# Patient Record
Sex: MALE | Race: WHITE | NOT HISPANIC OR LATINO | Employment: OTHER | RURAL
[De-identification: names, ages, dates, MRNs, and addresses within clinical notes are randomized per-mention and may not be internally consistent; named-entity substitution may affect disease eponyms.]

---

## 2020-10-22 ENCOUNTER — HISTORICAL (OUTPATIENT)
Dept: ADMINISTRATIVE | Facility: HOSPITAL | Age: 60
End: 2020-10-22

## 2021-06-28 ENCOUNTER — OFFICE VISIT (OUTPATIENT)
Dept: FAMILY MEDICINE | Facility: CLINIC | Age: 61
End: 2021-06-28
Payer: MEDICAID

## 2021-06-28 VITALS
BODY MASS INDEX: 14.37 KG/M2 | RESPIRATION RATE: 18 BRPM | WEIGHT: 97 LBS | OXYGEN SATURATION: 95 % | DIASTOLIC BLOOD PRESSURE: 90 MMHG | HEART RATE: 72 BPM | SYSTOLIC BLOOD PRESSURE: 142 MMHG | HEIGHT: 69 IN

## 2021-06-28 DIAGNOSIS — F32.A DEPRESSION, UNSPECIFIED DEPRESSION TYPE: Primary | ICD-10-CM

## 2021-06-28 DIAGNOSIS — J44.9 CHRONIC OBSTRUCTIVE PULMONARY DISEASE, UNSPECIFIED COPD TYPE: ICD-10-CM

## 2021-06-28 PROCEDURE — 99212 PR OFFICE/OUTPT VISIT, EST, LEVL II, 10-19 MIN: ICD-10-PCS | Mod: ,,, | Performed by: FAMILY MEDICINE

## 2021-06-28 PROCEDURE — 99212 OFFICE O/P EST SF 10 MIN: CPT | Mod: ,,, | Performed by: FAMILY MEDICINE

## 2021-06-28 RX ORDER — ALBUTEROL SULFATE 90 UG/1
2 AEROSOL, METERED RESPIRATORY (INHALATION) EVERY 6 HOURS PRN
COMMUNITY
End: 2021-12-14 | Stop reason: SDUPTHER

## 2021-12-14 RX ORDER — ALBUTEROL SULFATE 90 UG/1
2 AEROSOL, METERED RESPIRATORY (INHALATION) EVERY 6 HOURS PRN
Qty: 18 G | Refills: 0 | Status: SHIPPED | OUTPATIENT
Start: 2021-12-14 | End: 2022-01-03

## 2021-12-28 ENCOUNTER — OFFICE VISIT (OUTPATIENT)
Dept: FAMILY MEDICINE | Facility: CLINIC | Age: 61
End: 2021-12-28
Payer: MEDICAID

## 2021-12-28 VITALS
OXYGEN SATURATION: 98 % | HEART RATE: 66 BPM | HEIGHT: 69 IN | TEMPERATURE: 98 F | RESPIRATION RATE: 18 BRPM | DIASTOLIC BLOOD PRESSURE: 69 MMHG | BODY MASS INDEX: 14.37 KG/M2 | SYSTOLIC BLOOD PRESSURE: 142 MMHG | WEIGHT: 97 LBS

## 2021-12-28 DIAGNOSIS — F32.A DEPRESSION, UNSPECIFIED DEPRESSION TYPE: Primary | ICD-10-CM

## 2021-12-28 DIAGNOSIS — J44.9 CHRONIC OBSTRUCTIVE PULMONARY DISEASE, UNSPECIFIED COPD TYPE: ICD-10-CM

## 2021-12-28 DIAGNOSIS — H54.3 DECREASED VISION IN BOTH EYES: ICD-10-CM

## 2021-12-28 PROCEDURE — 3008F PR BODY MASS INDEX (BMI) DOCUMENTED: ICD-10-PCS | Mod: CPTII,,, | Performed by: FAMILY MEDICINE

## 2021-12-28 PROCEDURE — 3077F PR MOST RECENT SYSTOLIC BLOOD PRESSURE >= 140 MM HG: ICD-10-PCS | Mod: CPTII,,, | Performed by: FAMILY MEDICINE

## 2021-12-28 PROCEDURE — 99213 OFFICE O/P EST LOW 20 MIN: CPT | Mod: ,,, | Performed by: FAMILY MEDICINE

## 2021-12-28 PROCEDURE — 3078F DIAST BP <80 MM HG: CPT | Mod: CPTII,,, | Performed by: FAMILY MEDICINE

## 2021-12-28 PROCEDURE — 3008F BODY MASS INDEX DOCD: CPT | Mod: CPTII,,, | Performed by: FAMILY MEDICINE

## 2021-12-28 PROCEDURE — 1159F PR MEDICATION LIST DOCUMENTED IN MEDICAL RECORD: ICD-10-PCS | Mod: CPTII,,, | Performed by: FAMILY MEDICINE

## 2021-12-28 PROCEDURE — 99213 PR OFFICE/OUTPT VISIT, EST, LEVL III, 20-29 MIN: ICD-10-PCS | Mod: ,,, | Performed by: FAMILY MEDICINE

## 2021-12-28 PROCEDURE — 3078F PR MOST RECENT DIASTOLIC BLOOD PRESSURE < 80 MM HG: ICD-10-PCS | Mod: CPTII,,, | Performed by: FAMILY MEDICINE

## 2021-12-28 PROCEDURE — 3077F SYST BP >= 140 MM HG: CPT | Mod: CPTII,,, | Performed by: FAMILY MEDICINE

## 2021-12-28 PROCEDURE — 1159F MED LIST DOCD IN RCRD: CPT | Mod: CPTII,,, | Performed by: FAMILY MEDICINE

## 2021-12-28 RX ORDER — DULOXETIN HYDROCHLORIDE 60 MG/1
60 CAPSULE, DELAYED RELEASE ORAL DAILY
Qty: 90 CAPSULE | Refills: 1 | Status: SHIPPED | OUTPATIENT
Start: 2021-12-28 | End: 2022-06-27 | Stop reason: SDUPTHER

## 2022-01-19 ENCOUNTER — IMMUNIZATION (OUTPATIENT)
Dept: FAMILY MEDICINE | Facility: CLINIC | Age: 62
End: 2022-01-19
Payer: MEDICAID

## 2022-01-19 DIAGNOSIS — Z23 NEED FOR VACCINATION: Primary | ICD-10-CM

## 2022-01-19 PROCEDURE — 0013A COVID-19, MRNA, LNP-S, PF, 100 MCG/0.5 ML DOSE VACCINE: ICD-10-PCS | Mod: ,,, | Performed by: NURSE PRACTITIONER

## 2022-01-19 PROCEDURE — 91301 COVID-19, MRNA, LNP-S, PF, 100 MCG/0.5 ML DOSE VACCINE: ICD-10-PCS | Mod: ,,, | Performed by: NURSE PRACTITIONER

## 2022-01-19 PROCEDURE — 91301 COVID-19, MRNA, LNP-S, PF, 100 MCG/0.5 ML DOSE VACCINE: CPT | Mod: ,,, | Performed by: NURSE PRACTITIONER

## 2022-01-19 PROCEDURE — 0013A COVID-19, MRNA, LNP-S, PF, 100 MCG/0.5 ML DOSE VACCINE: CPT | Mod: ,,, | Performed by: NURSE PRACTITIONER

## 2022-06-28 ENCOUNTER — APPOINTMENT (OUTPATIENT)
Dept: RADIOLOGY | Facility: CLINIC | Age: 62
End: 2022-06-28
Attending: FAMILY MEDICINE
Payer: MEDICAID

## 2022-06-28 ENCOUNTER — OFFICE VISIT (OUTPATIENT)
Dept: FAMILY MEDICINE | Facility: CLINIC | Age: 62
End: 2022-06-28
Payer: MEDICAID

## 2022-06-28 VITALS
DIASTOLIC BLOOD PRESSURE: 98 MMHG | HEART RATE: 92 BPM | BODY MASS INDEX: 12.74 KG/M2 | HEIGHT: 69 IN | SYSTOLIC BLOOD PRESSURE: 157 MMHG | WEIGHT: 86 LBS | OXYGEN SATURATION: 98 % | TEMPERATURE: 97 F | RESPIRATION RATE: 18 BRPM

## 2022-06-28 DIAGNOSIS — R94.31 ABNORMAL ECG: ICD-10-CM

## 2022-06-28 DIAGNOSIS — F17.200 SMOKER: ICD-10-CM

## 2022-06-28 DIAGNOSIS — R06.02 SOB (SHORTNESS OF BREATH): Primary | ICD-10-CM

## 2022-06-28 DIAGNOSIS — R63.4 WEIGHT LOSS: ICD-10-CM

## 2022-06-28 PROCEDURE — 84443 ASSAY THYROID STIM HORMONE: CPT | Mod: ,,, | Performed by: CLINICAL MEDICAL LABORATORY

## 2022-06-28 PROCEDURE — 85025 COMPLETE CBC W/AUTO DIFF WBC: CPT | Mod: ,,, | Performed by: CLINICAL MEDICAL LABORATORY

## 2022-06-28 PROCEDURE — 3077F PR MOST RECENT SYSTOLIC BLOOD PRESSURE >= 140 MM HG: ICD-10-PCS | Mod: CPTII,,, | Performed by: FAMILY MEDICINE

## 2022-06-28 PROCEDURE — 93005 PR ELECTROCARDIOGRAM, TRACING: ICD-10-PCS | Mod: ,,, | Performed by: FAMILY MEDICINE

## 2022-06-28 PROCEDURE — 99213 PR OFFICE/OUTPT VISIT, EST, LEVL III, 20-29 MIN: ICD-10-PCS | Mod: ,,, | Performed by: FAMILY MEDICINE

## 2022-06-28 PROCEDURE — 3008F BODY MASS INDEX DOCD: CPT | Mod: CPTII,,, | Performed by: FAMILY MEDICINE

## 2022-06-28 PROCEDURE — 71046 X-RAY EXAM CHEST 2 VIEWS: CPT | Mod: TC,RHCUB | Performed by: FAMILY MEDICINE

## 2022-06-28 PROCEDURE — 1159F PR MEDICATION LIST DOCUMENTED IN MEDICAL RECORD: ICD-10-PCS | Mod: CPTII,,, | Performed by: FAMILY MEDICINE

## 2022-06-28 PROCEDURE — 80053 COMPREHENSIVE METABOLIC PANEL: ICD-10-PCS | Mod: ,,, | Performed by: CLINICAL MEDICAL LABORATORY

## 2022-06-28 PROCEDURE — 71046 XR CHEST PA AND LATERAL: ICD-10-PCS | Mod: 26,,, | Performed by: RADIOLOGY

## 2022-06-28 PROCEDURE — 71046 X-RAY EXAM CHEST 2 VIEWS: CPT | Mod: 26,,, | Performed by: RADIOLOGY

## 2022-06-28 PROCEDURE — 1159F MED LIST DOCD IN RCRD: CPT | Mod: CPTII,,, | Performed by: FAMILY MEDICINE

## 2022-06-28 PROCEDURE — 3077F SYST BP >= 140 MM HG: CPT | Mod: CPTII,,, | Performed by: FAMILY MEDICINE

## 2022-06-28 PROCEDURE — 3008F PR BODY MASS INDEX (BMI) DOCUMENTED: ICD-10-PCS | Mod: CPTII,,, | Performed by: FAMILY MEDICINE

## 2022-06-28 PROCEDURE — 3080F DIAST BP >= 90 MM HG: CPT | Mod: CPTII,,, | Performed by: FAMILY MEDICINE

## 2022-06-28 PROCEDURE — 84443 TSH: ICD-10-PCS | Mod: ,,, | Performed by: CLINICAL MEDICAL LABORATORY

## 2022-06-28 PROCEDURE — 99213 OFFICE O/P EST LOW 20 MIN: CPT | Mod: ,,, | Performed by: FAMILY MEDICINE

## 2022-06-28 PROCEDURE — 80053 COMPREHEN METABOLIC PANEL: CPT | Mod: ,,, | Performed by: CLINICAL MEDICAL LABORATORY

## 2022-06-28 PROCEDURE — 93005 ELECTROCARDIOGRAM TRACING: CPT | Mod: ,,, | Performed by: FAMILY MEDICINE

## 2022-06-28 PROCEDURE — 3080F PR MOST RECENT DIASTOLIC BLOOD PRESSURE >= 90 MM HG: ICD-10-PCS | Mod: CPTII,,, | Performed by: FAMILY MEDICINE

## 2022-06-28 PROCEDURE — 1160F PR REVIEW ALL MEDS BY PRESCRIBER/CLIN PHARMACIST DOCUMENTED: ICD-10-PCS | Mod: CPTII,,, | Performed by: FAMILY MEDICINE

## 2022-06-28 PROCEDURE — 85025 CBC WITH DIFFERENTIAL: ICD-10-PCS | Mod: ,,, | Performed by: CLINICAL MEDICAL LABORATORY

## 2022-06-28 PROCEDURE — 1160F RVW MEDS BY RX/DR IN RCRD: CPT | Mod: CPTII,,, | Performed by: FAMILY MEDICINE

## 2022-06-28 NOTE — PROGRESS NOTES
Dereck Delgado is a 62 y.o. male seen today for symptoms of increased fatigue increased shortness of breath erectile dysfunction and abnormal weight loss.  Patient reports his appetite had been worsening but over the last 2 days has improved.  Patient continues to smoke approximately 1 pack per day and has a greater than 50 year pack history.  We discussed a low-dose CT scan of the chest and I reminded the patient of the risks and benefits of this procedure.  This includes radiation.  Patient has had increased symptoms of erectile dysfunction and before we prescribed medication I recommended a cardiac evaluation.  His EKG here in the office today was abnormal and Cardiology consult has been initiated.      No past medical history on file.  No family history on file.  Current Outpatient Medications on File Prior to Visit   Medication Sig Dispense Refill    DULoxetine (CYMBALTA) 60 MG capsule TAKE 1 CAPSULE BY MOUTH EVERY DAY 90 capsule 1    fluticasone propionate (FLONASE) 50 mcg/actuation nasal spray 2 SPRAYS IN EACH NOSTRIL DAILY 16 mL 5    glycopyrrolate-formoteroL (BEVESPI AEROSPHERE) 9-4.8 mcg HFAA Inhale 2 puffs into the lungs 2 (two) times daily. Controller      hydroCHLOROthiazide (HYDRODIURIL) 12.5 MG Tab TAKE 1 TABLET BY MOUTH EVERY DAY IN THE MORNING 30 tablet 5    VENTOLIN HFA 90 mcg/actuation inhaler INHALE 2 PUFFS INTO THE LUNGS EVERY 6 (SIX) HOURS AS NEEDED FOR WHEEZING. RESCUE 18 g 3     No current facility-administered medications on file prior to visit.     Immunization History   Administered Date(s) Administered    COVID-19, MRNA, LN-S, PF (MODERNA FULL 0.5 ML DOSE) 03/09/2021, 04/06/2021, 01/19/2022       Review of Systems   Constitutional: Positive for malaise/fatigue and weight loss. Negative for fever.   HENT: Positive for congestion.    Respiratory: Positive for cough and shortness of breath.    Cardiovascular: Negative for chest pain and palpitations.   Gastrointestinal: Negative for  nausea and vomiting.   Psychiatric/Behavioral: Negative for depression.        Vitals:    06/28/22 1405   BP: (!) 157/98   Pulse:    Resp:    Temp:        Physical Exam  Vitals reviewed.   Constitutional:       Appearance: Normal appearance.      Comments: Patient is thin in appearance with prominent ribs and clavicles noted.   HENT:      Head: Normocephalic.   Eyes:      Extraocular Movements: Extraocular movements intact.      Conjunctiva/sclera: Conjunctivae normal.      Pupils: Pupils are equal, round, and reactive to light.   Neck:      Thyroid: No thyroid mass or thyromegaly.   Cardiovascular:      Rate and Rhythm: Normal rate and regular rhythm.      Heart sounds: Normal heart sounds. No murmur heard.    No gallop.   Pulmonary:      Effort: Pulmonary effort is normal. No respiratory distress.      Breath sounds: Decreased air movement present. Decreased breath sounds present. No wheezing or rales.   Skin:     General: Skin is warm and dry.      Coloration: Skin is not jaundiced or pale.   Neurological:      Mental Status: He is alert.   Psychiatric:         Mood and Affect: Mood normal.         Behavior: Behavior normal.         Thought Content: Thought content normal.         Judgment: Judgment normal.          Assessment and Plan  SOB (shortness of breath)  -     X-Ray Chest PA And Lateral; Future; Expected date: 06/28/2022  -     POCT EKG 12-LEAD (NOT FOR [facility] USE)  -     Ambulatory referral/consult to Cardiology; Future; Expected date: 07/05/2022    Weight loss  -     CBC Auto Differential; Future; Expected date: 06/28/2022  -     Comprehensive Metabolic Panel; Future; Expected date: 06/28/2022  -     TSH; Future; Expected date: 06/28/2022  -     X-Ray Chest PA And Lateral; Future; Expected date: 06/28/2022    Smoker  -     CT Chest Lung Screening Low Dose; Future; Expected date: 06/28/2022    Abnormal ECG  -     Ambulatory referral/consult to Cardiology; Future; Expected date:  07/05/2022            Return to clinic in 1 month for follow-up or as needed.  Due to his symptoms of increased shortness of breath we have ordered he an assistant breathing device and oxygen at night.  He is strongly encouraged to discontinue smoking.  His chest x-ray is currently pending but suggest severe COPD.    Health Maintenance Topics with due status: Not Due       Topic Last Completion Date    Influenza Vaccine Not Due

## 2022-06-29 ENCOUNTER — TELEPHONE (OUTPATIENT)
Dept: FAMILY MEDICINE | Facility: CLINIC | Age: 62
End: 2022-06-29
Payer: MEDICAID

## 2022-06-29 LAB
ALBUMIN SERPL BCP-MCNC: 3.7 G/DL (ref 3.5–5)
ALBUMIN/GLOB SERPL: 0.9 {RATIO}
ALP SERPL-CCNC: 107 U/L (ref 45–115)
ALT SERPL W P-5'-P-CCNC: 25 U/L (ref 16–61)
ANION GAP SERPL CALCULATED.3IONS-SCNC: 17 MMOL/L (ref 7–16)
AST SERPL W P-5'-P-CCNC: 33 U/L (ref 15–37)
BASOPHILS # BLD AUTO: 0.07 K/UL (ref 0–0.2)
BASOPHILS NFR BLD AUTO: 0.7 % (ref 0–1)
BILIRUB SERPL-MCNC: 1.6 MG/DL (ref 0–1.2)
BUN SERPL-MCNC: 9 MG/DL (ref 7–18)
BUN/CREAT SERPL: 11 (ref 6–20)
CALCIUM SERPL-MCNC: 9.7 MG/DL (ref 8.5–10.1)
CHLORIDE SERPL-SCNC: 93 MMOL/L (ref 98–107)
CO2 SERPL-SCNC: 29 MMOL/L (ref 21–32)
CREAT SERPL-MCNC: 0.8 MG/DL (ref 0.7–1.3)
DIFFERENTIAL METHOD BLD: ABNORMAL
EOSINOPHIL # BLD AUTO: 0.11 K/UL (ref 0–0.5)
EOSINOPHIL NFR BLD AUTO: 1.1 % (ref 1–4)
ERYTHROCYTE [DISTWIDTH] IN BLOOD BY AUTOMATED COUNT: 18.9 % (ref 11.5–14.5)
GLOBULIN SER-MCNC: 4 G/DL (ref 2–4)
GLUCOSE SERPL-MCNC: 91 MG/DL (ref 74–106)
HCT VFR BLD AUTO: 59.5 % (ref 40–54)
HGB BLD-MCNC: 21 G/DL (ref 13.5–18)
IMM GRANULOCYTES # BLD AUTO: 0.02 K/UL (ref 0–0.04)
IMM GRANULOCYTES NFR BLD: 0.2 % (ref 0–0.4)
LYMPHOCYTES # BLD AUTO: 1.68 K/UL (ref 1–4.8)
LYMPHOCYTES NFR BLD AUTO: 17.1 % (ref 27–41)
MCH RBC QN AUTO: 34.1 PG (ref 27–31)
MCHC RBC AUTO-ENTMCNC: 35.3 G/DL (ref 32–36)
MCV RBC AUTO: 96.6 FL (ref 80–96)
MONOCYTES # BLD AUTO: 0.96 K/UL (ref 0–0.8)
MONOCYTES NFR BLD AUTO: 9.8 % (ref 2–6)
MPC BLD CALC-MCNC: 12 FL (ref 9.4–12.4)
NEUTROPHILS # BLD AUTO: 6.99 K/UL (ref 1.8–7.7)
NEUTROPHILS NFR BLD AUTO: 71.1 % (ref 53–65)
NRBC # BLD AUTO: 0 X10E3/UL
NRBC, AUTO (.00): 0 %
PLATELET # BLD AUTO: 220 K/UL (ref 150–400)
POTASSIUM SERPL-SCNC: 3.9 MMOL/L (ref 3.5–5.1)
PROT SERPL-MCNC: 7.7 G/DL (ref 6.4–8.2)
RBC # BLD AUTO: 6.16 M/UL (ref 4.6–6.2)
SODIUM SERPL-SCNC: 135 MMOL/L (ref 136–145)
TSH SERPL DL<=0.005 MIU/L-ACNC: 1.27 UIU/ML (ref 0.36–3.74)
WBC # BLD AUTO: 9.83 K/UL (ref 4.5–11)

## 2022-06-29 NOTE — TELEPHONE ENCOUNTER
----- Message from Jose Manuel Castro MD sent at 6/29/2022  7:55 AM CDT -----  Patient's lungs are hyperexpanded consistent with COPD but no other findings.

## 2022-07-07 ENCOUNTER — TELEPHONE (OUTPATIENT)
Dept: FAMILY MEDICINE | Facility: CLINIC | Age: 62
End: 2022-07-07
Payer: MEDICAID

## 2022-07-07 NOTE — TELEPHONE ENCOUNTER
----- Message from Jose Manuel Castro MD sent at 6/30/2022  7:55 AM CDT -----  Please add a direct bilirubin, and with his profound weight loss I recommend a CT of the abdomen and pelvis with and without contrast.  Patient's CBC is markedly abnormal with severely thickened blood.  This is likely secondary to a compensation mechanism for his COPD.  I recommend a frequent use of his assistant ventilatory device and the patient does need evaluation for portable O2.  Due to his risk for occlusion leading to CVA are cardiac events I recommended  and a sap Hematology evaluation.

## 2022-07-11 ENCOUNTER — OFFICE VISIT (OUTPATIENT)
Dept: FAMILY MEDICINE | Facility: CLINIC | Age: 62
End: 2022-07-11
Payer: MEDICAID

## 2022-07-11 VITALS
RESPIRATION RATE: 18 BRPM | WEIGHT: 87.81 LBS | SYSTOLIC BLOOD PRESSURE: 140 MMHG | HEART RATE: 94 BPM | OXYGEN SATURATION: 95 % | BODY MASS INDEX: 13.01 KG/M2 | HEIGHT: 69 IN | DIASTOLIC BLOOD PRESSURE: 90 MMHG

## 2022-07-11 DIAGNOSIS — R79.89 ABNORMAL CBC: ICD-10-CM

## 2022-07-11 DIAGNOSIS — Z86.73 HISTORY OF CVA (CEREBROVASCULAR ACCIDENT): ICD-10-CM

## 2022-07-11 DIAGNOSIS — R17 ELEVATED BILIRUBIN: Primary | ICD-10-CM

## 2022-07-11 DIAGNOSIS — R63.4 WEIGHT LOSS: ICD-10-CM

## 2022-07-11 LAB — BILIRUB DIRECT SERPL-MCNC: 0.3 MG/DL (ref 0–0.2)

## 2022-07-11 PROCEDURE — 1159F MED LIST DOCD IN RCRD: CPT | Mod: CPTII,,, | Performed by: FAMILY MEDICINE

## 2022-07-11 PROCEDURE — 1160F RVW MEDS BY RX/DR IN RCRD: CPT | Mod: CPTII,,, | Performed by: FAMILY MEDICINE

## 2022-07-11 PROCEDURE — 82248 BILIRUBIN, DIRECT: ICD-10-PCS | Mod: ,,, | Performed by: CLINICAL MEDICAL LABORATORY

## 2022-07-11 PROCEDURE — 3077F SYST BP >= 140 MM HG: CPT | Mod: CPTII,,, | Performed by: FAMILY MEDICINE

## 2022-07-11 PROCEDURE — 1160F PR REVIEW ALL MEDS BY PRESCRIBER/CLIN PHARMACIST DOCUMENTED: ICD-10-PCS | Mod: CPTII,,, | Performed by: FAMILY MEDICINE

## 2022-07-11 PROCEDURE — 99213 PR OFFICE/OUTPT VISIT, EST, LEVL III, 20-29 MIN: ICD-10-PCS | Mod: ,,, | Performed by: FAMILY MEDICINE

## 2022-07-11 PROCEDURE — 99213 OFFICE O/P EST LOW 20 MIN: CPT | Mod: ,,, | Performed by: FAMILY MEDICINE

## 2022-07-11 PROCEDURE — 3008F PR BODY MASS INDEX (BMI) DOCUMENTED: ICD-10-PCS | Mod: CPTII,,, | Performed by: FAMILY MEDICINE

## 2022-07-11 PROCEDURE — 82248 BILIRUBIN DIRECT: CPT | Mod: ,,, | Performed by: CLINICAL MEDICAL LABORATORY

## 2022-07-11 PROCEDURE — 3080F DIAST BP >= 90 MM HG: CPT | Mod: CPTII,,, | Performed by: FAMILY MEDICINE

## 2022-07-11 PROCEDURE — 3077F PR MOST RECENT SYSTOLIC BLOOD PRESSURE >= 140 MM HG: ICD-10-PCS | Mod: CPTII,,, | Performed by: FAMILY MEDICINE

## 2022-07-11 PROCEDURE — 3008F BODY MASS INDEX DOCD: CPT | Mod: CPTII,,, | Performed by: FAMILY MEDICINE

## 2022-07-11 PROCEDURE — 3080F PR MOST RECENT DIASTOLIC BLOOD PRESSURE >= 90 MM HG: ICD-10-PCS | Mod: CPTII,,, | Performed by: FAMILY MEDICINE

## 2022-07-11 PROCEDURE — 1159F PR MEDICATION LIST DOCUMENTED IN MEDICAL RECORD: ICD-10-PCS | Mod: CPTII,,, | Performed by: FAMILY MEDICINE

## 2022-07-11 NOTE — PROGRESS NOTES
Dereck Delgado is a 62 y.o. male seen today for follow-up for recent lab work.  Patient does have severe polycythemia likely secondary to his COPD and chronic hypoxia.  Patient also has an elevated bilirubin which may be secondary to his polycythemia and myolysis.  With this profound weight loss I have recommended a CT of the abdomen to make sure there is not liver pathology present.  A direct bilirubin was also ordered today.  Patient reports he is planning to cut back on his smoking and is already down to 18 cigarettes a day.  His cardiology evaluation for an abnormal EKG is currently pending as well as his low-dose CT scan of the chest.  Patient defers COVID vaccination.    Past Medical History:   Diagnosis Date    COPD (chronic obstructive pulmonary disease)     CVA (cerebral vascular accident)     Depression      Family History   Problem Relation Age of Onset    Cancer Mother     Cancer Maternal Aunt     Heart disease Maternal Uncle     Birth defects Maternal Grandmother      Current Outpatient Medications on File Prior to Visit   Medication Sig Dispense Refill    DULoxetine (CYMBALTA) 60 MG capsule TAKE 1 CAPSULE BY MOUTH EVERY DAY 90 capsule 1    fluticasone propionate (FLONASE) 50 mcg/actuation nasal spray 2 SPRAYS IN EACH NOSTRIL DAILY 16 mL 5    hydroCHLOROthiazide (HYDRODIURIL) 12.5 MG Tab TAKE 1 TABLET BY MOUTH EVERY DAY IN THE MORNING 30 tablet 5    VENTOLIN HFA 90 mcg/actuation inhaler INHALE 2 PUFFS INTO THE LUNGS EVERY 6 (SIX) HOURS AS NEEDED FOR WHEEZING. RESCUE 18 g 3    [DISCONTINUED] glycopyrrolate-formoteroL (BEVESPI AEROSPHERE) 9-4.8 mcg HFAA Inhale 2 puffs into the lungs 2 (two) times daily. Controller       No current facility-administered medications on file prior to visit.     Immunization History   Administered Date(s) Administered    COVID-19, MRNA, LN-S, PF (MODERNA FULL 0.5 ML DOSE) 03/09/2021, 04/06/2021, 01/19/2022       Review of Systems   Constitutional: Positive for  malaise/fatigue and weight loss. Negative for fever.   Respiratory: Positive for cough and shortness of breath.    Cardiovascular: Negative for chest pain and palpitations.   Gastrointestinal: Negative for nausea and vomiting.   Psychiatric/Behavioral: Negative for depression.        Vitals:    07/11/22 1448   BP: (!) 140/90   Pulse:    Resp:        Physical Exam  Vitals reviewed.   Constitutional:       Appearance: Normal appearance.   HENT:      Head: Normocephalic.   Eyes:      Extraocular Movements: Extraocular movements intact.      Conjunctiva/sclera: Conjunctivae normal.      Pupils: Pupils are equal, round, and reactive to light.   Neck:      Thyroid: No thyroid mass or thyromegaly.   Cardiovascular:      Rate and Rhythm: Normal rate and regular rhythm.      Heart sounds: Normal heart sounds. No murmur heard.    No gallop.   Pulmonary:      Effort: Pulmonary effort is normal. No respiratory distress.      Breath sounds: Decreased air movement present. Examination of the right-upper field reveals rhonchi. Decreased breath sounds and rhonchi present. No wheezing or rales.   Abdominal:      Palpations: Abdomen is soft. There is no hepatomegaly, splenomegaly, mass or pulsatile mass.   Skin:     General: Skin is warm and dry.      Coloration: Skin is not jaundiced or pale.   Neurological:      Mental Status: He is alert.   Psychiatric:         Mood and Affect: Mood normal.         Behavior: Behavior normal.         Thought Content: Thought content normal.         Judgment: Judgment normal.          Assessment and Plan  Elevated bilirubin  -     Bilirubin, Direct; Future; Expected date: 07/11/2022    Weight loss  -     CT Abdomen Pelvis W Wo Contrast; Future; Expected date: 07/11/2022    History of CVA (cerebrovascular accident)  -     Ambulatory referral/consult to Hematology / Oncology; Future; Expected date: 07/18/2022    Abnormal CBC  -     Ambulatory referral/consult to Hematology / Oncology; Future; Expected  date: 07/18/2022    Other orders  -     glycopyrrolate-formoteroL (BEVESPI AEROSPHERE) 9-4.8 mcg HFAA; Inhale 2 puffs into the lungs 2 (two) times daily. Controller  Dispense: 10.7 g; Refill: 5            Return to clinic in 1 month for follow-up or as needed.  Patient did not cough for for portable O2 at this time.    Health Maintenance Topics with due status: Not Due       Topic Last Completion Date    Influenza Vaccine Not Due

## 2022-07-13 ENCOUNTER — HOSPITAL ENCOUNTER (OUTPATIENT)
Dept: RADIOLOGY | Facility: HOSPITAL | Age: 62
Discharge: HOME | End: 2022-07-13
Attending: FAMILY MEDICINE
Payer: MEDICAID

## 2022-07-13 DIAGNOSIS — R63.4 WEIGHT LOSS: ICD-10-CM

## 2022-07-13 DIAGNOSIS — F17.200 SMOKER: ICD-10-CM

## 2022-07-13 PROCEDURE — 71271 CT THORAX LUNG CANCER SCR C-: CPT | Mod: TC

## 2022-07-13 PROCEDURE — 25500020 PHARM REV CODE 255: Performed by: FAMILY MEDICINE

## 2022-07-13 PROCEDURE — 71271 CT THORAX LUNG CANCER SCR C-: CPT | Mod: 26,,, | Performed by: RADIOLOGY

## 2022-07-13 PROCEDURE — 74178 CT ABDOMEN PELVIS W WO CONTRAST: ICD-10-PCS | Mod: 26,,, | Performed by: RADIOLOGY

## 2022-07-13 PROCEDURE — 74178 CT ABD&PLV WO CNTR FLWD CNTR: CPT | Mod: 26,,, | Performed by: RADIOLOGY

## 2022-07-13 PROCEDURE — 74178 CT ABD&PLV WO CNTR FLWD CNTR: CPT | Mod: TC

## 2022-07-13 PROCEDURE — 71271 CT CHEST LUNG SCREENING LOW DOSE: ICD-10-PCS | Mod: 26,,, | Performed by: RADIOLOGY

## 2022-07-13 RX ADMIN — IOPAMIDOL 100 ML: 755 INJECTION, SOLUTION INTRAVENOUS at 10:07

## 2022-07-21 ENCOUNTER — OFFICE VISIT (OUTPATIENT)
Dept: CARDIOLOGY | Facility: CLINIC | Age: 62
End: 2022-07-21
Payer: MEDICAID

## 2022-07-21 VITALS
WEIGHT: 89 LBS | SYSTOLIC BLOOD PRESSURE: 144 MMHG | HEIGHT: 69 IN | HEART RATE: 73 BPM | OXYGEN SATURATION: 96 % | BODY MASS INDEX: 13.18 KG/M2 | DIASTOLIC BLOOD PRESSURE: 96 MMHG

## 2022-07-21 DIAGNOSIS — R00.2 PALPITATIONS: Primary | ICD-10-CM

## 2022-07-21 DIAGNOSIS — R06.02 SOB (SHORTNESS OF BREATH): ICD-10-CM

## 2022-07-21 DIAGNOSIS — R42 DIZZINESS: ICD-10-CM

## 2022-07-21 DIAGNOSIS — R94.31 ABNORMAL ECG: ICD-10-CM

## 2022-07-21 DIAGNOSIS — I10 ESSENTIAL HYPERTENSION: ICD-10-CM

## 2022-07-21 PROCEDURE — 1160F PR REVIEW ALL MEDS BY PRESCRIBER/CLIN PHARMACIST DOCUMENTED: ICD-10-PCS | Mod: CPTII,,, | Performed by: INTERNAL MEDICINE

## 2022-07-21 PROCEDURE — 93005 ELECTROCARDIOGRAM TRACING: CPT | Mod: PBBFAC | Performed by: INTERNAL MEDICINE

## 2022-07-21 PROCEDURE — 3008F BODY MASS INDEX DOCD: CPT | Mod: CPTII,,, | Performed by: INTERNAL MEDICINE

## 2022-07-21 PROCEDURE — 93010 EKG 12-LEAD: ICD-10-PCS | Mod: S$PBB,,, | Performed by: INTERNAL MEDICINE

## 2022-07-21 PROCEDURE — 3080F DIAST BP >= 90 MM HG: CPT | Mod: CPTII,,, | Performed by: INTERNAL MEDICINE

## 2022-07-21 PROCEDURE — 3008F PR BODY MASS INDEX (BMI) DOCUMENTED: ICD-10-PCS | Mod: CPTII,,, | Performed by: INTERNAL MEDICINE

## 2022-07-21 PROCEDURE — 3080F PR MOST RECENT DIASTOLIC BLOOD PRESSURE >= 90 MM HG: ICD-10-PCS | Mod: CPTII,,, | Performed by: INTERNAL MEDICINE

## 2022-07-21 PROCEDURE — 1159F PR MEDICATION LIST DOCUMENTED IN MEDICAL RECORD: ICD-10-PCS | Mod: CPTII,,, | Performed by: INTERNAL MEDICINE

## 2022-07-21 PROCEDURE — 1159F MED LIST DOCD IN RCRD: CPT | Mod: CPTII,,, | Performed by: INTERNAL MEDICINE

## 2022-07-21 PROCEDURE — 99205 OFFICE O/P NEW HI 60 MIN: CPT | Mod: S$PBB,,, | Performed by: INTERNAL MEDICINE

## 2022-07-21 PROCEDURE — 93010 ELECTROCARDIOGRAM REPORT: CPT | Mod: S$PBB,,, | Performed by: INTERNAL MEDICINE

## 2022-07-21 PROCEDURE — 99205 PR OFFICE/OUTPT VISIT, NEW, LEVL V, 60-74 MIN: ICD-10-PCS | Mod: S$PBB,,, | Performed by: INTERNAL MEDICINE

## 2022-07-21 PROCEDURE — 3077F SYST BP >= 140 MM HG: CPT | Mod: CPTII,,, | Performed by: INTERNAL MEDICINE

## 2022-07-21 PROCEDURE — 3077F PR MOST RECENT SYSTOLIC BLOOD PRESSURE >= 140 MM HG: ICD-10-PCS | Mod: CPTII,,, | Performed by: INTERNAL MEDICINE

## 2022-07-21 PROCEDURE — 99214 OFFICE O/P EST MOD 30 MIN: CPT | Mod: PBBFAC | Performed by: INTERNAL MEDICINE

## 2022-07-21 PROCEDURE — 1160F RVW MEDS BY RX/DR IN RCRD: CPT | Mod: CPTII,,, | Performed by: INTERNAL MEDICINE

## 2022-07-21 NOTE — PATIENT INSTRUCTIONS
Abnormal EKG with chest pain - Lexiscan.  Start aspirin 81 mg daily   2.  Shortness of breath - echo

## 2022-07-21 NOTE — PROGRESS NOTES
"Cardiology Clinic Note:    PCP: Jose Manuel Castro MD    REFERRING PHYSICIAN: Jose Manuel Castro MD    CHIEF COMPLAINT:  Shortness of breath      HISTORY OF PRESENT ILLNESS:  Dereck Delgado is a 62 y.o. male who presents for evaluation of shortness of breath     Pt states he has occasional chest pain at rest and exertion, rate 3/10, occurs "every now and then", comes and goes spontaneously, lasts three to five minutes.  He is active with yard work and has shortness of breath, uses inhaler.  Has decreased exercise tolerance due to shortness of breath, avoiding activity he feels will cause SOB. .   Pt has C-pap uses intermittently, for at most two hours a night, but he states it ash him.  Had previous CVA 6/2018 with decreased vision, right eye, has lens implant.     Smoker, has decreased to 10-20 cigarettes daily.  Pt states nerve are bad.     Review of Systems   Constitutional: Positive for malaise/fatigue. Negative for diaphoresis, night sweats and weight gain.   HENT: Negative for congestion, ear pain, hearing loss, nosebleeds and sore throat.    Eyes: Negative for blurred vision, double vision, pain, photophobia and visual disturbance.   Cardiovascular: Positive for chest pain. Negative for claudication, dyspnea on exertion, irregular heartbeat, leg swelling, near-syncope, orthopnea, palpitations and syncope.   Respiratory: Positive for shortness of breath and wheezing. Negative for cough, sleep disturbances due to breathing and snoring.    Endocrine: Negative for cold intolerance, heat intolerance, polydipsia, polyphagia and polyuria.   Hematologic/Lymphatic: Negative for bleeding problem. Does not bruise/bleed easily.   Skin: Positive for rash. Negative for dry skin, flushing, itching and skin cancer.   Musculoskeletal: Negative for arthritis, back pain, falls, joint pain, muscle cramps, muscle weakness and myalgias.   Gastrointestinal: Negative for abdominal pain, change in bowel habit, " constipation, diarrhea, dysphagia, heartburn, nausea and vomiting.   Genitourinary: Negative for bladder incontinence, dysuria, flank pain, frequency and nocturia.   Neurological: Negative for dizziness, focal weakness, headaches, light-headedness, loss of balance, numbness, paresthesias and seizures.   Psychiatric/Behavioral: Negative for depression, memory loss and substance abuse. The patient is not nervous/anxious.    Allergic/Immunologic: Negative for environmental allergies.          PAST MEDICAL HISTORY:  Past Medical History:   Diagnosis Date    COPD (chronic obstructive pulmonary disease)     CVA (cerebral vascular accident)     Depression        PAST SURGICAL HISTORY:  Past Surgical History:   Procedure Laterality Date    EYE SURGERY         SOCIAL HISTORY:  Social History     Socioeconomic History    Marital status:    Tobacco Use    Smoking status: Current Every Day Smoker     Packs/day: 0.75     Years: 53.00     Pack years: 39.75     Types: Cigarettes    Smokeless tobacco: Never Used   Substance and Sexual Activity    Alcohol use: Yes    Drug use: Yes     Types: Marijuana    Sexual activity: Never       FAMILY HISTORY:  Family History   Problem Relation Age of Onset    Cancer Mother     Cancer Maternal Aunt     Heart disease Maternal Uncle     Birth defects Maternal Grandmother        ALLERGIES:  Allergies as of 07/21/2022    (No Known Allergies)         MEDICATIONS:  Current Outpatient Medications on File Prior to Visit   Medication Sig Dispense Refill    DULoxetine (CYMBALTA) 60 MG capsule TAKE 1 CAPSULE BY MOUTH EVERY DAY 90 capsule 1    fluticasone propionate (FLONASE) 50 mcg/actuation nasal spray 2 SPRAYS IN EACH NOSTRIL DAILY 16 mL 5    glycopyrrolate-formoteroL (BEVESPI AEROSPHERE) 9-4.8 mcg HFAA Inhale 2 puffs into the lungs 2 (two) times daily. Controller 10.7 g 5    hydroCHLOROthiazide (HYDRODIURIL) 12.5 MG Tab TAKE 1 TABLET BY MOUTH EVERY DAY IN THE MORNING 30 tablet  5    VENTOLIN HFA 90 mcg/actuation inhaler INHALE 2 PUFFS INTO THE LUNGS EVERY 6 (SIX) HOURS AS NEEDED FOR WHEEZING. RESCUE 18 g 3     No current facility-administered medications on file prior to visit.          PHYSICAL EXAM:  There were no vitals taken for this visit.  Wt Readings from Last 3 Encounters:   07/11/22 39.8 kg (87 lb 12.8 oz)   06/28/22 39 kg (86 lb)   12/28/21 44 kg (97 lb)      There is no height or weight on file to calculate BMI.    Physical Exam  Vitals and nursing note reviewed.   Constitutional:       Appearance: Normal appearance. He is normal weight.   HENT:      Head: Normocephalic and atraumatic.      Right Ear: External ear normal.      Left Ear: External ear normal.   Eyes:      General: No scleral icterus.        Right eye: No discharge.         Left eye: No discharge.      Extraocular Movements: Extraocular movements intact.      Conjunctiva/sclera: Conjunctivae normal.      Pupils: Pupils are equal, round, and reactive to light.   Cardiovascular:      Rate and Rhythm: Normal rate and regular rhythm.      Pulses: Normal pulses.      Heart sounds: Normal heart sounds. No murmur heard.    No friction rub. No gallop.   Pulmonary:      Effort: Pulmonary effort is normal.      Breath sounds: Normal breath sounds. No wheezing, rhonchi or rales.   Chest:      Chest wall: No tenderness.   Abdominal:      General: Abdomen is flat. Bowel sounds are normal. There is no distension.      Palpations: Abdomen is soft.      Tenderness: There is no abdominal tenderness. There is no guarding or rebound.   Musculoskeletal:         General: No swelling or tenderness. Normal range of motion.      Cervical back: Normal range of motion and neck supple.   Skin:     General: Skin is warm and dry.      Findings: No erythema or rash.   Neurological:      General: No focal deficit present.      Mental Status: He is alert and oriented to person, place, and time.      Cranial Nerves: No cranial nerve deficit.       Motor: No weakness.      Gait: Gait normal.   Psychiatric:         Mood and Affect: Mood normal.         Behavior: Behavior normal.         Thought Content: Thought content normal.         Judgment: Judgment normal.          LABS REVIEWED:  Lab Results   Component Value Date    WBC 9.83 06/28/2022    RBC 6.16 06/28/2022    HGB 21.0 (HH) 06/28/2022    HCT 59.5 (HH) 06/28/2022    MCV 96.6 (H) 06/28/2022    MCH 34.1 (H) 06/28/2022    MCHC 35.3 06/28/2022    RDW 18.9 (H) 06/28/2022     06/28/2022    MPV 12.0 06/28/2022    NRBC 0.0 06/28/2022     Lab Results   Component Value Date     (L) 06/28/2022    K 3.9 06/28/2022    CL 93 (L) 06/28/2022    CO2 29 06/28/2022    BUN 9 06/28/2022     Lab Results   Component Value Date    AST 33 06/28/2022    ALT 25 06/28/2022     Lab Results   Component Value Date    GLU 91 06/28/2022     No results found for: CHOL, HDL, LDL, TRIG, CHOLHDL    CARDIAC STUDIES REVIEWED:  EKG  7/21/22 -  NSR.  Right atrial enlargement. Rightward axis.  Pulmonary disease pattern.             6/28/22 - Possible right atrial abnormality.  Possible right ventricular hypertrophy.       OTHER IMAGING STUDIES REVIEWED:      ASSESSMENT:   SOB (shortness of breath)  -     Ambulatory referral/consult to Cardiology    Abnormal ECG  -     Ambulatory referral/consult to Cardiology      PLAN:  1.  Abnormal EKG with atypical chest pain - un able to walk more than twenty feet due to SOB, will order Lexiscan.  Start aspirin 81 mg daily   2.  Shortness of breath - will order echo to evaluate for pulmonary hypertension, structural heart disease   3.  Smoking - decrease to 6 cigarettes daily, unable to pick stop date, however thinks he can cut down  4. Hypertension. Controlled, continue to monitor

## 2022-07-22 ENCOUNTER — TELEPHONE (OUTPATIENT)
Dept: FAMILY MEDICINE | Facility: CLINIC | Age: 62
End: 2022-07-22
Payer: MEDICAID

## 2022-07-22 DIAGNOSIS — I77.1 STENOSIS OF ILIAC ARTERY: Primary | ICD-10-CM

## 2022-07-22 DIAGNOSIS — R17 ELEVATED BILIRUBIN: Primary | ICD-10-CM

## 2022-07-22 DIAGNOSIS — R63.4 WEIGHT LOSS: ICD-10-CM

## 2022-07-22 PROBLEM — R06.02 SOB (SHORTNESS OF BREATH): Status: ACTIVE | Noted: 2022-07-22

## 2022-07-22 PROBLEM — R94.31 ABNORMAL ECG: Status: ACTIVE | Noted: 2022-07-22

## 2022-07-22 PROBLEM — I10 ESSENTIAL HYPERTENSION: Status: ACTIVE | Noted: 2022-07-22

## 2022-07-22 PROBLEM — R42 DIZZINESS: Status: ACTIVE | Noted: 2022-07-22

## 2022-07-22 PROBLEM — R00.2 PALPITATIONS: Status: ACTIVE | Noted: 2022-07-22

## 2022-07-22 RX ORDER — ASPIRIN 81 MG/1
81 TABLET ORAL DAILY
Qty: 90 TABLET | Refills: 1 | Status: SHIPPED | OUTPATIENT
Start: 2022-07-22 | End: 2023-02-18

## 2022-07-22 NOTE — TELEPHONE ENCOUNTER
----- Message from Jose Manuel Castro MD sent at 7/12/2022  7:50 AM CDT -----  Please initiated GI evaluation as soon as possible for weight loss an elevated bilirubin.

## 2022-07-22 NOTE — TELEPHONE ENCOUNTER
----- Message from Jose Manuel Castro MD sent at 7/14/2022  7:59 AM CDT -----  GI evaluation for elevated bilirubin, CT scan of the abdomen is negative except for prominent calcifications of the aorta and possible stenosis of his iliac artery.  I recommend a vascular evaluation as well.

## 2022-07-22 NOTE — TELEPHONE ENCOUNTER
----- Message from Jose Manuel Castro MD sent at 7/13/2022 11:29 AM CDT -----  Negative low-dose CT scan

## 2022-07-27 ENCOUNTER — HOSPITAL ENCOUNTER (OUTPATIENT)
Dept: CARDIOLOGY | Facility: HOSPITAL | Age: 62
Discharge: HOME | End: 2022-07-27
Attending: INTERNAL MEDICINE
Payer: MEDICAID

## 2022-07-27 ENCOUNTER — HOSPITAL ENCOUNTER (OUTPATIENT)
Dept: RADIOLOGY | Facility: HOSPITAL | Age: 62
Discharge: HOME | End: 2022-07-27
Attending: INTERNAL MEDICINE
Payer: MEDICAID

## 2022-07-27 VITALS
DIASTOLIC BLOOD PRESSURE: 79 MMHG | BODY MASS INDEX: 13.18 KG/M2 | SYSTOLIC BLOOD PRESSURE: 139 MMHG | HEIGHT: 69 IN | WEIGHT: 89 LBS | HEART RATE: 73 BPM

## 2022-07-27 VITALS — HEIGHT: 69 IN | BODY MASS INDEX: 13.18 KG/M2 | WEIGHT: 89 LBS

## 2022-07-27 DIAGNOSIS — R06.02 SOB (SHORTNESS OF BREATH): ICD-10-CM

## 2022-07-27 DIAGNOSIS — R94.31 ABNORMAL ECG: ICD-10-CM

## 2022-07-27 PROCEDURE — 93306 TTE W/DOPPLER COMPLETE: CPT | Mod: 26,,, | Performed by: INTERNAL MEDICINE

## 2022-07-27 PROCEDURE — 93306 ECHO (CUPID ONLY): ICD-10-PCS | Mod: 26,,, | Performed by: INTERNAL MEDICINE

## 2022-07-27 PROCEDURE — 93018 CV STRESS TEST I&R ONLY: CPT | Mod: ,,, | Performed by: INTERNAL MEDICINE

## 2022-07-27 PROCEDURE — 93017 CV STRESS TEST TRACING ONLY: CPT

## 2022-07-27 PROCEDURE — 93016 NUCLEAR STRESS TEST (CUPID ONLY): ICD-10-PCS | Mod: ,,, | Performed by: NURSE PRACTITIONER

## 2022-07-27 PROCEDURE — 63600175 PHARM REV CODE 636 W HCPCS: Performed by: INTERNAL MEDICINE

## 2022-07-27 PROCEDURE — A9500 TC99M SESTAMIBI: HCPCS

## 2022-07-27 PROCEDURE — 78452 HT MUSCLE IMAGE SPECT MULT: CPT | Mod: 26,,, | Performed by: INTERNAL MEDICINE

## 2022-07-27 PROCEDURE — 78452 NM MYOCARDIAL PERFUSION SPECT MULTI STUDY: ICD-10-PCS | Mod: 26,,, | Performed by: INTERNAL MEDICINE

## 2022-07-27 PROCEDURE — 93306 TTE W/DOPPLER COMPLETE: CPT

## 2022-07-27 PROCEDURE — 93016 CV STRESS TEST SUPVJ ONLY: CPT | Mod: ,,, | Performed by: NURSE PRACTITIONER

## 2022-07-27 PROCEDURE — 93018 NUCLEAR STRESS TEST (CUPID ONLY): ICD-10-PCS | Mod: ,,, | Performed by: INTERNAL MEDICINE

## 2022-07-27 RX ORDER — REGADENOSON 0.08 MG/ML
0.4 INJECTION, SOLUTION INTRAVENOUS ONCE
Status: COMPLETED | OUTPATIENT
Start: 2022-07-27 | End: 2022-07-27

## 2022-07-27 RX ADMIN — REGADENOSON 0.4 MG: 0.08 INJECTION, SOLUTION INTRAVENOUS at 09:07

## 2022-08-01 LAB
AV INDEX (PROSTH): 0.65
AV MEAN GRADIENT: 2 MMHG
AV PEAK GRADIENT: 4 MMHG
AV VALVE AREA: 1.81 CM2
AV VELOCITY RATIO: 0.6
BSA FOR ECHO PROCEDURE: 1.4 M2
CV ECHO LV RWT: 0.33 CM
CV STRESS BASE HR: 73 BPM
DIASTOLIC BLOOD PRESSURE: 79 MMHG
DOP CALC AO PEAK VEL: 1 M/S
DOP CALC AO VTI: 18.08 CM
DOP CALC LVOT AREA: 2.8 CM2
DOP CALC LVOT DIAMETER: 1.88 CM
DOP CALC LVOT PEAK VEL: 0.6 M/S
DOP CALC LVOT STROKE VOLUME: 32.68 CM3
DOP CALCLVOT PEAK VEL VTI: 11.78 CM
E WAVE DECELERATION TIME: 208 MSEC
ECHO LV POSTERIOR WALL: 0.68 CM (ref 0.6–1.1)
EJECTION FRACTION: 60 %
FRACTIONAL SHORTENING: 31 % (ref 28–44)
INTERVENTRICULAR SEPTUM: 0.43 CM (ref 0.6–1.1)
LEFT ATRIUM SIZE: 1.72 CM
LEFT INTERNAL DIMENSION IN SYSTOLE: 2.81 CM (ref 2.1–4)
LEFT VENTRICLE DIASTOLIC VOLUME INDEX: 49.93 ML/M2
LEFT VENTRICLE DIASTOLIC VOLUME: 72.9 ML
LEFT VENTRICLE MASS INDEX: 41 G/M2
LEFT VENTRICLE SYSTOLIC VOLUME INDEX: 20.4 ML/M2
LEFT VENTRICLE SYSTOLIC VOLUME: 29.8 ML
LEFT VENTRICULAR INTERNAL DIMENSION IN DIASTOLE: 4.07 CM (ref 3.5–6)
LEFT VENTRICULAR MASS: 60.15 G
LVOT MG: 0.8 MMHG
MV PEAK E VEL: 0.73 M/S
OHS CV CPX 1 MINUTE RECOVERY HEART RATE: 100 BPM
OHS CV CPX 85 PERCENT MAX PREDICTED HEART RATE MALE: 133
OHS CV CPX ESTIMATED METS: 10
OHS CV CPX MAX PREDICTED HEART RATE: 157
OHS CV CPX PATIENT IS FEMALE: 0
OHS CV CPX PATIENT IS MALE: 1
OHS CV CPX PEAK DIASTOLIC BLOOD PRESSURE: 94 MMHG
OHS CV CPX PEAK HEAR RATE: 109 BPM
OHS CV CPX PEAK RATE PRESSURE PRODUCT: NORMAL
OHS CV CPX PEAK SYSTOLIC BLOOD PRESSURE: 169 MMHG
OHS CV CPX PERCENT MAX PREDICTED HEART RATE ACHIEVED: 69
OHS CV CPX RATE PRESSURE PRODUCT PRESENTING: NORMAL
RIGHT ATRIAL AREA: 4.4 CM2
RIGHT VENTRICULAR END-DIASTOLIC DIMENSION: 2.6 CM
SYSTOLIC BLOOD PRESSURE: 139 MMHG

## 2022-08-23 ENCOUNTER — APPOINTMENT (OUTPATIENT)
Dept: RADIOLOGY | Facility: CLINIC | Age: 62
End: 2022-08-23
Attending: FAMILY MEDICINE
Payer: MEDICAID

## 2022-08-23 ENCOUNTER — OFFICE VISIT (OUTPATIENT)
Dept: FAMILY MEDICINE | Facility: CLINIC | Age: 62
End: 2022-08-23
Payer: MEDICAID

## 2022-08-23 VITALS
DIASTOLIC BLOOD PRESSURE: 92 MMHG | TEMPERATURE: 99 F | WEIGHT: 89 LBS | BODY MASS INDEX: 13.18 KG/M2 | SYSTOLIC BLOOD PRESSURE: 140 MMHG | HEART RATE: 74 BPM | OXYGEN SATURATION: 94 % | RESPIRATION RATE: 18 BRPM | HEIGHT: 69 IN

## 2022-08-23 DIAGNOSIS — M54.50 LOW BACK PAIN, UNSPECIFIED BACK PAIN LATERALITY, UNSPECIFIED CHRONICITY, UNSPECIFIED WHETHER SCIATICA PRESENT: Primary | ICD-10-CM

## 2022-08-23 DIAGNOSIS — M54.50 LOW BACK PAIN, UNSPECIFIED BACK PAIN LATERALITY, UNSPECIFIED CHRONICITY, UNSPECIFIED WHETHER SCIATICA PRESENT: ICD-10-CM

## 2022-08-23 DIAGNOSIS — J44.9 CHRONIC OBSTRUCTIVE PULMONARY DISEASE, UNSPECIFIED COPD TYPE: ICD-10-CM

## 2022-08-23 PROCEDURE — 1159F MED LIST DOCD IN RCRD: CPT | Mod: CPTII,,, | Performed by: FAMILY MEDICINE

## 2022-08-23 PROCEDURE — 99213 OFFICE O/P EST LOW 20 MIN: CPT | Mod: ,,, | Performed by: FAMILY MEDICINE

## 2022-08-23 PROCEDURE — 3077F SYST BP >= 140 MM HG: CPT | Mod: CPTII,,, | Performed by: FAMILY MEDICINE

## 2022-08-23 PROCEDURE — 1160F PR REVIEW ALL MEDS BY PRESCRIBER/CLIN PHARMACIST DOCUMENTED: ICD-10-PCS | Mod: CPTII,,, | Performed by: FAMILY MEDICINE

## 2022-08-23 PROCEDURE — 72100 X-RAY EXAM L-S SPINE 2/3 VWS: CPT | Mod: TC,RHCUB | Performed by: FAMILY MEDICINE

## 2022-08-23 PROCEDURE — 72100 XR LUMBAR SPINE AP AND LATERAL: ICD-10-PCS | Mod: 26,,, | Performed by: RADIOLOGY

## 2022-08-23 PROCEDURE — 3008F BODY MASS INDEX DOCD: CPT | Mod: CPTII,,, | Performed by: FAMILY MEDICINE

## 2022-08-23 PROCEDURE — 3008F PR BODY MASS INDEX (BMI) DOCUMENTED: ICD-10-PCS | Mod: CPTII,,, | Performed by: FAMILY MEDICINE

## 2022-08-23 PROCEDURE — 72100 X-RAY EXAM L-S SPINE 2/3 VWS: CPT | Mod: 26,,, | Performed by: RADIOLOGY

## 2022-08-23 PROCEDURE — 3080F PR MOST RECENT DIASTOLIC BLOOD PRESSURE >= 90 MM HG: ICD-10-PCS | Mod: CPTII,,, | Performed by: FAMILY MEDICINE

## 2022-08-23 PROCEDURE — 3080F DIAST BP >= 90 MM HG: CPT | Mod: CPTII,,, | Performed by: FAMILY MEDICINE

## 2022-08-23 PROCEDURE — 1160F RVW MEDS BY RX/DR IN RCRD: CPT | Mod: CPTII,,, | Performed by: FAMILY MEDICINE

## 2022-08-23 PROCEDURE — 3077F PR MOST RECENT SYSTOLIC BLOOD PRESSURE >= 140 MM HG: ICD-10-PCS | Mod: CPTII,,, | Performed by: FAMILY MEDICINE

## 2022-08-23 PROCEDURE — 1159F PR MEDICATION LIST DOCUMENTED IN MEDICAL RECORD: ICD-10-PCS | Mod: CPTII,,, | Performed by: FAMILY MEDICINE

## 2022-08-23 PROCEDURE — 99213 PR OFFICE/OUTPT VISIT, EST, LEVL III, 20-29 MIN: ICD-10-PCS | Mod: ,,, | Performed by: FAMILY MEDICINE

## 2022-08-23 NOTE — PROGRESS NOTES
Dereck Delgado is a 62 y.o. male seen today for follow-up on his COPD.  Patient reports the Breztri is helping and he is down to approximately 12 cigarettes a day.  Again I encouraged him to continue to decrease his cigarette load.  Patient's cardiac workup is ongoing and he will be seen by vascular surgery for evidence the PAD on his abdominal CT.  Patient's chest CT and abdominal CT did not note any significant lesions.  He is complaining of increased low back pain lately and has a long history of lumbar disc disease.  At this point he defers pain management.    Past Medical History:   Diagnosis Date    COPD (chronic obstructive pulmonary disease)     CVA (cerebral vascular accident)     Depression      Family History   Problem Relation Age of Onset    Cancer Mother     Cancer Maternal Aunt     Heart disease Maternal Uncle     Birth defects Maternal Grandmother      Current Outpatient Medications on File Prior to Visit   Medication Sig Dispense Refill    aspirin (ECOTRIN) 81 MG EC tablet Take 1 tablet (81 mg total) by mouth once daily. 90 tablet 1    DULoxetine (CYMBALTA) 60 MG capsule TAKE 1 CAPSULE BY MOUTH EVERY DAY 90 capsule 1    fluticasone propionate (FLONASE) 50 mcg/actuation nasal spray 2 SPRAYS IN EACH NOSTRIL DAILY 16 mL 5    hydroCHLOROthiazide (HYDRODIURIL) 12.5 MG Tab TAKE 1 TABLET BY MOUTH EVERY DAY IN THE MORNING 30 tablet 5    VENTOLIN HFA 90 mcg/actuation inhaler INHALE 2 PUFFS INTO THE LUNGS EVERY 6 (SIX) HOURS AS NEEDED FOR WHEEZING. RESCUE 18 g 5    [DISCONTINUED] glycopyrrolate-formoteroL (BEVESPI AEROSPHERE) 9-4.8 mcg HFAA Inhale 2 puffs into the lungs 2 (two) times daily. Controller 10.7 g 5     No current facility-administered medications on file prior to visit.     Immunization History   Administered Date(s) Administered    COVID-19, MRNA, LN-S, PF (MODERNA FULL 0.5 ML DOSE) 03/09/2021, 04/06/2021, 01/19/2022       Review of Systems   Constitutional: Negative for fever,  malaise/fatigue and weight loss.   Respiratory: Positive for shortness of breath.    Cardiovascular: Negative for chest pain and palpitations.   Gastrointestinal: Negative for nausea and vomiting.   Musculoskeletal: Positive for back pain and myalgias.   Psychiatric/Behavioral: Negative for depression.        Vitals:    08/23/22 1404   BP: (!) 140/92   Pulse: 74   Resp: 18   Temp: 98.7 °F (37.1 °C)       Physical Exam  Vitals reviewed.   Constitutional:       Appearance: Normal appearance.   HENT:      Head: Normocephalic.   Eyes:      Extraocular Movements: Extraocular movements intact.      Conjunctiva/sclera: Conjunctivae normal.      Pupils: Pupils are equal, round, and reactive to light.   Neck:      Thyroid: No thyroid mass or thyromegaly.   Cardiovascular:      Rate and Rhythm: Normal rate and regular rhythm.      Heart sounds: Normal heart sounds. No murmur heard.    No gallop.   Pulmonary:      Effort: Pulmonary effort is normal. No respiratory distress.      Breath sounds: Decreased air movement present. Decreased breath sounds present. No wheezing or rales.   Musculoskeletal:      Lumbar back: Spasms and tenderness present. Decreased range of motion.        Back:       Comments: Patient has an antalgic stooped gait.   Skin:     General: Skin is warm and dry.      Coloration: Skin is not jaundiced or pale.   Neurological:      Mental Status: He is alert.   Psychiatric:         Mood and Affect: Mood normal.         Behavior: Behavior normal.         Thought Content: Thought content normal.         Judgment: Judgment normal.          Assessment and Plan  Low back pain, unspecified back pain laterality, unspecified chronicity, unspecified whether sciatica present  -     X-Ray Lumbar Spine AP And Lateral; Future; Expected date: 08/23/2022    Chronic obstructive pulmonary disease, unspecified COPD type  -     budesonide-glycopyr-formoterol 160-9-4.8 mcg/actuation HFAA; Inhale 2 puffs into the lungs 2 (two)  times daily.  Dispense: 10.7 g; Refill: 5            Return to clinic in 1 month for follow-up or as needed once his x-ray report is in.    Health Maintenance Topics with due status: Not Due       Topic Last Completion Date    LDCT Lung Screen 07/13/2022    Influenza Vaccine Not Due

## 2022-08-24 ENCOUNTER — TELEPHONE (OUTPATIENT)
Dept: FAMILY MEDICINE | Facility: CLINIC | Age: 62
End: 2022-08-24
Payer: MEDICAID

## 2022-08-24 DIAGNOSIS — M54.50 LOW BACK PAIN, UNSPECIFIED BACK PAIN LATERALITY, UNSPECIFIED CHRONICITY, UNSPECIFIED WHETHER SCIATICA PRESENT: Primary | ICD-10-CM

## 2022-08-24 NOTE — TELEPHONE ENCOUNTER
----- Message from Jose Manuel Castro MD sent at 8/24/2022  7:59 AM CDT -----  Patient has progressive degenerative disc disease and a new compression fracture at L3.  I recommend an evaluation by pain management and also evaluated by Dr. Gresham.

## 2022-09-07 ENCOUNTER — OFFICE VISIT (OUTPATIENT)
Dept: SURGERY | Facility: CLINIC | Age: 62
End: 2022-09-07
Payer: MEDICAID

## 2022-09-07 VITALS — BODY MASS INDEX: 13.18 KG/M2 | HEIGHT: 69 IN | WEIGHT: 89 LBS

## 2022-09-07 DIAGNOSIS — I77.1 STENOSIS OF ILIAC ARTERY: ICD-10-CM

## 2022-09-07 DIAGNOSIS — I73.9 PVD (PERIPHERAL VASCULAR DISEASE): Primary | ICD-10-CM

## 2022-09-07 PROCEDURE — 1160F RVW MEDS BY RX/DR IN RCRD: CPT | Mod: CPTII,,, | Performed by: SURGERY

## 2022-09-07 PROCEDURE — 99213 OFFICE O/P EST LOW 20 MIN: CPT | Mod: S$PBB,,, | Performed by: SURGERY

## 2022-09-07 PROCEDURE — 1159F PR MEDICATION LIST DOCUMENTED IN MEDICAL RECORD: ICD-10-PCS | Mod: CPTII,,, | Performed by: SURGERY

## 2022-09-07 PROCEDURE — 99213 PR OFFICE/OUTPT VISIT, EST, LEVL III, 20-29 MIN: ICD-10-PCS | Mod: S$PBB,,, | Performed by: SURGERY

## 2022-09-07 PROCEDURE — 3008F BODY MASS INDEX DOCD: CPT | Mod: CPTII,,, | Performed by: SURGERY

## 2022-09-07 PROCEDURE — 3008F PR BODY MASS INDEX (BMI) DOCUMENTED: ICD-10-PCS | Mod: CPTII,,, | Performed by: SURGERY

## 2022-09-07 PROCEDURE — 1160F PR REVIEW ALL MEDS BY PRESCRIBER/CLIN PHARMACIST DOCUMENTED: ICD-10-PCS | Mod: CPTII,,, | Performed by: SURGERY

## 2022-09-07 PROCEDURE — 1159F MED LIST DOCD IN RCRD: CPT | Mod: CPTII,,, | Performed by: SURGERY

## 2022-09-07 PROCEDURE — 99213 OFFICE O/P EST LOW 20 MIN: CPT | Mod: PBBFAC | Performed by: SURGERY
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
## 2022-09-15 ENCOUNTER — HOSPITAL ENCOUNTER (OUTPATIENT)
Dept: RADIOLOGY | Facility: HOSPITAL | Age: 62
Discharge: HOME | End: 2022-09-15
Attending: SURGERY
Payer: MEDICAID

## 2022-09-15 DIAGNOSIS — I73.9 PVD (PERIPHERAL VASCULAR DISEASE): ICD-10-CM

## 2022-09-15 PROCEDURE — 93922 UPR/L XTREMITY ART 2 LEVELS: CPT | Mod: 26,,, | Performed by: SURGERY

## 2022-09-15 PROCEDURE — 93925 LOWER EXTREMITY STUDY: CPT | Mod: 26,,, | Performed by: SURGERY

## 2022-09-15 PROCEDURE — 93925 LOWER EXTREMITY STUDY: CPT | Mod: TC

## 2022-09-15 PROCEDURE — 93922 US ARTERIAL LOWER EXTREMITY BILAT WITH ABI (XPD): ICD-10-PCS | Mod: 26,,, | Performed by: SURGERY

## 2022-09-15 PROCEDURE — 93925 US ARTERIAL LOWER EXTREMITY BILAT WITH ABI (XPD): ICD-10-PCS | Mod: 26,,, | Performed by: SURGERY

## 2022-09-23 ENCOUNTER — OFFICE VISIT (OUTPATIENT)
Dept: FAMILY MEDICINE | Facility: CLINIC | Age: 62
End: 2022-09-23
Payer: MEDICAID

## 2022-09-23 VITALS
HEIGHT: 69 IN | DIASTOLIC BLOOD PRESSURE: 76 MMHG | WEIGHT: 91 LBS | BODY MASS INDEX: 13.48 KG/M2 | TEMPERATURE: 97 F | RESPIRATION RATE: 18 BRPM | OXYGEN SATURATION: 96 % | HEART RATE: 75 BPM | SYSTOLIC BLOOD PRESSURE: 124 MMHG

## 2022-09-23 DIAGNOSIS — F32.A DEPRESSION, UNSPECIFIED DEPRESSION TYPE: ICD-10-CM

## 2022-09-23 DIAGNOSIS — R63.4 WEIGHT LOSS: Primary | ICD-10-CM

## 2022-09-23 LAB
ALBUMIN SERPL BCP-MCNC: 3.5 G/DL (ref 3.5–5)
ALBUMIN/GLOB SERPL: 0.9 {RATIO}
ALP SERPL-CCNC: 89 U/L (ref 45–115)
ALT SERPL W P-5'-P-CCNC: 18 U/L (ref 16–61)
ANION GAP SERPL CALCULATED.3IONS-SCNC: 11 MMOL/L (ref 7–16)
AST SERPL W P-5'-P-CCNC: 15 U/L (ref 15–37)
BASOPHILS # BLD AUTO: 0.07 K/UL (ref 0–0.2)
BASOPHILS NFR BLD AUTO: 0.9 % (ref 0–1)
BILIRUB SERPL-MCNC: 0.6 MG/DL (ref ?–1.2)
BUN SERPL-MCNC: 14 MG/DL (ref 7–18)
BUN/CREAT SERPL: 17 (ref 6–20)
CALCIUM SERPL-MCNC: 9.2 MG/DL (ref 8.5–10.1)
CHLORIDE SERPL-SCNC: 98 MMOL/L (ref 98–107)
CO2 SERPL-SCNC: 30 MMOL/L (ref 21–32)
CREAT SERPL-MCNC: 0.84 MG/DL (ref 0.7–1.3)
DIFFERENTIAL METHOD BLD: ABNORMAL
EGFR (NO RACE VARIABLE) (RUSH/TITUS): 98 ML/MIN/1.73M²
EOSINOPHIL # BLD AUTO: 0.26 K/UL (ref 0–0.5)
EOSINOPHIL NFR BLD AUTO: 3.4 % (ref 1–4)
ERYTHROCYTE [DISTWIDTH] IN BLOOD BY AUTOMATED COUNT: 14.3 % (ref 11.5–14.5)
GLOBULIN SER-MCNC: 3.8 G/DL (ref 2–4)
GLUCOSE SERPL-MCNC: 76 MG/DL (ref 74–106)
HCT VFR BLD AUTO: 50.5 % (ref 40–54)
HGB BLD-MCNC: 17.5 G/DL (ref 13.5–18)
IMM GRANULOCYTES # BLD AUTO: 0.02 K/UL (ref 0–0.04)
IMM GRANULOCYTES NFR BLD: 0.3 % (ref 0–0.4)
LYMPHOCYTES # BLD AUTO: 1.47 K/UL (ref 1–4.8)
LYMPHOCYTES NFR BLD AUTO: 19.4 % (ref 27–41)
MCH RBC QN AUTO: 33.3 PG (ref 27–31)
MCHC RBC AUTO-ENTMCNC: 34.7 G/DL (ref 32–36)
MCV RBC AUTO: 96 FL (ref 80–96)
MONOCYTES # BLD AUTO: 0.83 K/UL (ref 0–0.8)
MONOCYTES NFR BLD AUTO: 10.9 % (ref 2–6)
MPC BLD CALC-MCNC: 11.8 FL (ref 9.4–12.4)
NEUTROPHILS # BLD AUTO: 4.93 K/UL (ref 1.8–7.7)
NEUTROPHILS NFR BLD AUTO: 65.1 % (ref 53–65)
NRBC # BLD AUTO: 0 X10E3/UL
NRBC, AUTO (.00): 0 %
PLATELET # BLD AUTO: 253 K/UL (ref 150–400)
POTASSIUM SERPL-SCNC: 3.2 MMOL/L (ref 3.5–5.1)
PROT SERPL-MCNC: 7.3 G/DL (ref 6.4–8.2)
RBC # BLD AUTO: 5.26 M/UL (ref 4.6–6.2)
SODIUM SERPL-SCNC: 136 MMOL/L (ref 136–145)
WBC # BLD AUTO: 7.58 K/UL (ref 4.5–11)

## 2022-09-23 PROCEDURE — 3008F PR BODY MASS INDEX (BMI) DOCUMENTED: ICD-10-PCS | Mod: CPTII,,, | Performed by: FAMILY MEDICINE

## 2022-09-23 PROCEDURE — 3078F PR MOST RECENT DIASTOLIC BLOOD PRESSURE < 80 MM HG: ICD-10-PCS | Mod: CPTII,,, | Performed by: FAMILY MEDICINE

## 2022-09-23 PROCEDURE — 1159F PR MEDICATION LIST DOCUMENTED IN MEDICAL RECORD: ICD-10-PCS | Mod: CPTII,,, | Performed by: FAMILY MEDICINE

## 2022-09-23 PROCEDURE — 3074F PR MOST RECENT SYSTOLIC BLOOD PRESSURE < 130 MM HG: ICD-10-PCS | Mod: CPTII,,, | Performed by: FAMILY MEDICINE

## 2022-09-23 PROCEDURE — 3078F DIAST BP <80 MM HG: CPT | Mod: CPTII,,, | Performed by: FAMILY MEDICINE

## 2022-09-23 PROCEDURE — 1160F PR REVIEW ALL MEDS BY PRESCRIBER/CLIN PHARMACIST DOCUMENTED: ICD-10-PCS | Mod: CPTII,,, | Performed by: FAMILY MEDICINE

## 2022-09-23 PROCEDURE — 3074F SYST BP LT 130 MM HG: CPT | Mod: CPTII,,, | Performed by: FAMILY MEDICINE

## 2022-09-23 PROCEDURE — 80053 COMPREHENSIVE METABOLIC PANEL: ICD-10-PCS | Mod: ,,, | Performed by: CLINICAL MEDICAL LABORATORY

## 2022-09-23 PROCEDURE — 1160F RVW MEDS BY RX/DR IN RCRD: CPT | Mod: CPTII,,, | Performed by: FAMILY MEDICINE

## 2022-09-23 PROCEDURE — 85025 CBC WITH DIFFERENTIAL: ICD-10-PCS | Mod: ,,, | Performed by: CLINICAL MEDICAL LABORATORY

## 2022-09-23 PROCEDURE — 1159F MED LIST DOCD IN RCRD: CPT | Mod: CPTII,,, | Performed by: FAMILY MEDICINE

## 2022-09-23 PROCEDURE — 99214 OFFICE O/P EST MOD 30 MIN: CPT | Mod: ,,, | Performed by: FAMILY MEDICINE

## 2022-09-23 PROCEDURE — 85025 COMPLETE CBC W/AUTO DIFF WBC: CPT | Mod: ,,, | Performed by: CLINICAL MEDICAL LABORATORY

## 2022-09-23 PROCEDURE — 80053 COMPREHEN METABOLIC PANEL: CPT | Mod: ,,, | Performed by: CLINICAL MEDICAL LABORATORY

## 2022-09-23 PROCEDURE — 3008F BODY MASS INDEX DOCD: CPT | Mod: CPTII,,, | Performed by: FAMILY MEDICINE

## 2022-09-23 PROCEDURE — 99214 PR OFFICE/OUTPT VISIT, EST, LEVL IV, 30-39 MIN: ICD-10-PCS | Mod: ,,, | Performed by: FAMILY MEDICINE

## 2022-09-23 RX ORDER — DULOXETIN HYDROCHLORIDE 60 MG/1
60 CAPSULE, DELAYED RELEASE ORAL DAILY
Qty: 90 CAPSULE | Refills: 1 | Status: SHIPPED | OUTPATIENT
Start: 2022-09-23 | End: 2022-12-23 | Stop reason: SDUPTHER

## 2022-09-23 NOTE — PROGRESS NOTES
Dereck Delgado is a 62 y.o. male seen today for follow-up on his COPD and hypertension.  Patient is doing well with no recent COPD exacerbations and is down to 8-10 cigarettes daily.  He will continue to wean himself off.  Patient's cardiac and vascular evaluation is ongoing.      Past Medical History:   Diagnosis Date    COPD (chronic obstructive pulmonary disease)     CVA (cerebral vascular accident)     Depression      Family History   Problem Relation Age of Onset    Cancer Mother     Cancer Maternal Aunt     Heart disease Maternal Uncle     Birth defects Maternal Grandmother      Current Outpatient Medications on File Prior to Visit   Medication Sig Dispense Refill    aspirin (ECOTRIN) 81 MG EC tablet Take 1 tablet (81 mg total) by mouth once daily. 90 tablet 1    budesonide-glycopyr-formoterol 160-9-4.8 mcg/actuation HFAA Inhale 2 puffs into the lungs 2 (two) times daily. 10.7 g 5    fluticasone propionate (FLONASE) 50 mcg/actuation nasal spray 2 SPRAYS IN EACH NOSTRIL DAILY 16 mL 5    hydroCHLOROthiazide (HYDRODIURIL) 12.5 MG Tab TAKE 1 TABLET BY MOUTH EVERY DAY IN THE MORNING 30 tablet 5    VENTOLIN HFA 90 mcg/actuation inhaler INHALE 2 PUFFS INTO THE LUNGS EVERY 6 (SIX) HOURS AS NEEDED FOR WHEEZING. RESCUE 18 g 5    [DISCONTINUED] DULoxetine (CYMBALTA) 60 MG capsule TAKE 1 CAPSULE BY MOUTH EVERY DAY 90 capsule 1     No current facility-administered medications on file prior to visit.     Immunization History   Administered Date(s) Administered    COVID-19, MRNA, LN-S, PF (MODERNA FULL 0.5 ML DOSE) 03/09/2021, 04/06/2021, 01/19/2022       Review of Systems   Constitutional:  Negative for fever, malaise/fatigue and weight loss.   Respiratory:  Positive for shortness of breath.    Cardiovascular:  Negative for chest pain and palpitations.   Gastrointestinal:  Negative for nausea and vomiting.   Musculoskeletal:  Positive for back pain and joint pain.   Psychiatric/Behavioral:  Negative for depression.        Vitals:    09/23/22 1316   BP: 124/76   Pulse: 75   Resp: 18   Temp: 97.2 °F (36.2 °C)       Physical Exam  Vitals reviewed.   Constitutional:       Appearance: Normal appearance.   HENT:      Head: Normocephalic.   Eyes:      Extraocular Movements: Extraocular movements intact.      Conjunctiva/sclera: Conjunctivae normal.      Pupils: Pupils are equal, round, and reactive to light.   Neck:      Thyroid: No thyroid mass or thyromegaly.   Cardiovascular:      Rate and Rhythm: Normal rate and regular rhythm.      Heart sounds: Normal heart sounds. No murmur heard.    No gallop.   Pulmonary:      Effort: Pulmonary effort is normal. No respiratory distress.      Breath sounds: Decreased air movement present. Decreased breath sounds present. No wheezing or rales.   Skin:     General: Skin is warm and dry.      Coloration: Skin is not jaundiced or pale.   Neurological:      Mental Status: He is alert.   Psychiatric:         Mood and Affect: Mood normal.         Behavior: Behavior normal.         Thought Content: Thought content normal.         Judgment: Judgment normal.        Assessment and Plan  Weight loss  -     CBC Auto Differential; Future; Expected date: 09/23/2022  -     Comprehensive Metabolic Panel; Future; Expected date: 09/23/2022    Depression, unspecified depression type  -     DULoxetine (CYMBALTA) 60 MG capsule; Take 1 capsule (60 mg total) by mouth once daily.  Dispense: 90 capsule; Refill: 1            Return to clinic in 3 months or as needed.  He is encouraged to continue to decrease his smoking intake.    Health Maintenance Topics with due status: Not Due       Topic Last Completion Date    LDCT Lung Screen 07/13/2022

## 2022-09-26 ENCOUNTER — TELEPHONE (OUTPATIENT)
Dept: FAMILY MEDICINE | Facility: CLINIC | Age: 62
End: 2022-09-26
Payer: MEDICAID

## 2022-09-26 NOTE — PROGRESS NOTES
Cardiology Clinic Note:    PCP: Jose Manuel Castro MD    REFERRING PHYSICIAN: Jose Manuel Castro MD    CHIEF COMPLAINT:  Shortness of breath      HISTORY OF PRESENT ILLNESS:  Dereck Delgado is a 62 y.o. male who presents for evaluation of shortness of breath     Pt states chest pain has resolved.   He is active with yard work and has shortness of breath, uses inhaler with some improvement in shortness of breath, does smoke cigars during yard work.  He has resumed normal activity including yard work. Pt has C-pap uses intermittently, is trying to increase the hours he wears it per night.   Had previous CVA 6/2018 with decreased vision, right eye, has lens implant.   Continues to smoke against medical advise, however  has decreased to 10-20 cigarettes daily.  Pt states nerve are bad.     Review of Systems   Constitutional: Positive for malaise/fatigue. Negative for diaphoresis, night sweats and weight gain.   HENT:  Negative for congestion, ear pain, hearing loss, nosebleeds and sore throat.    Eyes:  Negative for blurred vision, double vision, pain, photophobia and visual disturbance.   Cardiovascular:  Positive for chest pain. Negative for claudication, dyspnea on exertion, irregular heartbeat, leg swelling, near-syncope, orthopnea, palpitations and syncope.   Respiratory:  Positive for shortness of breath and wheezing. Negative for cough, sleep disturbances due to breathing and snoring.    Endocrine: Negative for cold intolerance, heat intolerance, polydipsia, polyphagia and polyuria.   Hematologic/Lymphatic: Negative for bleeding problem. Does not bruise/bleed easily.   Skin:  Positive for rash. Negative for dry skin, flushing, itching and skin cancer.   Musculoskeletal:  Negative for arthritis, back pain, falls, joint pain, muscle cramps, muscle weakness and myalgias.   Gastrointestinal:  Negative for abdominal pain, change in bowel habit, constipation, diarrhea, dysphagia, heartburn, nausea and  vomiting.   Genitourinary:  Negative for bladder incontinence, dysuria, flank pain, frequency and nocturia.   Neurological:  Negative for dizziness, focal weakness, headaches, light-headedness, loss of balance, numbness, paresthesias and seizures.   Psychiatric/Behavioral:  Negative for depression, memory loss and substance abuse. The patient is not nervous/anxious.    Allergic/Immunologic: Negative for environmental allergies.        PAST MEDICAL HISTORY:  Past Medical History:   Diagnosis Date    COPD (chronic obstructive pulmonary disease)     CVA (cerebral vascular accident)     Depression        PAST SURGICAL HISTORY:  Past Surgical History:   Procedure Laterality Date    EYE SURGERY         SOCIAL HISTORY:  Social History     Socioeconomic History    Marital status:    Occupational History     Comment: disabled   Tobacco Use    Smoking status: Every Day     Packs/day: 0.75     Years: 53.00     Pack years: 39.75     Types: Cigarettes    Smokeless tobacco: Never   Substance and Sexual Activity    Alcohol use: Yes    Drug use: Yes     Types: Marijuana    Sexual activity: Never       FAMILY HISTORY:  Family History   Problem Relation Age of Onset    Cancer Mother     Cancer Maternal Aunt     Heart disease Maternal Uncle     Birth defects Maternal Grandmother        ALLERGIES:  Allergies as of 09/27/2022    (No Known Allergies)         MEDICATIONS:  Current Outpatient Medications on File Prior to Visit   Medication Sig Dispense Refill    aspirin (ECOTRIN) 81 MG EC tablet Take 1 tablet (81 mg total) by mouth once daily. 90 tablet 1    budesonide-glycopyr-formoterol 160-9-4.8 mcg/actuation HFAA Inhale 2 puffs into the lungs 2 (two) times daily. 10.7 g 5    DULoxetine (CYMBALTA) 60 MG capsule Take 1 capsule (60 mg total) by mouth once daily. 90 capsule 1    fluticasone propionate (FLONASE) 50 mcg/actuation nasal spray 2 SPRAYS IN EACH NOSTRIL DAILY 16 mL 5    hydroCHLOROthiazide (HYDRODIURIL) 12.5 MG Tab TAKE 1  TABLET BY MOUTH EVERY DAY IN THE MORNING 30 tablet 5    VENTOLIN HFA 90 mcg/actuation inhaler INHALE 2 PUFFS INTO THE LUNGS EVERY 6 (SIX) HOURS AS NEEDED FOR WHEEZING. RESCUE 18 g 5     No current facility-administered medications on file prior to visit.          PHYSICAL EXAM:  There were no vitals taken for this visit.  Wt Readings from Last 3 Encounters:   09/23/22 41.3 kg (91 lb)   09/07/22 40.4 kg (89 lb)   08/23/22 40.4 kg (89 lb)      There is no height or weight on file to calculate BMI.    Physical Exam  Vitals and nursing note reviewed.   Constitutional:       Appearance: Normal appearance. He is normal weight.   HENT:      Head: Normocephalic and atraumatic.      Right Ear: External ear normal.      Left Ear: External ear normal.   Eyes:      General: No scleral icterus.        Right eye: No discharge.         Left eye: No discharge.      Extraocular Movements: Extraocular movements intact.      Conjunctiva/sclera: Conjunctivae normal.      Pupils: Pupils are equal, round, and reactive to light.   Cardiovascular:      Rate and Rhythm: Normal rate and regular rhythm.      Pulses: Normal pulses.      Heart sounds: Normal heart sounds. No murmur heard.    No friction rub. No gallop.   Pulmonary:      Effort: Pulmonary effort is normal.      Breath sounds: Normal breath sounds. No wheezing, rhonchi or rales.   Chest:      Chest wall: No tenderness.   Abdominal:      General: Abdomen is flat. Bowel sounds are normal. There is no distension.      Palpations: Abdomen is soft.      Tenderness: There is no abdominal tenderness. There is no guarding or rebound.   Musculoskeletal:         General: No swelling or tenderness. Normal range of motion.      Cervical back: Normal range of motion and neck supple.   Skin:     General: Skin is warm and dry.      Findings: No erythema or rash.   Neurological:      General: No focal deficit present.      Mental Status: He is alert and oriented to person, place, and time.       Cranial Nerves: No cranial nerve deficit.      Motor: No weakness.      Gait: Gait normal.   Psychiatric:         Mood and Affect: Mood normal.         Behavior: Behavior normal.         Thought Content: Thought content normal.         Judgment: Judgment normal.        LABS REVIEWED:  Lab Results   Component Value Date    WBC 7.58 09/23/2022    RBC 5.26 09/23/2022    HGB 17.5 09/23/2022    HCT 50.5 09/23/2022    MCV 96.0 09/23/2022    MCH 33.3 (H) 09/23/2022    MCHC 34.7 09/23/2022    RDW 14.3 09/23/2022     09/23/2022    MPV 11.8 09/23/2022    NRBC 0.0 09/23/2022     Lab Results   Component Value Date     09/23/2022    K 3.2 (L) 09/23/2022    CL 98 09/23/2022    CO2 30 09/23/2022    BUN 14 09/23/2022     Lab Results   Component Value Date    AST 15 09/23/2022    ALT 18 09/23/2022     Lab Results   Component Value Date    GLU 76 09/23/2022     No results found for: CHOL, HDL, LDL, TRIG, CHOLHDL    CARDIAC STUDIES REVIEWED:  EKG  7/21/22 -  NSR.  Right atrial enlargement. Rightward axis.  Pulmonary disease pattern.             6/28/22 - Possible right atrial abnormality.  Possible right ventricular hypertrophy.     ECHO 7/27/22 -  Normal systolic function.  EF 60%.                                 Normal left ventricular diastolic function.   Normal right ventricular size.  LEXISCAN 7/27/22 -  Small, fixed apical perfusion defect suspicious for apical thinning artifact versus scar but no evidence of ischemia.                                     Diminished perfusion in the interventricular septum which remains unchanged in rest images consistent with artifact but no evidence of ischemia      OTHER IMAGING STUDIES REVIEWED:      ASSESSMENT:   There are no diagnoses linked to this encounter.    PLAN:  1.  Abnormal EKG with atypical chest pain - no inducible ischemia on stress imaging, ambulatory walk with pulse oximetry shows O2 sat drops from 96 to 92% with activity.  2.  Shortness of breath - normal  pulmonary pressures, LV function on echo, no  pulmonary hypertension or structural heart disease appreciated, will refer to pulmonary medicine .  3.  Smoking - decrease to 6 cigarettes daily, unable to pick stop date, however thinks he can cut down  4. Hypertension. Controlled, continue to monitor

## 2022-09-26 NOTE — TELEPHONE ENCOUNTER
----- Message from Jose Manuel Castro MD sent at 9/26/2022  7:53 AM CDT -----  Blood count is much better.  I recommend increasing intake of potassium rich foods and rechecking in 3 months.

## 2022-09-27 ENCOUNTER — OFFICE VISIT (OUTPATIENT)
Dept: CARDIOLOGY | Facility: CLINIC | Age: 62
End: 2022-09-27
Payer: MEDICAID

## 2022-09-27 VITALS
HEART RATE: 80 BPM | BODY MASS INDEX: 14.22 KG/M2 | SYSTOLIC BLOOD PRESSURE: 124 MMHG | DIASTOLIC BLOOD PRESSURE: 80 MMHG | WEIGHT: 96 LBS | OXYGEN SATURATION: 99 % | HEIGHT: 69 IN

## 2022-09-27 DIAGNOSIS — Z72.0 TOBACCO ABUSE DISORDER: ICD-10-CM

## 2022-09-27 DIAGNOSIS — I10 ESSENTIAL HYPERTENSION: ICD-10-CM

## 2022-09-27 DIAGNOSIS — R94.31 ABNORMAL ECG: ICD-10-CM

## 2022-09-27 DIAGNOSIS — J44.9 CHRONIC OBSTRUCTIVE PULMONARY DISEASE, UNSPECIFIED COPD TYPE: ICD-10-CM

## 2022-09-27 DIAGNOSIS — R06.02 SOB (SHORTNESS OF BREATH): Primary | ICD-10-CM

## 2022-09-27 PROCEDURE — 1160F RVW MEDS BY RX/DR IN RCRD: CPT | Mod: CPTII,,, | Performed by: INTERNAL MEDICINE

## 2022-09-27 PROCEDURE — 99214 PR OFFICE/OUTPT VISIT, EST, LEVL IV, 30-39 MIN: ICD-10-PCS | Mod: S$PBB,,, | Performed by: INTERNAL MEDICINE

## 2022-09-27 PROCEDURE — 1159F PR MEDICATION LIST DOCUMENTED IN MEDICAL RECORD: ICD-10-PCS | Mod: CPTII,,, | Performed by: INTERNAL MEDICINE

## 2022-09-27 PROCEDURE — 3079F DIAST BP 80-89 MM HG: CPT | Mod: CPTII,,, | Performed by: INTERNAL MEDICINE

## 2022-09-27 PROCEDURE — 3074F PR MOST RECENT SYSTOLIC BLOOD PRESSURE < 130 MM HG: ICD-10-PCS | Mod: CPTII,,, | Performed by: INTERNAL MEDICINE

## 2022-09-27 PROCEDURE — 1160F PR REVIEW ALL MEDS BY PRESCRIBER/CLIN PHARMACIST DOCUMENTED: ICD-10-PCS | Mod: CPTII,,, | Performed by: INTERNAL MEDICINE

## 2022-09-27 PROCEDURE — 3008F BODY MASS INDEX DOCD: CPT | Mod: CPTII,,, | Performed by: INTERNAL MEDICINE

## 2022-09-27 PROCEDURE — 1159F MED LIST DOCD IN RCRD: CPT | Mod: CPTII,,, | Performed by: INTERNAL MEDICINE

## 2022-09-27 PROCEDURE — 3079F PR MOST RECENT DIASTOLIC BLOOD PRESSURE 80-89 MM HG: ICD-10-PCS | Mod: CPTII,,, | Performed by: INTERNAL MEDICINE

## 2022-09-27 PROCEDURE — 99214 OFFICE O/P EST MOD 30 MIN: CPT | Mod: PBBFAC | Performed by: INTERNAL MEDICINE

## 2022-09-27 PROCEDURE — 99214 OFFICE O/P EST MOD 30 MIN: CPT | Mod: S$PBB,,, | Performed by: INTERNAL MEDICINE

## 2022-09-27 PROCEDURE — 3074F SYST BP LT 130 MM HG: CPT | Mod: CPTII,,, | Performed by: INTERNAL MEDICINE

## 2022-09-27 PROCEDURE — 3008F PR BODY MASS INDEX (BMI) DOCUMENTED: ICD-10-PCS | Mod: CPTII,,, | Performed by: INTERNAL MEDICINE

## 2022-09-28 ENCOUNTER — OFFICE VISIT (OUTPATIENT)
Dept: SURGERY | Facility: CLINIC | Age: 62
End: 2022-09-28
Payer: MEDICAID

## 2022-09-28 DIAGNOSIS — I73.9 CLAUDICATION: ICD-10-CM

## 2022-09-28 DIAGNOSIS — Z72.0 TOBACCO ABUSE DISORDER: ICD-10-CM

## 2022-09-28 DIAGNOSIS — I73.9 PVD (PERIPHERAL VASCULAR DISEASE): Primary | ICD-10-CM

## 2022-09-28 PROCEDURE — 99214 OFFICE O/P EST MOD 30 MIN: CPT | Mod: PBBFAC | Performed by: SURGERY

## 2022-09-28 PROCEDURE — 99213 PR OFFICE/OUTPT VISIT, EST, LEVL III, 20-29 MIN: ICD-10-PCS | Mod: S$PBB,,, | Performed by: SURGERY

## 2022-09-28 PROCEDURE — 99213 OFFICE O/P EST LOW 20 MIN: CPT | Mod: S$PBB,,, | Performed by: SURGERY

## 2022-09-28 RX ORDER — SODIUM CHLORIDE 9 MG/ML
INJECTION, SOLUTION INTRAVENOUS CONTINUOUS
Status: CANCELLED | OUTPATIENT
Start: 2022-09-28

## 2022-09-28 NOTE — PATIENT INSTRUCTIONS
[facility]                                                                                                                                                                                                                                                                                                                                                                                                                                                                         Preoperative Instructions        Your pre-op lab work  will be on Oct 17th on the 1st floor of the [facility] building.                                                                                  Day of Surgery      Your surgery is scheduled for Oct 20th at [facility] on the ground floor of the Ambulatory building.     Your arrival time is 10:00.              DO NOT EAT OR DRINK ANYTHING AFTER MIDNIGHT.          You may take blood pressure medication with a small drink of water the morning of surgery.                                 DO NOT TAKE INSULIN OR ANY OTHER BLOOD SUGAR MEDICATIONS.           The following blood sugar medications have to be stopped 48 hours prior to surgery:                    Metformin        Glucovance          Metaglip             Fortamet           Glucophage                   Riomet             Avandamet          Glimepiride              IF YOU ARE ON ANY OF THESE BLOOD THINNERS, MAKE SURE YOUR PHYSICIAN IS AWARE.                Eliquis/Apixaban             Xarelto/Rivaroxaban             Plavix/Clopidogrel                  Wafarin/Coumadin,Jantoven           Pletal/Cilostazol              Pradaxa/Dibigatran                           PLEASE USE CHLORHEXIDINE WASH THE NIGHT BEFORE SURGERY AND THE MORNING OF  SURGERY.             YOU CANNOT DRIVE YOURSELF HOME FROM THE HOSPITAL THE DAY OF SURGERY.        Please have a  with you.           Bring all medications, that you are currently taking, with you to the hospital the morning of your procedure.           Please leave all valuables at home.          Children under the age of 18 must be accompanied by an adult.          PLEASE UNDERSTAND THAT OUR OFFICE DOES NOT GIVE PATHOLOGY RESULTS OR TEST RESULTS OVER THE PHONE.         THIS WILL BE DISCUSSED WITH YOU ON YOUR FOLLOW UP APPOINTMENT TO DISCUSS IN PERSON.

## 2022-09-28 NOTE — H&P (VIEW-ONLY)
Subjective:       Patient ID: Dereck Delgado is a 62 y.o. male.    Chief Complaint: Follow-up (PVD)  Follow-up JADEN 0.6 and 0.7    S previous CT abdomen pelvis show significant distal aortic and bilateral iliac disease is see inhibiting his working, no previous vascular interventions he continues to smoke     Pros and cons iliac angioplasty and angiogram discussed bleeding infection damage to vessels deployment of stents agrees to proceed  family history includes Birth defects in his maternal grandmother; Cancer in his maternal aunt and mother; Heart disease in his maternal uncle.  Past Medical History:   Diagnosis Date    COPD (chronic obstructive pulmonary disease)     CVA (cerebral vascular accident)     Depression       Past Surgical History:   Procedure Laterality Date    EYE SURGERY         reports that he has been smoking cigarettes. He has a 39.75 pack-year smoking history. He has never used smokeless tobacco. He reports current alcohol use. He reports current drug use. Drug: Marijuana.   HPI  Review of Systems   Respiratory:  Positive for shortness of breath.    Cardiovascular:  Positive for chest pain and claudication.   All other systems reviewed and are negative.      Objective:      There were no vitals taken for this visit.   Physical Exam  Constitutional:       Appearance: Normal appearance.   HENT:      Head: Normocephalic.   Cardiovascular:      Rate and Rhythm: Normal rate.      Pulses: Normal pulses.      Comments: Bilateral decreased pedal pulses, no wounds or tissue loss  Abdominal:      General: Abdomen is flat.   Musculoskeletal:         General: Normal range of motion.      Right lower leg: No edema.      Left lower leg: No edema.   Skin:     General: Skin is warm and dry.      Capillary Refill: Capillary refill takes less than 2 seconds.   Neurological:      General: No focal deficit present.      Mental Status: He is alert and oriented to person, place, and time.   Psychiatric:          Mood and Affect: Mood normal.         Behavior: Behavior normal.         Thought Content: Thought content normal.         Judgment: Judgment normal.         Assessment:       1. PVD (peripheral vascular disease)    2. Tobacco abuse disorder    3. Claudication        Plan:       Angiogram probable bilateral iliac interventions

## 2022-10-17 ENCOUNTER — OFFICE VISIT (OUTPATIENT)
Dept: PAIN MEDICINE | Facility: CLINIC | Age: 62
End: 2022-10-17
Payer: MEDICAID

## 2022-10-17 VITALS
BODY MASS INDEX: 14.7 KG/M2 | DIASTOLIC BLOOD PRESSURE: 84 MMHG | SYSTOLIC BLOOD PRESSURE: 158 MMHG | WEIGHT: 97 LBS | HEIGHT: 68 IN | HEART RATE: 66 BPM

## 2022-10-17 DIAGNOSIS — M54.50 LOW BACK PAIN, UNSPECIFIED BACK PAIN LATERALITY, UNSPECIFIED CHRONICITY, UNSPECIFIED WHETHER SCIATICA PRESENT: Chronic | ICD-10-CM

## 2022-10-17 DIAGNOSIS — I73.9 PVD (PERIPHERAL VASCULAR DISEASE): Chronic | ICD-10-CM

## 2022-10-17 DIAGNOSIS — Z79.899 ENCOUNTER FOR LONG-TERM (CURRENT) USE OF OTHER MEDICATIONS: Primary | ICD-10-CM

## 2022-10-17 LAB

## 2022-10-17 PROCEDURE — 99204 OFFICE O/P NEW MOD 45 MIN: CPT | Mod: S$PBB,,, | Performed by: PAIN MEDICINE

## 2022-10-17 PROCEDURE — G0481 PR DRUG TEST DEF 8-14 CLASSES: ICD-10-PCS | Mod: ,,, | Performed by: CLINICAL MEDICAL LABORATORY

## 2022-10-17 PROCEDURE — 99204 PR OFFICE/OUTPT VISIT, NEW, LEVL IV, 45-59 MIN: ICD-10-PCS | Mod: S$PBB,,, | Performed by: PAIN MEDICINE

## 2022-10-17 PROCEDURE — 80305 DRUG TEST PRSMV DIR OPT OBS: CPT | Mod: PBBFAC | Performed by: PAIN MEDICINE

## 2022-10-17 PROCEDURE — G0481 DRUG TEST DEF 8-14 CLASSES: HCPCS | Mod: ,,, | Performed by: CLINICAL MEDICAL LABORATORY

## 2022-10-17 PROCEDURE — 99214 OFFICE O/P EST MOD 30 MIN: CPT | Mod: PBBFAC | Performed by: PAIN MEDICINE

## 2022-10-17 PROCEDURE — 3077F PR MOST RECENT SYSTOLIC BLOOD PRESSURE >= 140 MM HG: ICD-10-PCS | Mod: CPTII,,, | Performed by: PAIN MEDICINE

## 2022-10-17 PROCEDURE — 3079F DIAST BP 80-89 MM HG: CPT | Mod: CPTII,,, | Performed by: PAIN MEDICINE

## 2022-10-17 PROCEDURE — 3077F SYST BP >= 140 MM HG: CPT | Mod: CPTII,,, | Performed by: PAIN MEDICINE

## 2022-10-17 PROCEDURE — 3079F PR MOST RECENT DIASTOLIC BLOOD PRESSURE 80-89 MM HG: ICD-10-PCS | Mod: CPTII,,, | Performed by: PAIN MEDICINE

## 2022-10-17 RX ORDER — ACETAMINOPHEN AND CODEINE PHOSPHATE 300; 30 MG/1; MG/1
1 TABLET ORAL EVERY 12 HOURS PRN
Qty: 30 TABLET | Refills: 0 | Status: SHIPPED | OUTPATIENT
Start: 2022-10-17 | End: 2022-11-01

## 2022-10-17 NOTE — PROGRESS NOTES
"Chronic Pain - New Consult    Referring Physician: Jose Manuel Castro MD       SUBJECTIVE: Disclaimer: This note has been generated using voice-recognition software. There may be typographical errors that have been missed during proof-reading      Initial encounter:    Dereck Delgado presents to the clinic for the evaluation of lower back, bilateral lower extremity and feet pain.       62-year-old male presents for new patient evaluation and consultation from Dr. Castro.  Patient has multiple medical problems including COPD, peripheral vascular disease, smoker and chronic pain.  He is awaiting vascular stent placement by Dr. Diaz in 3 days for peripheral vascular disease.  He complains of bilateral lower extremity pain that is exacerbated with walking and prolonged standing. He a long history of lower back pain secondary to osteoarthritis. OTC tylenol does not provide relief.   He reports claustrophobia,  anxiety and headaches and self medicates with THC and has used marijuana  since the age of 10.  He was referred to our clinic for medication management.    Pain Assessment  Pain Assessment: 0-10  Pain Score:   4  Pain Location: Other (Comment) (lower back)  Pain Orientation: Right, Left  Pain Radiating Towards: radiates down bilateral legs to feet  Pain Descriptors: Aching, Constant, Radiating  Pain Frequency: Continuous  Pain Onset: Awakened from sleep  Clinical Progression: Not changed  Aggravating Factors: Standing, Walking      Physical Therapy/Home Exercise: no        Pain Medications:  has a current medication list which includes the following prescription(s): aspirin, budesonide-glycopyr-formoterol, duloxetine, hydrochlorothiazide, ventolin hfa, acetaminophen-codeine 300-30mg, clopidogrel, and fluticasone propionate.      Tried in Past:  NSAIDS-no, patient requires anticoagulants  TCA-no  SNRI-yes  Anti-convulsants-no  Muscle Relaxants-no  Opioids-no  Benzodiazepines-no     4A"s of Opioid Risk " Assessment  Activity: Patient is unable to  perform  ADL  Analgesia:  Patient's pain is partially controlled by current medication.   Aberrant Behavior:  reviewed with no aberrant drug seeking/taking behavior     report:  Reviewed and consistent with medication use as prescribed.    Patient denies suicidal or homicidal ideations    Pain interventional therapy-no    Chiropractor -no  Acupuncture - no  TENS unit -no  Spinal decompression -no  Joint replacement -no     Review of Systems   Constitutional: Negative.    HENT: Negative.     Eyes: Negative.    Respiratory: Negative.     Cardiovascular: Negative.    Gastrointestinal: Negative.    Endocrine: Negative.    Genitourinary: Negative.    Musculoskeletal:  Positive for arthralgias, back pain, gait problem and leg pain (BLE).   Integumentary:  Negative.   Allergic/Immunologic: Negative.    Neurological:  Positive for numbness (BLE).   Hematological: Negative.    Psychiatric/Behavioral: Negative.             FL Limited Non-Billable  See Physician's Notes for results.     IMPRESSION: See Physician's Notes for results     This procedure was auto-finalized  See Physicians Notes for your results.         Past Medical History:   Diagnosis Date    COPD (chronic obstructive pulmonary disease)     CVA (cerebral vascular accident)     Depression      Past Surgical History:   Procedure Laterality Date    EYE SURGERY       Social History     Socioeconomic History    Marital status:    Occupational History     Comment: disabled   Tobacco Use    Smoking status: Every Day     Packs/day: 0.75     Years: 53.00     Pack years: 39.75     Types: Cigarettes    Smokeless tobacco: Never   Substance and Sexual Activity    Alcohol use: Yes    Drug use: Yes     Types: Marijuana    Sexual activity: Never     Family History   Problem Relation Age of Onset    Cancer Mother     Cancer Maternal Aunt     Heart disease Maternal Uncle     Birth defects Maternal Grandmother      Review of  "patient's allergies indicates:  No Known Allergies      OBJECTIVE:  Vitals:    10/17/22 1356   BP: (!) 158/84   Pulse: 66     BP (!) 158/84   Pulse 66   Ht 5' 8.4" (1.737 m)   Wt 44 kg (97 lb)   BMI 14.58 kg/m²   Physical Exam  Vitals and nursing note reviewed.   Constitutional:       General: He is not in acute distress.     Appearance: Normal appearance. He is not ill-appearing, toxic-appearing or diaphoretic.   HENT:      Head: Normocephalic and atraumatic.      Nose: Nose normal.      Mouth/Throat:      Mouth: Mucous membranes are moist.   Eyes:      Extraocular Movements: Extraocular movements intact.      Pupils: Pupils are equal, round, and reactive to light.   Cardiovascular:      Rate and Rhythm: Normal rate and regular rhythm.      Heart sounds: Normal heart sounds.   Pulmonary:      Effort: Pulmonary effort is normal. No respiratory distress.      Breath sounds: Normal breath sounds. No stridor. No wheezing or rhonchi.   Abdominal:      General: Bowel sounds are normal.      Palpations: Abdomen is soft.   Musculoskeletal:         General: No swelling or deformity.      Cervical back: Normal and normal range of motion. No spasms or tenderness. No pain with movement. Normal range of motion.      Thoracic back: Normal.      Lumbar back: Tenderness and bony tenderness present. No spasms. Decreased range of motion. Negative right straight leg raise test and negative left straight leg raise test. No scoliosis.      Right lower leg: No edema.      Left lower leg: No edema.      Comments: Lumbar pain with flexion and extension   Skin:     General: Skin is warm.   Neurological:      General: No focal deficit present.      Mental Status: He is alert and oriented to person, place, and time. Mental status is at baseline.      Cranial Nerves: No cranial nerve deficit.      Sensory: Sensation is intact. No sensory deficit.      Motor: No weakness.      Coordination: Coordination normal.      Gait: Gait normal.      " Deep Tendon Reflexes: Reflexes are normal and symmetric.   Psychiatric:         Mood and Affect: Mood normal.         Behavior: Behavior normal.          ASSESSMENT: 62 y.o. year old male with pain, consistent with     Encounter Diagnoses   Name Primary?    Low back pain, unspecified back pain laterality, unspecified chronicity, unspecified whether sciatica present     Encounter for long-term (current) use of other medications Yes    PVD (peripheral vascular disease)         PLAN:   1. reviewed  2..Addiction, Dependency, Tolerance, Opioid abuse-misuse, Death, Diversion Discussed. Overdose reversal drug Naloxone discussed  2.UDS point of care obtained for new patient evaluation and consultation. We will obtain a definitve UDS for confirmation.  3. Opioid contract signed today  4.Refill/ Continue medications for pain control and function.  We will prescribe Tylenol No. 3 until surgical intervention.  Patient is aware that after surgery if he continues to use THC, we will not be able to provide opioid management       Requested Prescriptions     Signed Prescriptions Disp Refills    acetaminophen-codeine 300-30mg (TYLENOL #3) 300-30 mg Tab 30 tablet 0     Sig: Take 1 tablet by mouth every 12 (twelve) hours as needed (pain).     5. Urine drug screen and confirmation testing was ordered as documented on the requisition form in order to verify medication compliance, test for illicit substances.      Orders Placed This Encounter   Procedures    Drug Screen Definitive 14, Urine     Standing Status:   Future     Number of Occurrences:   1     Standing Expiration Date:   12/16/2023     Order Specific Question:   Specimen Source     Answer:   Urine    POCT Urine Drug Screen Presump     Interpretive Information:     Negative:  No drug detected at the cut off level.   Positive:  This result represents presumptive positive for the   tested drug, other substances may yield a positive response other   than the analyte of  interest. This result should be utilized for   diagnostic purpose only. Confirmation testing will be performed upon physician request only.         6. Continue vascular workup as scheduled    The total time spent for evaluation and management on 10/25/2022 including reviewing separately obtained history, performing a medically appropriate exam and evaluation, documenting clinical information in the health record, independently interpreting results and communicating them to the patient/family/caregiver, and ordering medications/tests/procedures was between 15-29 minutes.    The above plan and management options were discussed at length with patient. Patient is in agreement with the above and verbalized understanding. It will be communicated with the referring physician via electronic record, fax, or mail.    Shonda Hopkins  10/25/2022

## 2022-10-19 LAB
6-ACETYLMORPHINE, URINE (RUSH): NEGATIVE 10 NG/ML
7-AMINOCLONAZEPAM, URINE (RUSH): NEGATIVE 25 NG/ML
A-HYDROXYALPRAZOLAM, URINE (RUSH): NEGATIVE 25 NG/ML
ACETYL FENTANYL, URINE (RUSH): NEGATIVE 2.5 NG/ML
ACETYL NORFENTANYL OXALATE, URINE (RUSH): NEGATIVE 5 NG/ML
AMPHET UR QL SCN: NEGATIVE
BENZOYLECGONINE, URINE (RUSH): NEGATIVE 100 NG/ML
BUPRENORPHINE UR QL SCN: NEGATIVE 25 NG/ML
CODEINE, URINE (RUSH): NEGATIVE 25 NG/ML
CREAT UR-MCNC: 48 MG/DL (ref 39–259)
EDDP, URINE (RUSH): NEGATIVE 25 NG/ML
FENTANYL, URINE (RUSH): NEGATIVE 2.5 NG/ML
HYDROCODONE, URINE (RUSH): NEGATIVE 25 NG/ML
HYDROMORPHONE, URINE (RUSH): NEGATIVE 25 NG/ML
LORAZEPAM, URINE (RUSH): NEGATIVE 25 NG/ML
METHADONE UR QL SCN: NEGATIVE 25 NG/ML
METHAMPHET UR QL SCN: NEGATIVE
MORPHINE, URINE (RUSH): NEGATIVE 25 NG/ML
NORBUPRENORPHINE, URINE (RUSH): NEGATIVE 25 NG/ML
NORDIAZEPAM, URINE (RUSH): NEGATIVE 25 NG/ML
NORFENTANYL OXALATE, URINE (RUSH): NEGATIVE 5 NG/ML
NORHYDROCODONE, URINE (RUSH): NEGATIVE 50 NG/ML
NOROXYCODONE HCL, URINE (RUSH): NEGATIVE 50 NG/ML
OXAZEPAM, URINE (RUSH): NEGATIVE 25 NG/ML
OXYCODONE UR QL SCN: NEGATIVE 25 NG/ML
OXYMORPHONE, URINE (RUSH): NEGATIVE 25 NG/ML
PH UR STRIP: 7.5 PH UNITS
SP GR UR STRIP: 1.01
TAPENTADOL, URINE (RUSH): NEGATIVE 25 NG/ML
TEMAZEPAM, URINE (RUSH): NEGATIVE 25 NG/ML
THC-COOH, URINE (RUSH): >250 25 NG/ML
TRAMADOL, URINE (RUSH): NEGATIVE 100 NG/ML

## 2022-10-20 ENCOUNTER — HOSPITAL ENCOUNTER (OUTPATIENT)
Facility: HOSPITAL | Age: 62
Discharge: HOME | End: 2022-10-21
Attending: SURGERY | Admitting: SURGERY
Payer: MEDICAID

## 2022-10-20 ENCOUNTER — ANESTHESIA (OUTPATIENT)
Dept: SURGERY | Facility: HOSPITAL | Age: 62
End: 2022-10-20
Payer: MEDICAID

## 2022-10-20 ENCOUNTER — ANESTHESIA EVENT (OUTPATIENT)
Dept: SURGERY | Facility: HOSPITAL | Age: 62
End: 2022-10-20
Payer: MEDICAID

## 2022-10-20 VITALS
DIASTOLIC BLOOD PRESSURE: 57 MMHG | OXYGEN SATURATION: 99 % | RESPIRATION RATE: 10 BRPM | SYSTOLIC BLOOD PRESSURE: 99 MMHG | HEART RATE: 86 BPM

## 2022-10-20 DIAGNOSIS — I73.9 CLAUDICATION: ICD-10-CM

## 2022-10-20 DIAGNOSIS — I73.9 PVD (PERIPHERAL VASCULAR DISEASE): ICD-10-CM

## 2022-10-20 LAB
INDIRECT COOMBS: NORMAL
RH BLD: NORMAL

## 2022-10-20 PROCEDURE — 37223 PR REVASCULARIZE ILIAC ARTERY,ANGIOPLASTY/STENT, EA ADD VESSEL: CPT | Mod: RT,,, | Performed by: SURGERY

## 2022-10-20 PROCEDURE — 36000707: Performed by: SURGERY

## 2022-10-20 PROCEDURE — 37221 PR REVASCULARIZE ILIAC ARTERY,ANGIOPLASTY/STENT, INITIAL VESSEL: CPT | Mod: 50,,, | Performed by: SURGERY

## 2022-10-20 PROCEDURE — D9220A PRA ANESTHESIA: ICD-10-PCS | Mod: CRNA,,, | Performed by: NURSE ANESTHETIST, CERTIFIED REGISTERED

## 2022-10-20 PROCEDURE — 36000706: Performed by: SURGERY

## 2022-10-20 PROCEDURE — 25500020 PHARM REV CODE 255: Performed by: SURGERY

## 2022-10-20 PROCEDURE — 99900035 HC TECH TIME PER 15 MIN (STAT)

## 2022-10-20 PROCEDURE — 27000221 HC OXYGEN, UP TO 24 HOURS

## 2022-10-20 PROCEDURE — C1876 STENT, NON-COA/NON-COV W/DEL: HCPCS | Performed by: SURGERY

## 2022-10-20 PROCEDURE — 37000009 HC ANESTHESIA EA ADD 15 MINS: Performed by: SURGERY

## 2022-10-20 PROCEDURE — 25000242 PHARM REV CODE 250 ALT 637 W/ HCPCS: Performed by: NURSE PRACTITIONER

## 2022-10-20 PROCEDURE — C1894 INTRO/SHEATH, NON-LASER: HCPCS | Performed by: SURGERY

## 2022-10-20 PROCEDURE — 25000003 PHARM REV CODE 250: Performed by: NURSE ANESTHETIST, CERTIFIED REGISTERED

## 2022-10-20 PROCEDURE — 25000003 PHARM REV CODE 250: Performed by: SURGERY

## 2022-10-20 PROCEDURE — D9220A PRA ANESTHESIA: Mod: CRNA,,, | Performed by: NURSE ANESTHETIST, CERTIFIED REGISTERED

## 2022-10-20 PROCEDURE — 25000003 PHARM REV CODE 250: Performed by: NURSE PRACTITIONER

## 2022-10-20 PROCEDURE — 37000008 HC ANESTHESIA 1ST 15 MINUTES: Performed by: SURGERY

## 2022-10-20 PROCEDURE — 63600175 PHARM REV CODE 636 W HCPCS: Performed by: SURGERY

## 2022-10-20 PROCEDURE — 27201423 OPTIME MED/SURG SUP & DEVICES STERILE SUPPLY: Performed by: SURGERY

## 2022-10-20 PROCEDURE — 63600175 PHARM REV CODE 636 W HCPCS: Performed by: NURSE ANESTHETIST, CERTIFIED REGISTERED

## 2022-10-20 PROCEDURE — 71000033 HC RECOVERY, INTIAL HOUR: Performed by: SURGERY

## 2022-10-20 PROCEDURE — D9220A PRA ANESTHESIA: ICD-10-PCS | Mod: ANES,,, | Performed by: ANESTHESIOLOGY

## 2022-10-20 PROCEDURE — 37223 PR REVASCULARIZE ILIAC ARTERY,ANGIOPLASTY/STENT, EA ADD VESSEL: ICD-10-PCS | Mod: RT,,, | Performed by: SURGERY

## 2022-10-20 PROCEDURE — 37221 PR REVASCULARIZE ILIAC ARTERY,ANGIOPLASTY/STENT, INITIAL VESSEL: ICD-10-PCS | Mod: 50,,, | Performed by: SURGERY

## 2022-10-20 PROCEDURE — 86901 BLOOD TYPING SEROLOGIC RH(D): CPT | Performed by: SURGERY

## 2022-10-20 PROCEDURE — 01924 ANES THER IVNTL RAD ARTL NOS: CPT | Performed by: SURGERY

## 2022-10-20 PROCEDURE — D9220A PRA ANESTHESIA: Mod: ANES,,, | Performed by: ANESTHESIOLOGY

## 2022-10-20 PROCEDURE — 71000039 HC RECOVERY, EACH ADD'L HOUR: Performed by: SURGERY

## 2022-10-20 PROCEDURE — C1874 STENT, COATED/COV W/DEL SYS: HCPCS | Performed by: SURGERY

## 2022-10-20 PROCEDURE — 94640 AIRWAY INHALATION TREATMENT: CPT

## 2022-10-20 PROCEDURE — 36415 COLL VENOUS BLD VENIPUNCTURE: CPT | Performed by: SURGERY

## 2022-10-20 PROCEDURE — C1769 GUIDE WIRE: HCPCS | Performed by: SURGERY

## 2022-10-20 DEVICE — IMPLANTABLE DEVICE: Type: IMPLANTABLE DEVICE | Site: GROIN | Status: FUNCTIONAL

## 2022-10-20 RX ORDER — PHENYLEPHRINE HYDROCHLORIDE 10 MG/ML
INJECTION INTRAVENOUS
Status: DISCONTINUED | OUTPATIENT
Start: 2022-10-20 | End: 2022-10-20

## 2022-10-20 RX ORDER — MEPERIDINE HYDROCHLORIDE 25 MG/ML
25 INJECTION INTRAMUSCULAR; INTRAVENOUS; SUBCUTANEOUS EVERY 10 MIN PRN
Status: DISCONTINUED | OUTPATIENT
Start: 2022-10-20 | End: 2022-10-20 | Stop reason: HOSPADM

## 2022-10-20 RX ORDER — ALBUTEROL SULFATE 90 UG/1
2 AEROSOL, METERED RESPIRATORY (INHALATION) EVERY 6 HOURS PRN
Status: DISCONTINUED | OUTPATIENT
Start: 2022-10-20 | End: 2022-10-21 | Stop reason: HOSPADM

## 2022-10-20 RX ORDER — SODIUM CHLORIDE, SODIUM LACTATE, POTASSIUM CHLORIDE, CALCIUM CHLORIDE 600; 310; 30; 20 MG/100ML; MG/100ML; MG/100ML; MG/100ML
125 INJECTION, SOLUTION INTRAVENOUS CONTINUOUS
Status: DISCONTINUED | OUTPATIENT
Start: 2022-10-20 | End: 2022-10-21 | Stop reason: HOSPADM

## 2022-10-20 RX ORDER — ASPIRIN 81 MG/1
81 TABLET ORAL DAILY
Status: DISCONTINUED | OUTPATIENT
Start: 2022-10-21 | End: 2022-10-21 | Stop reason: HOSPADM

## 2022-10-20 RX ORDER — BUDESONIDE 0.5 MG/2ML
0.5 INHALANT ORAL EVERY 12 HOURS
Status: DISCONTINUED | OUTPATIENT
Start: 2022-10-20 | End: 2022-10-21 | Stop reason: HOSPADM

## 2022-10-20 RX ORDER — TALC
6 POWDER (GRAM) TOPICAL NIGHTLY PRN
Status: DISCONTINUED | OUTPATIENT
Start: 2022-10-20 | End: 2022-10-21 | Stop reason: HOSPADM

## 2022-10-20 RX ORDER — ONDANSETRON 4 MG/1
8 TABLET, ORALLY DISINTEGRATING ORAL EVERY 8 HOURS PRN
Status: DISCONTINUED | OUTPATIENT
Start: 2022-10-20 | End: 2022-10-21 | Stop reason: HOSPADM

## 2022-10-20 RX ORDER — IPRATROPIUM BROMIDE AND ALBUTEROL SULFATE 2.5; .5 MG/3ML; MG/3ML
3 SOLUTION RESPIRATORY (INHALATION)
Status: DISCONTINUED | OUTPATIENT
Start: 2022-10-20 | End: 2022-10-20 | Stop reason: HOSPADM

## 2022-10-20 RX ORDER — FLUTICASONE PROPIONATE 50 MCG
2 SPRAY, SUSPENSION (ML) NASAL DAILY
Status: DISCONTINUED | OUTPATIENT
Start: 2022-10-21 | End: 2022-10-21 | Stop reason: HOSPADM

## 2022-10-20 RX ORDER — LIDOCAINE HYDROCHLORIDE 20 MG/ML
INJECTION, SOLUTION EPIDURAL; INFILTRATION; INTRACAUDAL; PERINEURAL
Status: DISCONTINUED | OUTPATIENT
Start: 2022-10-20 | End: 2022-10-20

## 2022-10-20 RX ORDER — HYDROMORPHONE HYDROCHLORIDE 2 MG/ML
0.5 INJECTION, SOLUTION INTRAMUSCULAR; INTRAVENOUS; SUBCUTANEOUS EVERY 5 MIN PRN
Status: DISCONTINUED | OUTPATIENT
Start: 2022-10-20 | End: 2022-10-20 | Stop reason: HOSPADM

## 2022-10-20 RX ORDER — PROPOFOL 10 MG/ML
VIAL (ML) INTRAVENOUS
Status: DISCONTINUED | OUTPATIENT
Start: 2022-10-20 | End: 2022-10-20

## 2022-10-20 RX ORDER — CEFAZOLIN SODIUM 2 G/50ML
2 SOLUTION INTRAVENOUS
Status: DISCONTINUED | OUTPATIENT
Start: 2022-10-20 | End: 2022-10-20 | Stop reason: HOSPADM

## 2022-10-20 RX ORDER — MORPHINE SULFATE 10 MG/ML
4 INJECTION INTRAMUSCULAR; INTRAVENOUS; SUBCUTANEOUS EVERY 4 HOURS PRN
Status: DISCONTINUED | OUTPATIENT
Start: 2022-10-20 | End: 2022-10-21 | Stop reason: HOSPADM

## 2022-10-20 RX ORDER — SODIUM CHLORIDE 9 MG/ML
INJECTION, SOLUTION INTRAVENOUS CONTINUOUS
Status: DISCONTINUED | OUTPATIENT
Start: 2022-10-20 | End: 2022-10-20

## 2022-10-20 RX ORDER — GLYCOPYRROLATE 0.2 MG/ML
INJECTION INTRAMUSCULAR; INTRAVENOUS
Status: DISCONTINUED | OUTPATIENT
Start: 2022-10-20 | End: 2022-10-20

## 2022-10-20 RX ORDER — AMOXICILLIN 250 MG
1 CAPSULE ORAL 2 TIMES DAILY
Status: DISCONTINUED | OUTPATIENT
Start: 2022-10-20 | End: 2022-10-21 | Stop reason: HOSPADM

## 2022-10-20 RX ORDER — IPRATROPIUM BROMIDE AND ALBUTEROL SULFATE 2.5; .5 MG/3ML; MG/3ML
3 SOLUTION RESPIRATORY (INHALATION) EVERY 6 HOURS
Status: DISCONTINUED | OUTPATIENT
Start: 2022-10-20 | End: 2022-10-21 | Stop reason: HOSPADM

## 2022-10-20 RX ORDER — MORPHINE SULFATE 10 MG/ML
4 INJECTION INTRAMUSCULAR; INTRAVENOUS; SUBCUTANEOUS EVERY 5 MIN PRN
Status: DISCONTINUED | OUTPATIENT
Start: 2022-10-20 | End: 2022-10-20 | Stop reason: HOSPADM

## 2022-10-20 RX ORDER — DULOXETIN HYDROCHLORIDE 30 MG/1
60 CAPSULE, DELAYED RELEASE ORAL DAILY
Status: DISCONTINUED | OUTPATIENT
Start: 2022-10-21 | End: 2022-10-21 | Stop reason: HOSPADM

## 2022-10-20 RX ORDER — HEPARIN SODIUM 1000 [USP'U]/ML
INJECTION, SOLUTION INTRAVENOUS; SUBCUTANEOUS
Status: DISCONTINUED | OUTPATIENT
Start: 2022-10-20 | End: 2022-10-20

## 2022-10-20 RX ORDER — HEPARIN SODIUM 1000 [USP'U]/ML
INJECTION, SOLUTION INTRAVENOUS; SUBCUTANEOUS
Status: DISCONTINUED | OUTPATIENT
Start: 2022-10-20 | End: 2022-10-20 | Stop reason: HOSPADM

## 2022-10-20 RX ORDER — HYDROCODONE BITARTRATE AND ACETAMINOPHEN 5; 325 MG/1; MG/1
1 TABLET ORAL EVERY 4 HOURS PRN
Status: DISCONTINUED | OUTPATIENT
Start: 2022-10-20 | End: 2022-10-21 | Stop reason: HOSPADM

## 2022-10-20 RX ORDER — ONDANSETRON 2 MG/ML
INJECTION INTRAMUSCULAR; INTRAVENOUS
Status: DISCONTINUED | OUTPATIENT
Start: 2022-10-20 | End: 2022-10-20

## 2022-10-20 RX ORDER — SODIUM CHLORIDE 9 MG/ML
INJECTION, SOLUTION INTRAVENOUS CONTINUOUS
Status: DISCONTINUED | OUTPATIENT
Start: 2022-10-20 | End: 2022-10-21 | Stop reason: HOSPADM

## 2022-10-20 RX ORDER — DIPHENHYDRAMINE HYDROCHLORIDE 50 MG/ML
25 INJECTION INTRAMUSCULAR; INTRAVENOUS EVERY 6 HOURS PRN
Status: DISCONTINUED | OUTPATIENT
Start: 2022-10-20 | End: 2022-10-20 | Stop reason: HOSPADM

## 2022-10-20 RX ORDER — MIDAZOLAM HYDROCHLORIDE 1 MG/ML
INJECTION INTRAMUSCULAR; INTRAVENOUS
Status: DISCONTINUED | OUTPATIENT
Start: 2022-10-20 | End: 2022-10-20

## 2022-10-20 RX ORDER — CLOPIDOGREL BISULFATE 75 MG/1
75 TABLET ORAL DAILY
Status: DISCONTINUED | OUTPATIENT
Start: 2022-10-21 | End: 2022-10-21

## 2022-10-20 RX ORDER — PAPAVERINE HYDROCHLORIDE 30 MG/ML
INJECTION INTRAMUSCULAR; INTRAVENOUS
Status: DISCONTINUED | OUTPATIENT
Start: 2022-10-20 | End: 2022-10-20 | Stop reason: HOSPADM

## 2022-10-20 RX ORDER — CEFAZOLIN SODIUM 1 G/3ML
INJECTION, POWDER, FOR SOLUTION INTRAMUSCULAR; INTRAVENOUS
Status: DISCONTINUED | OUTPATIENT
Start: 2022-10-20 | End: 2022-10-20

## 2022-10-20 RX ORDER — ONDANSETRON 2 MG/ML
4 INJECTION INTRAMUSCULAR; INTRAVENOUS DAILY PRN
Status: DISCONTINUED | OUTPATIENT
Start: 2022-10-20 | End: 2022-10-20 | Stop reason: HOSPADM

## 2022-10-20 RX ORDER — OXYCODONE HYDROCHLORIDE 5 MG/1
5 TABLET ORAL
Status: DISCONTINUED | OUTPATIENT
Start: 2022-10-20 | End: 2022-10-20 | Stop reason: HOSPADM

## 2022-10-20 RX ORDER — HYDROCHLOROTHIAZIDE 12.5 MG/1
12.5 TABLET ORAL DAILY
Status: DISCONTINUED | OUTPATIENT
Start: 2022-10-21 | End: 2022-10-21 | Stop reason: HOSPADM

## 2022-10-20 RX ORDER — PROTAMINE SULFATE 10 MG/ML
INJECTION, SOLUTION INTRAVENOUS
Status: DISCONTINUED | OUTPATIENT
Start: 2022-10-20 | End: 2022-10-20

## 2022-10-20 RX ORDER — ACETAMINOPHEN 325 MG/1
650 TABLET ORAL EVERY 4 HOURS PRN
Status: DISCONTINUED | OUTPATIENT
Start: 2022-10-20 | End: 2022-10-21 | Stop reason: HOSPADM

## 2022-10-20 RX ORDER — FENTANYL CITRATE 50 UG/ML
INJECTION, SOLUTION INTRAMUSCULAR; INTRAVENOUS
Status: DISCONTINUED | OUTPATIENT
Start: 2022-10-20 | End: 2022-10-20

## 2022-10-20 RX ORDER — ONDANSETRON 2 MG/ML
4 INJECTION INTRAMUSCULAR; INTRAVENOUS EVERY 8 HOURS PRN
Status: DISCONTINUED | OUTPATIENT
Start: 2022-10-20 | End: 2022-10-21 | Stop reason: HOSPADM

## 2022-10-20 RX ORDER — ACETAMINOPHEN AND CODEINE PHOSPHATE 300; 30 MG/1; MG/1
1 TABLET ORAL EVERY 12 HOURS PRN
Status: DISCONTINUED | OUTPATIENT
Start: 2022-10-20 | End: 2022-10-21 | Stop reason: HOSPADM

## 2022-10-20 RX ADMIN — PROPOFOL 150 MG: 10 INJECTION, EMULSION INTRAVENOUS at 02:10

## 2022-10-20 RX ADMIN — PHENYLEPHRINE HYDROCHLORIDE 100 MCG: 10 INJECTION INTRAVENOUS at 03:10

## 2022-10-20 RX ADMIN — FENTANYL CITRATE 50 MCG: 50 INJECTION INTRAMUSCULAR; INTRAVENOUS at 02:10

## 2022-10-20 RX ADMIN — SODIUM CHLORIDE: 9 INJECTION, SOLUTION INTRAVENOUS at 05:10

## 2022-10-20 RX ADMIN — FENTANYL CITRATE 25 MCG: 50 INJECTION INTRAMUSCULAR; INTRAVENOUS at 03:10

## 2022-10-20 RX ADMIN — ONDANSETRON 8 MG: 2 INJECTION INTRAMUSCULAR; INTRAVENOUS at 03:10

## 2022-10-20 RX ADMIN — MIDAZOLAM HYDROCHLORIDE 2 MG: 1 INJECTION, SOLUTION INTRAMUSCULAR; INTRAVENOUS at 02:10

## 2022-10-20 RX ADMIN — PHENYLEPHRINE HYDROCHLORIDE 100 MCG: 10 INJECTION INTRAVENOUS at 02:10

## 2022-10-20 RX ADMIN — SENNOSIDES AND DOCUSATE SODIUM 1 TABLET: 50; 8.6 TABLET ORAL at 09:10

## 2022-10-20 RX ADMIN — LIDOCAINE HYDROCHLORIDE 50 MG: 20 INJECTION, SOLUTION EPIDURAL; INFILTRATION; INTRACAUDAL; PERINEURAL at 02:10

## 2022-10-20 RX ADMIN — SODIUM CHLORIDE: 9 INJECTION, SOLUTION INTRAVENOUS at 12:10

## 2022-10-20 RX ADMIN — BUDESONIDE 0.5 MG: 0.5 INHALANT ORAL at 08:10

## 2022-10-20 RX ADMIN — IPRATROPIUM BROMIDE AND ALBUTEROL SULFATE 3 ML: 2.5; .5 SOLUTION RESPIRATORY (INHALATION) at 08:10

## 2022-10-20 RX ADMIN — GLYCOPYRROLATE 0.2 MG: 0.2 INJECTION INTRAMUSCULAR; INTRAVENOUS at 03:10

## 2022-10-20 RX ADMIN — CEFAZOLIN 2 G: 1 INJECTION, POWDER, FOR SOLUTION INTRAMUSCULAR; INTRAVENOUS; PARENTERAL at 02:10

## 2022-10-20 RX ADMIN — SODIUM CHLORIDE: 9 INJECTION, SOLUTION INTRAVENOUS at 02:10

## 2022-10-20 RX ADMIN — PROTAMINE SULFATE 20 MG: 10 INJECTION, SOLUTION INTRAVENOUS at 03:10

## 2022-10-20 RX ADMIN — HEPARIN SODIUM 2000 UNITS: 1000 INJECTION INTRAVENOUS; SUBCUTANEOUS at 02:10

## 2022-10-20 RX ADMIN — SODIUM CHLORIDE: 9 INJECTION, SOLUTION INTRAVENOUS at 03:10

## 2022-10-20 RX ADMIN — PROPOFOL 50 MG: 10 INJECTION, EMULSION INTRAVENOUS at 03:10

## 2022-10-20 NOTE — ANESTHESIA PREPROCEDURE EVALUATION
10/20/2022  Dereck Delgado is a 62 y.o., male.      Pre-op Assessment    I have reviewed the Patient Summary Reports.     I have reviewed the Nursing Notes. I have reviewed the NPO Status.   I have reviewed the Medications.     Review of Systems  Anesthesia Hx:  No problems with previous Anesthesia    Social:  No Alcohol Use, Smoker    Hematology/Oncology:  Hematology Normal   Oncology Normal     EENT/Dental:EENT/Dental Normal   Cardiovascular:   Hypertension PVD    Pulmonary:   COPD Shortness of breath    Renal/:  Renal/ Normal     Hepatic/GI:  Hepatic/GI Normal    Musculoskeletal:  Musculoskeletal Normal    Neurological:   CVA    Endocrine:  Endocrine Normal    Dermatological:  Skin Normal    Psych:  Psychiatric Normal           Physical Exam  General: Well nourished    Airway:  Mallampati: III / III  Mouth Opening: Normal  TM Distance: > 6 cm  Tongue: Normal  Neck ROM: Normal ROM    Chest/Lungs:  Clear to auscultation, Normal Respiratory Rate    Heart:  Rate: Normal  Rhythm: Regular Rhythm        Anesthesia Plan  Type of Anesthesia, risks & benefits discussed:    Anesthesia Type: Gen Supraglottic Airway  Intra-op Monitoring Plan: Standard ASA Monitors  Post Op Pain Control Plan: multimodal analgesia  Induction:  IV  Informed Consent: Informed consent signed with the Patient and all parties understand the risks and agree with anesthesia plan.  All questions answered. Patient consented to blood products? Yes  ASA Score: 3  Day of Surgery Review of History & Physical: H&P Update referred to the surgeon/provider.I have interviewed and examined the patient. I have reviewed the patient's H&P dated: There are no significant changes. H&P completed by Anesthesiologist.    Ready For Surgery From Anesthesia Perspective.     .

## 2022-10-20 NOTE — OR NURSING
1612 RECEIVED PT FROM OR. PT RESTING WITH EYES CLOSED. BILATERAL GROIN SITES SOFT.    1630 PT RESTING QUIETLY. AROUSABLE. PT DENIES NEEDS. PT INSTRUCTED TO KEEP LEGS STRAIGHT AND HEAD FLAT ON PILLOW. BILATERAL GROIN SITES SOFT.    1645 PT RESTING QUIETLY. DENIES NEEDS. BILATERAL GROIN SITES SOFT.  DR GOMEZ INFORMED PACU STAFF PT NEEDS ICU BED.  NURSING SUPERVISOR CALLED. NO BEDS AVAILABLE AT THIS TIME.     1700 PT RESTING QUIETLY. DENIES NEEDS, BILATERAL GROIN SITES SOFT    1715 PT RESTING QUIETLY. DENIES NEEDS. BILATERAL GROIN SITES SOFT.  PT DOZING INTERMITTANTLY.     1730 PT RESTING QUIETLY. DENIES NEEDS. BILATERAL GROIN SITES SOFT    1745 PT RESTING QUIETLY.DENIES NEEDS. BILATERAL GROIN SITES SOFT.     1800 PT BROTHER AT BEDSIDE IN PACU. PT RESTING QUIETLY. BILATERAL GROIN SITES SOFT    1805 DR GOMEZ AT BEDSIDE TO TALK WITH FAMILY    1815 ROOM ASSIGNED... SPOKE WITH ICU STAFF, ICU UNABLE TO ACCEPT TRANSFER AT THIS TIME. PT RESTING QUIETLY. BILATERAL GROIN SITES SOFT.  BROTHER LEFT PACU. INSTRUCTED TO TAKE BELONGINGS HOME.    1830 PT RESTING QUIETLY. BILATERAL GROIN SITES SOFT    1845 PT RESTING QUIETLY. DENIES NEEDS. BILATERAL GROIN SITES SOFT.    1900 PT RESTING QUIETLY.. DENIES NEEDS. BILATERAL GROIN SITES SOFT    1910 PT TO ROOM VIA STRETCHER    1915 PT RELEASED TO DANNA KELLER RN  BILATERAL GROIN SITES SOFT.  NO BELONGINGS WITH PT.  173/88, 68, 20,99%(2L NC), 98.0

## 2022-10-20 NOTE — OP NOTE
[facility]  General Surgery  Operative Note    SUMMARY     Date of Procedure: 10/20/2022     Procedure: Procedure(s) (LRB):  Angioplasty-peripheral (Bilateral)   placement 7 x 29 balloon expandable stent right external iliac, bilateral 7 x 50 the distal aortic,  common iliac stents VBX    Surgeon(s) and Role:     * Fernanda Diaz MD - Primary    Assisting Surgeon: None    Patient presents with claudication JADEN 0.6 0.7 CT with significant distal aortoiliac calcifications on-table aortic pressures obtained there is a greater than 20 mmHg     difference from the distal aorta to the right iliac      Pre-Operative Diagnosis: PVD (peripheral vascular disease) [I73.9]  Claudication [I73.9]    Post-Operative Diagnosis: Post-Op Diagnosis Codes:     * PVD (peripheral vascular disease) [I73.9]     * Claudication [I73.9]    Anesthesia: General/MAC    Operative Findings (including complications, if any):  Aortoiliac occlusive disease.  Origin of the external iliac on the right significant disease    Description of Technical Procedures:  Taken operative suite bilateral groins prepped draped preoperative antibiotics given ultrasound-guided access right common femoral artery J-wire placed 5 Croatian sheath.  Advanced wire and distal aorta utilized pigtail catheter and aortic angiogram performed of distal aorta and bilateral iliac systems.  We next used on table papaverine and alert line and hooked it to a RBI catheter and measured pressure gradients from the distal aorta into the right iliac 1st we addressed the external iliac lesion and preserve the hypogastric deployed it to profile this is by 29.  Dex we treated the aortoiliac disease in use bilateral 7 x 59 VBX stents deployed profile completion angiogram showed excellent results patient had been heparinized this was reversed sheath removed pressure held    Significant Surgical Tasks Conducted by the Assistant(s), if Applicable:  None    Estimated  Blood Loss (EBL): * No values recorded between 10/20/2022  2:45 PM and 10/20/2022  4:09 PM *           Implants:   Implant Name Type Inv. Item Serial No.  Lot No. LRB No. Used Action   STENT MARSHALL 7 X 29 X 135CM - [id number] stent peripheral BMS- Balloon Exp STENT MARSHALL 7 X 29 X 135CM  [employer]  Right 1 Implanted   STENT VIABAHN VBX [id number] 135CM - [id number] stent peripheral covered - balloon expanding STENT VIABAHN VBX 7X59MM 135CM  [employer]  Right 1 Implanted   STENT VIABAHN VBX 7X59MM 135CM - [id number] stent peripheral covered - balloon expanding STENT VIABAHN VBX 7X59MM 135CM  [employer]  Left 1 Implanted       Specimens:   Specimen (24h ago, onward)      None                    Condition: Good    Disposition: PACU - hemodynamically stable.    Attestation: I was present and scrubbed for the entire procedure.

## 2022-10-20 NOTE — TRANSFER OF CARE
"Anesthesia Transfer of Care Note    Patient: Dereck Delgado    Procedure(s) Performed: Procedure(s) (LRB):  Angioplasty-peripheral (Bilateral)    Patient location: PACU    Anesthesia Type: general    Transport from OR: Transported from OR on 6-10 L/min O2 by face mask with adequate spontaneous ventilation    Post pain: adequate analgesia    Post assessment: no apparent anesthetic complications    Post vital signs: stable    Level of consciousness: responds to stimulation and awake    Nausea/Vomiting: no nausea/vomiting    Complications: none    Transfer of care protocol was followedComments: Good SV continue, NAD noted, VSS, RTRN      Last vitals:   Visit Vitals  /76 (BP Location: Right arm, Patient Position: Lying)   Pulse 86   Temp 36.3 °C (97.4 °F) (Oral)   Resp 14   Ht 5' 8" (1.727 m)   Wt 42.6 kg (94 lb)   SpO2 99%   BMI 14.29 kg/m²     "

## 2022-10-21 VITALS
DIASTOLIC BLOOD PRESSURE: 66 MMHG | RESPIRATION RATE: 18 BRPM | BODY MASS INDEX: 15.73 KG/M2 | HEIGHT: 68 IN | HEART RATE: 74 BPM | OXYGEN SATURATION: 92 % | TEMPERATURE: 98 F | SYSTOLIC BLOOD PRESSURE: 126 MMHG | WEIGHT: 103.81 LBS

## 2022-10-21 LAB
ANION GAP SERPL CALCULATED.3IONS-SCNC: 12 MMOL/L (ref 7–16)
BUN SERPL-MCNC: 9 MG/DL (ref 7–18)
BUN/CREAT SERPL: 11 (ref 6–20)
CALCIUM SERPL-MCNC: 8.6 MG/DL (ref 8.5–10.1)
CHLORIDE SERPL-SCNC: 105 MMOL/L (ref 98–107)
CO2 SERPL-SCNC: 29 MMOL/L (ref 21–32)
CREAT SERPL-MCNC: 0.82 MG/DL (ref 0.7–1.3)
EGFR (NO RACE VARIABLE) (RUSH/TITUS): 99 ML/MIN/1.73M²
GLUCOSE SERPL-MCNC: 82 MG/DL (ref 74–106)
HCT VFR BLD AUTO: 43.6 % (ref 40–54)
HGB BLD-MCNC: 14.5 G/DL (ref 13.5–18)
POTASSIUM SERPL-SCNC: 3.8 MMOL/L (ref 3.5–5.1)
SODIUM SERPL-SCNC: 142 MMOL/L (ref 136–145)

## 2022-10-21 PROCEDURE — 25000003 PHARM REV CODE 250: Performed by: HOSPITALIST

## 2022-10-21 PROCEDURE — 94640 AIRWAY INHALATION TREATMENT: CPT | Mod: XB

## 2022-10-21 PROCEDURE — 80048 BASIC METABOLIC PNL TOTAL CA: CPT | Performed by: NURSE PRACTITIONER

## 2022-10-21 PROCEDURE — 36415 COLL VENOUS BLD VENIPUNCTURE: CPT | Performed by: NURSE PRACTITIONER

## 2022-10-21 PROCEDURE — 27000221 HC OXYGEN, UP TO 24 HOURS

## 2022-10-21 PROCEDURE — 25000242 PHARM REV CODE 250 ALT 637 W/ HCPCS: Performed by: NURSE PRACTITIONER

## 2022-10-21 PROCEDURE — 85014 HEMATOCRIT: CPT | Performed by: NURSE PRACTITIONER

## 2022-10-21 PROCEDURE — 99204 OFFICE O/P NEW MOD 45 MIN: CPT | Mod: ,,, | Performed by: INTERNAL MEDICINE

## 2022-10-21 PROCEDURE — 25000003 PHARM REV CODE 250: Performed by: NURSE PRACTITIONER

## 2022-10-21 PROCEDURE — 94761 N-INVAS EAR/PLS OXIMETRY MLT: CPT

## 2022-10-21 PROCEDURE — 25000003 PHARM REV CODE 250: Performed by: SURGERY

## 2022-10-21 PROCEDURE — 85018 HEMOGLOBIN: CPT | Performed by: NURSE PRACTITIONER

## 2022-10-21 PROCEDURE — 99204 PR OFFICE/OUTPT VISIT, NEW, LEVL IV, 45-59 MIN: ICD-10-PCS | Mod: ,,, | Performed by: INTERNAL MEDICINE

## 2022-10-21 RX ORDER — SODIUM CHLORIDE 9 MG/ML
INJECTION, SOLUTION INTRAVENOUS CONTINUOUS
Status: DISCONTINUED | OUTPATIENT
Start: 2022-10-21 | End: 2022-10-21 | Stop reason: HOSPADM

## 2022-10-21 RX ORDER — CLOPIDOGREL BISULFATE 75 MG/1
75 TABLET ORAL DAILY
Status: DISCONTINUED | OUTPATIENT
Start: 2022-10-21 | End: 2022-10-21 | Stop reason: HOSPADM

## 2022-10-21 RX ORDER — CALCIUM CARBONATE 200(500)MG
1000 TABLET,CHEWABLE ORAL 3 TIMES DAILY PRN
Status: DISCONTINUED | OUTPATIENT
Start: 2022-10-21 | End: 2022-10-21 | Stop reason: HOSPADM

## 2022-10-21 RX ORDER — CLOPIDOGREL BISULFATE 75 MG/1
75 TABLET ORAL DAILY
Qty: 30 TABLET | Refills: 11 | Status: SHIPPED | OUTPATIENT
Start: 2022-10-21 | End: 2022-12-23 | Stop reason: SDUPTHER

## 2022-10-21 RX ADMIN — IPRATROPIUM BROMIDE AND ALBUTEROL SULFATE 3 ML: 2.5; .5 SOLUTION RESPIRATORY (INHALATION) at 12:10

## 2022-10-21 RX ADMIN — HYDROCHLOROTHIAZIDE 12.5 MG: 12.5 TABLET ORAL at 09:10

## 2022-10-21 RX ADMIN — ASPIRIN 81 MG: 81 TABLET, COATED ORAL at 09:10

## 2022-10-21 RX ADMIN — DULOXETINE HYDROCHLORIDE 60 MG: 30 CAPSULE, DELAYED RELEASE ORAL at 09:10

## 2022-10-21 RX ADMIN — IPRATROPIUM BROMIDE AND ALBUTEROL SULFATE 3 ML: 2.5; .5 SOLUTION RESPIRATORY (INHALATION) at 07:10

## 2022-10-21 RX ADMIN — SODIUM CHLORIDE: 9 INJECTION, SOLUTION INTRAVENOUS at 02:10

## 2022-10-21 RX ADMIN — CLOPIDOGREL 75 MG: 75 TABLET, FILM COATED ORAL at 09:10

## 2022-10-21 RX ADMIN — SENNOSIDES AND DOCUSATE SODIUM 1 TABLET: 50; 8.6 TABLET ORAL at 09:10

## 2022-10-21 RX ADMIN — BUDESONIDE 0.5 MG: 0.5 INHALANT ORAL at 07:10

## 2022-10-21 RX ADMIN — CALCIUM CARBONATE (ANTACID) CHEW TAB 500 MG 1000 MG: 500 CHEW TAB at 12:10

## 2022-10-21 NOTE — ANESTHESIA POSTPROCEDURE EVALUATION
Anesthesia Post Evaluation    Patient: Dereck Delgado    Procedure(s) Performed: Procedure(s) (LRB):  Angioplasty-peripheral (Bilateral)    Final Anesthesia Type: general      Patient location during evaluation: PACU  Patient participation: Yes- Able to Participate  Level of consciousness: awake and alert and oriented  Post-procedure vital signs: reviewed and stable  Pain management: adequate  Airway patency: patent  CAROLYN mitigation strategies: Multimodal analgesia  PONV status at discharge: No PONV  Anesthetic complications: no      Cardiovascular status: hemodynamically stable  Respiratory status: unassisted and spontaneous ventilation  Hydration status: euvolemic  Follow-up not needed.          Vitals Value Taken Time   /63 10/21/22 0601   Temp 37.1 °C (98.7 °F) 10/21/22 0332   Pulse 68 10/21/22 0611   Resp 16 10/21/22 0611   SpO2 94 % 10/21/22 0611   Vitals shown include unvalidated device data.      Event Time   Out of Recovery 19:10:00         Pain/Gulshan Score: Gulshan Score: 8 (10/20/2022  7:00 PM)

## 2022-10-21 NOTE — DISCHARGE SUMMARY
[facility]  General Surgery  Discharge Summary      Patient Name: Dereck Delgado  MRN: [id number]  Admission Date: 10/20/2022  Hospital Length of Stay: 0 days  Discharge Date and Time:  10/21/2022 8:31 AM  Attending Physician: Fernanda Diaz MD   Discharging Provider: Fernanda Diaz MD  Primary Care Provider: Jose Manuel Castro MD    HPI:   No notes on file    Procedure(s) (LRB):  Angioplasty-peripheral (Bilateral)      Indwelling Lines/Drains at time of discharge:   Lines/Drains/Airways     None               Hospital Course: Underwent bilateral iliac stents  Now with palpable pulses  Groins fine  Lab good   home      Goals of Care Treatment Preferences:  Code Status: Full Code      Consults:     Significant Diagnostic Studies: Labs:   BMP:   Recent Labs   Lab 10/21/22  0710   GLU 82      K 3.8      CO2 29   BUN 9   CREATININE 0.82   CALCIUM 8.6    and CBC   Recent Labs   Lab 10/21/22  0547   HGB 14.5   HCT 43.6       Pending Diagnostic Studies:     None        Final Active Diagnoses:    Diagnosis Date Noted POA    PRINCIPAL PROBLEM:  PVD (peripheral vascular disease) [I73.9] 10/20/2022 Yes    Essential hypertension [I10] 07/22/2022 Yes    Chronic obstructive pulmonary disease [J44.9] 06/28/2021 Yes      Problems Resolved During this Admission:      Discharged Condition: good    Disposition: Home or Self Care    Follow Up:    Patient Instructions:      Diet general     Medications:  Reconciled Home Medications:      Medication List      START taking these medications    clopidogreL 75 mg tablet  Commonly known as: PLAVIX  Take 1 tablet (75 mg total) by mouth once daily.        CONTINUE taking these medications    acetaminophen-codeine 300-30mg 300-30 mg Tab  Commonly known as: TYLENOL #3  Take 1 tablet by mouth every 12 (twelve) hours as needed (pain).     aspirin 81 MG EC tablet  Commonly known as: ECOTRIN  Take 1 tablet (81 mg total) by mouth once daily.      budesonide-glycopyr-formoterol 160-9-4.8 mcg/actuation Hfaa  Inhale 2 puffs into the lungs 2 (two) times daily.     DULoxetine 60 MG capsule  Commonly known as: CYMBALTA  Take 1 capsule (60 mg total) by mouth once daily.     fluticasone propionate 50 mcg/actuation nasal spray  Commonly known as: FLONASE  2 SPRAYS IN EACH NOSTRIL DAILY     hydroCHLOROthiazide 12.5 MG Tab  Commonly known as: HYDRODIURIL  TAKE 1 TABLET BY MOUTH EVERY DAY IN THE MORNING     VENTOLIN HFA 90 mcg/actuation inhaler  Generic drug: albuterol  INHALE 2 PUFFS INTO THE LUNGS EVERY 6 (SIX) HOURS AS NEEDED FOR WHEEZING. RESCUE          Time spent on the discharge of patient: 15 minutes    Fernanda Diaz MD  General Surgery  [facility]

## 2022-10-21 NOTE — DISCHARGE SUMMARY
[facility]  Discharge Note  Short Stay    Procedure(s) (LRB):  Angioplasty-peripheral (Bilateral)      OUTCOME: Patient tolerated treatment/procedure well without complication and is now ready for discharge.    DISPOSITION: Home or Self Care    FINAL DIAGNOSIS:  PVD (peripheral vascular disease)    FOLLOWUP: In clinic    DISCHARGE INSTRUCTIONS:    Discharge Procedure Orders   Diet general        TIME SPENT ON DISCHARGE: 10 minutes

## 2022-10-21 NOTE — PLAN OF CARE
Problem: Adult Inpatient Plan of Care  Goal: Plan of Care Review  Outcome: Ongoing, Progressing  Goal: Patient-Specific Goal (Individualized)  Outcome: Ongoing, Progressing  Goal: Absence of Hospital-Acquired Illness or Injury  Outcome: Ongoing, Progressing  Goal: Optimal Comfort and Wellbeing  Outcome: Ongoing, Progressing  Goal: Readiness for Transition of Care  Outcome: Ongoing, Progressing     Problem: Infection  Goal: Absence of Infection Signs and Symptoms  Outcome: Ongoing, Progressing     Problem: Fall Injury Risk  Goal: Absence of Fall and Fall-Related Injury  Outcome: Ongoing, Progressing     Problem: Gas Exchange Impaired  Goal: Optimal Gas Exchange  Outcome: Ongoing, Progressing

## 2022-10-21 NOTE — SUBJECTIVE & OBJECTIVE
Interval History:  Patient without complaints    Objective:     Vital Signs (Most Recent):  Temp: 98.1 °F (36.7 °C) (10/21/22 0719)  Pulse: 71 (10/21/22 0645)  Resp: 16 (10/21/22 0645)  BP: 130/64 (10/21/22 0645)  SpO2: (!) 89 % (10/21/22 0645)   Vital Signs (24h Range):  Temp:  [97.4 °F (36.3 °C)-98.7 °F (37.1 °C)] 98.1 °F (36.7 °C)  Pulse:  [61-88] 71  Resp:  [7-21] 16  SpO2:  [89 %-100 %] 89 %  BP: ()/(41-90) 130/64     Weight: 47.1 kg (103 lb 13.4 oz)  Body mass index is 15.79 kg/m².      Intake/Output Summary (Last 24 hours) at 10/21/2022 0751  Last data filed at 10/21/2022 0600  Gross per 24 hour   Intake 1905 ml   Output 690 ml   Net 1215 ml       Physical Exam  Vitals reviewed.   Constitutional:       Appearance: Normal appearance.      Interventions: He is not intubated.  HENT:      Head: Normocephalic and atraumatic.      Nose: Nose normal.      Mouth/Throat:      Mouth: Mucous membranes are dry.      Pharynx: Oropharynx is clear.   Eyes:      Extraocular Movements: Extraocular movements intact.      Conjunctiva/sclera: Conjunctivae normal.      Pupils: Pupils are equal, round, and reactive to light.   Cardiovascular:      Rate and Rhythm: Normal rate.      Heart sounds: Normal heart sounds. No murmur heard.  Pulmonary:      Effort: Pulmonary effort is normal. He is not intubated.      Breath sounds: Normal breath sounds.   Abdominal:      General: Abdomen is flat. Bowel sounds are normal.      Palpations: Abdomen is soft.   Musculoskeletal:         General: Normal range of motion.      Cervical back: Normal range of motion and neck supple.      Right lower leg: No edema.      Left lower leg: No edema.   Skin:     General: Skin is warm and dry.      Capillary Refill: Capillary refill takes less than 2 seconds.   Neurological:      General: No focal deficit present.      Mental Status: He is alert and oriented to person, place, and time.   Psychiatric:         Mood and Affect: Mood normal.          Behavior: Behavior normal.       Vents:  Oxygen Concentration (%): 24 (10/21/22 0009)    Lines/Drains/Airways       Peripheral Intravenous Line  Duration                  Peripheral IV - Single Lumen 10/20/22 1139 22 G Left Antecubital <1 day         Peripheral IV - Single Lumen 10/20/22 1915 20 G Anterior;Proximal;Right Forearm <1 day                    Significant Labs:    CBC/Anemia Profile:  Recent Labs   Lab 10/21/22  0547   HGB 14.5   HCT 43.6        Chemistries:  No results for input(s): NA, K, CL, CO2, BUN, CREATININE, CALCIUM, ALBUMIN, PROT, BILITOT, ALKPHOS, ALT, AST, GLUCOSE, MG, PHOS in the last 48 hours.    Recent Lab Results         10/21/22  0547   10/20/22  1015        Group & Rh   A POS       Hematocrit 43.6         Hemoglobin 14.5         INDIRECT RAMY   NEG               Significant Imaging:  I have reviewed all pertinent imaging results/findings within the past 24 hours.

## 2022-10-21 NOTE — NURSING
0720 pt resting quietly. Denies pain in groin or legs. Pulses palpable and both feet warm. Await dr ragland.  0735 dr ragland here. Update given. Will plan d/c home today. Pt c/o some indigestion. He has appointment with dr cleaning this month for same  0925: discharge instructions reviewed including meds and activity. Pt verbalized understanding. Iv's d/c'd without problem  0930 pt dressed and discharged via w/c to POV.

## 2022-10-21 NOTE — PROGRESS NOTES
[facility]  Pulmonology  Progress Note    Patient Name: Dereck Delgado  MRN: [id number]  Admission Date: 10/20/2022  Hospital Length of Stay: 0 days  Code Status: Full Code  Attending Provider: Fernanda Diaz MD  Primary Care Provider: Jose Manuel Castro MD   Principal Problem: PVD (peripheral vascular disease)    Subjective:     Interval History:  Patient without complaints    Objective:     Vital Signs (Most Recent):  Temp: 98.1 °F (36.7 °C) (10/21/22 0719)  Pulse: 71 (10/21/22 0645)  Resp: 16 (10/21/22 0645)  BP: 130/64 (10/21/22 0645)  SpO2: (!) 89 % (10/21/22 0645)   Vital Signs (24h Range):  Temp:  [97.4 °F (36.3 °C)-98.7 °F (37.1 °C)] 98.1 °F (36.7 °C)  Pulse:  [61-88] 71  Resp:  [7-21] 16  SpO2:  [89 %-100 %] 89 %  BP: ()/(41-90) 130/64     Weight: 47.1 kg (103 lb 13.4 oz)  Body mass index is 15.79 kg/m².      Intake/Output Summary (Last 24 hours) at 10/21/2022 0751  Last data filed at 10/21/2022 0600  Gross per 24 hour   Intake 1905 ml   Output 690 ml   Net 1215 ml       Physical Exam  Vitals reviewed.   Constitutional:       Appearance: Normal appearance.      Interventions: He is not intubated.  HENT:      Head: Normocephalic and atraumatic.      Nose: Nose normal.      Mouth/Throat:      Mouth: Mucous membranes are dry.      Pharynx: Oropharynx is clear.   Eyes:      Extraocular Movements: Extraocular movements intact.      Conjunctiva/sclera: Conjunctivae normal.      Pupils: Pupils are equal, round, and reactive to light.   Cardiovascular:      Rate and Rhythm: Normal rate.      Heart sounds: Normal heart sounds. No murmur heard.  Pulmonary:      Effort: Pulmonary effort is normal. He is not intubated.      Breath sounds: Normal breath sounds.   Abdominal:      General: Abdomen is flat. Bowel sounds are normal.      Palpations: Abdomen is soft.   Musculoskeletal:         General: Normal range of motion.      Cervical back: Normal range of motion and neck supple.       Right lower leg: No edema.      Left lower leg: No edema.   Skin:     General: Skin is warm and dry.      Capillary Refill: Capillary refill takes less than 2 seconds.   Neurological:      General: No focal deficit present.      Mental Status: He is alert and oriented to person, place, and time.   Psychiatric:         Mood and Affect: Mood normal.         Behavior: Behavior normal.       Vents:  Oxygen Concentration (%): 24 (10/21/22 0009)    Lines/Drains/Airways       Peripheral Intravenous Line  Duration                  Peripheral IV - Single Lumen 10/20/22 1139 22 G Left Antecubital <1 day         Peripheral IV - Single Lumen 10/20/22 1915 20 G Anterior;Proximal;Right Forearm <1 day                    Significant Labs:    CBC/Anemia Profile:  Recent Labs   Lab 10/21/22  0547   HGB 14.5   HCT 43.6        Chemistries:  No results for input(s): NA, K, CL, CO2, BUN, CREATININE, CALCIUM, ALBUMIN, PROT, BILITOT, ALKPHOS, ALT, AST, GLUCOSE, MG, PHOS in the last 48 hours.    Recent Lab Results         10/21/22  0547   10/20/22  1015        Group & Rh   A POS       Hematocrit 43.6         Hemoglobin 14.5         INDIRECT RAMY   NEG               Significant Imaging:  I have reviewed all pertinent imaging results/findings within the past 24 hours.    Assessment/Plan:     * PVD (peripheral vascular disease)  Status post surgery doing well    Essential hypertension  Currently under good control    Chronic obstructive pulmonary disease  Stable                 Shan Kim MD  Pulmonology  [facility]

## 2022-10-26 ENCOUNTER — OFFICE VISIT (OUTPATIENT)
Dept: GASTROENTEROLOGY | Facility: CLINIC | Age: 62
End: 2022-10-26
Payer: MEDICAID

## 2022-10-26 VITALS — OXYGEN SATURATION: 99 % | DIASTOLIC BLOOD PRESSURE: 80 MMHG | HEART RATE: 96 BPM | SYSTOLIC BLOOD PRESSURE: 139 MMHG

## 2022-10-26 DIAGNOSIS — R74.8 ELEVATED LIVER ENZYMES: Primary | ICD-10-CM

## 2022-10-26 PROCEDURE — 3075F PR MOST RECENT SYSTOLIC BLOOD PRESS GE 130-139MM HG: ICD-10-PCS | Mod: CPTII,,, | Performed by: STUDENT IN AN ORGANIZED HEALTH CARE EDUCATION/TRAINING PROGRAM

## 2022-10-26 PROCEDURE — 99213 PR OFFICE/OUTPT VISIT, EST, LEVL III, 20-29 MIN: ICD-10-PCS | Mod: ,,, | Performed by: STUDENT IN AN ORGANIZED HEALTH CARE EDUCATION/TRAINING PROGRAM

## 2022-10-26 PROCEDURE — 3075F SYST BP GE 130 - 139MM HG: CPT | Mod: CPTII,,, | Performed by: STUDENT IN AN ORGANIZED HEALTH CARE EDUCATION/TRAINING PROGRAM

## 2022-10-26 PROCEDURE — 3079F DIAST BP 80-89 MM HG: CPT | Mod: CPTII,,, | Performed by: STUDENT IN AN ORGANIZED HEALTH CARE EDUCATION/TRAINING PROGRAM

## 2022-10-26 PROCEDURE — 3079F PR MOST RECENT DIASTOLIC BLOOD PRESSURE 80-89 MM HG: ICD-10-PCS | Mod: CPTII,,, | Performed by: STUDENT IN AN ORGANIZED HEALTH CARE EDUCATION/TRAINING PROGRAM

## 2022-10-26 PROCEDURE — 99213 OFFICE O/P EST LOW 20 MIN: CPT | Mod: ,,, | Performed by: STUDENT IN AN ORGANIZED HEALTH CARE EDUCATION/TRAINING PROGRAM

## 2022-10-26 NOTE — PROGRESS NOTES
[facility] Note    Reason for clinic visit: No chief complaint on file.      History of Present Illness:  Dereck Delgado is a 62 y.o. male that  has a past medical history of COPD (chronic obstructive pulmonary disease), CVA (cerebral vascular accident), and Depression. .    Referred for direct hyperbilirubinemia.  States he has been drinking 3 drinks a day since age 13. Denies history of cirrhosis.  Denies IVDA, blood transfusion, but does admit to unprofessional tattoo on LUE.    Has Bms every day.  Denies diarrhea or abdominal pain. Denies n/v.     Follows with cardiology for shortness of breath with referral to pulm after EKG and nuclear stress test. Vascular surgery with angioplasty and stent placement of right external iliac artery.    History of PVD and CVA 2018, on Plavix, ASA.  Labs show Hgb 14.5, MCV 99, Plat wnl.   UDS showing THC positive.  CTAP 7/2022 wnl.    States he had negative FOBT per patient in the past year per patient.    Review of Systems:  12 point review of systems otherwise negative except as stated in HPI.    Objective:  There were no vitals filed for this visit.  Physical Exam  Constitutional:       Appearance: Normal appearance.   HENT:      Head: Normocephalic and atraumatic.      Nose: No congestion or rhinorrhea.      Mouth/Throat:      Mouth: Mucous membranes are moist.      Pharynx: Oropharynx is clear.   Eyes:      General: No scleral icterus.     Pupils: Pupils are equal, round, and reactive to light.   Cardiovascular:      Rate and Rhythm: Normal rate and regular rhythm.   Pulmonary:      Effort: Pulmonary effort is normal.      Breath sounds: Normal breath sounds.   Abdominal:      General: Abdomen is flat.      Palpations: Abdomen is soft.   Musculoskeletal:         General: No swelling or tenderness.      Cervical back: Neck supple. No rigidity.   Skin:     General: Skin is warm and dry.   Neurological:      General: No focal deficit present.      Mental  Status: He is alert and oriented to person, place, and time.   Psychiatric:         Mood and Affect: Mood normal.         Behavior: Behavior normal.       Assessment and Plan:  Direct Hyperbilirubinemia  - will obtain acute hepatitis studies; does have unprofessional tattoo  - repeat direct Bili in 6 months with AbdUS and elastography    RTC 6 months    Jean Winslow  Gastroenterology

## 2022-11-02 ENCOUNTER — OFFICE VISIT (OUTPATIENT)
Dept: SURGERY | Facility: CLINIC | Age: 62
End: 2022-11-02
Payer: MEDICAID

## 2022-11-02 DIAGNOSIS — I73.9 PVD (PERIPHERAL VASCULAR DISEASE): Primary | ICD-10-CM

## 2022-11-02 PROCEDURE — 99213 OFFICE O/P EST LOW 20 MIN: CPT | Mod: PBBFAC | Performed by: SURGERY

## 2022-11-02 PROCEDURE — 1159F MED LIST DOCD IN RCRD: CPT | Mod: CPTII,,, | Performed by: SURGERY

## 2022-11-02 PROCEDURE — 1160F PR REVIEW ALL MEDS BY PRESCRIBER/CLIN PHARMACIST DOCUMENTED: ICD-10-PCS | Mod: CPTII,,, | Performed by: SURGERY

## 2022-11-02 PROCEDURE — 1160F RVW MEDS BY RX/DR IN RCRD: CPT | Mod: CPTII,,, | Performed by: SURGERY

## 2022-11-02 PROCEDURE — 99212 OFFICE O/P EST SF 10 MIN: CPT | Mod: S$PBB,,, | Performed by: SURGERY

## 2022-11-02 PROCEDURE — 99212 PR OFFICE/OUTPT VISIT, EST, LEVL II, 10-19 MIN: ICD-10-PCS | Mod: S$PBB,,, | Performed by: SURGERY

## 2022-11-02 PROCEDURE — 1159F PR MEDICATION LIST DOCUMENTED IN MEDICAL RECORD: ICD-10-PCS | Mod: CPTII,,, | Performed by: SURGERY

## 2022-11-02 NOTE — PROGRESS NOTES
Vascular clinic    Follow-up bilateral iliacs stents of the distal aorta and bilateral iliacs, and right external iliac    States he feels some better , groins without abnormalities    Continue aspirin     Follow-up 6 months with ABIs     Stop smoking

## 2022-12-14 ENCOUNTER — OFFICE VISIT (OUTPATIENT)
Dept: PULMONOLOGY | Facility: CLINIC | Age: 62
End: 2022-12-14
Payer: MEDICAID

## 2022-12-14 VITALS
HEART RATE: 89 BPM | BODY MASS INDEX: 16.17 KG/M2 | WEIGHT: 103 LBS | RESPIRATION RATE: 16 BRPM | SYSTOLIC BLOOD PRESSURE: 153 MMHG | DIASTOLIC BLOOD PRESSURE: 68 MMHG | OXYGEN SATURATION: 95 % | HEIGHT: 67 IN

## 2022-12-14 DIAGNOSIS — J44.9 CHRONIC OBSTRUCTIVE PULMONARY DISEASE, UNSPECIFIED COPD TYPE: ICD-10-CM

## 2022-12-14 DIAGNOSIS — R06.02 SOB (SHORTNESS OF BREATH): ICD-10-CM

## 2022-12-14 PROCEDURE — 3078F DIAST BP <80 MM HG: CPT | Mod: CPTII,,, | Performed by: INTERNAL MEDICINE

## 2022-12-14 PROCEDURE — 3078F PR MOST RECENT DIASTOLIC BLOOD PRESSURE < 80 MM HG: ICD-10-PCS | Mod: CPTII,,, | Performed by: INTERNAL MEDICINE

## 2022-12-14 PROCEDURE — 3008F PR BODY MASS INDEX (BMI) DOCUMENTED: ICD-10-PCS | Mod: CPTII,,, | Performed by: INTERNAL MEDICINE

## 2022-12-14 PROCEDURE — 99213 PR OFFICE/OUTPT VISIT, EST, LEVL III, 20-29 MIN: ICD-10-PCS | Mod: S$PBB,,, | Performed by: INTERNAL MEDICINE

## 2022-12-14 PROCEDURE — 3008F BODY MASS INDEX DOCD: CPT | Mod: CPTII,,, | Performed by: INTERNAL MEDICINE

## 2022-12-14 PROCEDURE — 1159F PR MEDICATION LIST DOCUMENTED IN MEDICAL RECORD: ICD-10-PCS | Mod: CPTII,,, | Performed by: INTERNAL MEDICINE

## 2022-12-14 PROCEDURE — 3077F PR MOST RECENT SYSTOLIC BLOOD PRESSURE >= 140 MM HG: ICD-10-PCS | Mod: CPTII,,, | Performed by: INTERNAL MEDICINE

## 2022-12-14 PROCEDURE — 3077F SYST BP >= 140 MM HG: CPT | Mod: CPTII,,, | Performed by: INTERNAL MEDICINE

## 2022-12-14 PROCEDURE — 1159F MED LIST DOCD IN RCRD: CPT | Mod: CPTII,,, | Performed by: INTERNAL MEDICINE

## 2022-12-14 PROCEDURE — 99215 OFFICE O/P EST HI 40 MIN: CPT | Mod: PBBFAC | Performed by: INTERNAL MEDICINE

## 2022-12-14 PROCEDURE — 99213 OFFICE O/P EST LOW 20 MIN: CPT | Mod: S$PBB,,, | Performed by: INTERNAL MEDICINE

## 2022-12-14 NOTE — PROGRESS NOTES
Subjective:       Patient ID: Dereck Delgado is a 62 y.o. male.    Chief Complaint: Shortness of Breath and COPD    Shortness of Breath  This is a chronic problem. The current episode started more than 1 month ago. The problem has been unchanged. Pertinent negatives include no abdominal pain, chest pain, ear pain, headaches or rash. The symptoms are aggravated by exercise. His past medical history is significant for COPD.   COPD  Pertinent negatives include no abdominal pain, arthralgias, chest pain, chills, congestion, headaches or rash.   Past Medical History:   Diagnosis Date    COPD (chronic obstructive pulmonary disease)     CVA (cerebral vascular accident)     Depression      Past Surgical History:   Procedure Laterality Date    EYE SURGERY       Family History   Problem Relation Age of Onset    Cancer Mother     Cancer Maternal Aunt     Heart disease Maternal Uncle     Birth defects Maternal Grandmother      Review of patient's allergies indicates:  Not on File   Social History     Tobacco Use    Smoking status: Every Day     Packs/day: 0.75     Years: 53.00     Pack years: 39.75     Types: Cigarettes    Smokeless tobacco: Never   Substance Use Topics    Alcohol use: Yes    Drug use: Yes     Types: Marijuana      Review of Systems   Constitutional:  Negative for chills, activity change and night sweats.   HENT:  Negative for congestion and ear pain.    Eyes:  Negative for redness and itching.   Respiratory:  Positive for shortness of breath.    Cardiovascular:  Negative for chest pain and palpitations.   Musculoskeletal:  Negative for arthralgias and back pain.   Skin:  Negative for rash.   Gastrointestinal:  Negative for abdominal pain and abdominal distention.   Neurological:  Negative for dizziness and headaches.   Hematological:  Negative for adenopathy. Does not bruise/bleed easily.   Psychiatric/Behavioral:  Negative for confusion. The patient is not nervous/anxious.      Objective:      Physical  Exam   Constitutional: He is oriented to person, place, and time. He appears well-developed and well-nourished.   HENT:   Head: Normocephalic.   Nose: Nose normal.   Mouth/Throat: Oropharynx is clear and moist.   Neck: No JVD present. No thyromegaly present.   Cardiovascular: Normal rate, regular rhythm, normal heart sounds and intact distal pulses.   Pulmonary/Chest: Normal expansion, hyperinflation, symmetric chest wall expansion, effort normal and breath sounds normal.   Abdominal: Soft. Bowel sounds are normal.   Musculoskeletal:         General: Normal range of motion.      Cervical back: Normal range of motion and neck supple.   Lymphadenopathy: No supraclavicular adenopathy is present.     He has no cervical adenopathy.   Neurological: He is alert and oriented to person, place, and time. He has normal reflexes.   Skin: Skin is warm and dry.   Psychiatric: He has a normal mood and affect. His behavior is normal.   Personal Diagnostic Review  none pertinent    No flowsheet data found.      Assessment:       1. SOB (shortness of breath)    2. Chronic obstructive pulmonary disease, unspecified COPD type          Outpatient Encounter Medications as of 12/14/2022   Medication Sig Dispense Refill    aspirin (ECOTRIN) 81 MG EC tablet Take 1 tablet (81 mg total) by mouth once daily. 90 tablet 1    budesonide-glycopyr-formoterol 160-9-4.8 mcg/actuation HFAA Inhale 2 puffs into the lungs 2 (two) times daily. 10.7 g 5    clopidogreL (PLAVIX) 75 mg tablet Take 1 tablet (75 mg total) by mouth once daily. 30 tablet 11    DULoxetine (CYMBALTA) 60 MG capsule Take 1 capsule (60 mg total) by mouth once daily. 90 capsule 1    fluticasone propionate (FLONASE) 50 mcg/actuation nasal spray 2 SPRAYS IN EACH NOSTRIL DAILY 16 mL 5    hydroCHLOROthiazide (HYDRODIURIL) 12.5 MG Tab TAKE 1 TABLET BY MOUTH EVERY DAY IN THE MORNING 30 tablet 5    VENTOLIN HFA 90 mcg/actuation inhaler INHALE 2 PUFFS INTO THE LUNGS EVERY 6 (SIX) HOURS AS  NEEDED FOR WHEEZING. RESCUE 18 g 5     No facility-administered encounter medications on file as of 12/14/2022.     No orders of the defined types were placed in this encounter.      Plan:       Problem List Items Addressed This Visit          Pulmonary    Chronic obstructive pulmonary disease     Patient with COPD he continues to smoke as the 1st thing he needs to do to improve his outcome he is on a triple inhaler as well as Ventolin and there is really only other medicine would be a phosphodiesterase inhibitor and I feel uncomfortable starting now while he is on Plavix.  His pulmonary functions are still stage II the only other thing to consider would be pulmonary rehab he does not seem interested in that today         SOB (shortness of breath)

## 2022-12-14 NOTE — ASSESSMENT & PLAN NOTE
Patient with COPD he continues to smoke as the 1st thing he needs to do to improve his outcome he is on a triple inhaler as well as Ventolin and there is really only other medicine would be a phosphodiesterase inhibitor and I feel uncomfortable starting now while he is on Plavix.  His pulmonary functions are still stage II the only other thing to consider would be pulmonary rehab he does not seem interested in that today

## 2022-12-23 ENCOUNTER — APPOINTMENT (OUTPATIENT)
Dept: RADIOLOGY | Facility: CLINIC | Age: 62
End: 2022-12-23
Attending: FAMILY MEDICINE
Payer: MEDICAID

## 2022-12-23 ENCOUNTER — OFFICE VISIT (OUTPATIENT)
Dept: FAMILY MEDICINE | Facility: CLINIC | Age: 62
End: 2022-12-23
Payer: MEDICAID

## 2022-12-23 VITALS
SYSTOLIC BLOOD PRESSURE: 158 MMHG | HEART RATE: 64 BPM | TEMPERATURE: 97 F | DIASTOLIC BLOOD PRESSURE: 82 MMHG | OXYGEN SATURATION: 94 %

## 2022-12-23 DIAGNOSIS — J44.9 CHRONIC OBSTRUCTIVE PULMONARY DISEASE, UNSPECIFIED COPD TYPE: ICD-10-CM

## 2022-12-23 DIAGNOSIS — J30.9 ALLERGIC RHINITIS, UNSPECIFIED SEASONALITY, UNSPECIFIED TRIGGER: ICD-10-CM

## 2022-12-23 DIAGNOSIS — J44.1 COPD EXACERBATION: Primary | ICD-10-CM

## 2022-12-23 DIAGNOSIS — I10 ESSENTIAL HYPERTENSION: ICD-10-CM

## 2022-12-23 DIAGNOSIS — I73.9 PVD (PERIPHERAL VASCULAR DISEASE): ICD-10-CM

## 2022-12-23 DIAGNOSIS — F32.A DEPRESSION, UNSPECIFIED DEPRESSION TYPE: ICD-10-CM

## 2022-12-23 PROCEDURE — 3077F SYST BP >= 140 MM HG: CPT | Mod: CPTII,,, | Performed by: FAMILY MEDICINE

## 2022-12-23 PROCEDURE — 1159F PR MEDICATION LIST DOCUMENTED IN MEDICAL RECORD: ICD-10-PCS | Mod: CPTII,,, | Performed by: FAMILY MEDICINE

## 2022-12-23 PROCEDURE — 99213 PR OFFICE/OUTPT VISIT, EST, LEVL III, 20-29 MIN: ICD-10-PCS | Mod: 25,,, | Performed by: FAMILY MEDICINE

## 2022-12-23 PROCEDURE — 96372 THER/PROPH/DIAG INJ SC/IM: CPT | Mod: ,,, | Performed by: FAMILY MEDICINE

## 2022-12-23 PROCEDURE — 99213 OFFICE O/P EST LOW 20 MIN: CPT | Mod: 25,,, | Performed by: FAMILY MEDICINE

## 2022-12-23 PROCEDURE — 3077F PR MOST RECENT SYSTOLIC BLOOD PRESSURE >= 140 MM HG: ICD-10-PCS | Mod: CPTII,,, | Performed by: FAMILY MEDICINE

## 2022-12-23 PROCEDURE — 3079F PR MOST RECENT DIASTOLIC BLOOD PRESSURE 80-89 MM HG: ICD-10-PCS | Mod: CPTII,,, | Performed by: FAMILY MEDICINE

## 2022-12-23 PROCEDURE — 1159F MED LIST DOCD IN RCRD: CPT | Mod: CPTII,,, | Performed by: FAMILY MEDICINE

## 2022-12-23 PROCEDURE — 71046 X-RAY EXAM CHEST 2 VIEWS: CPT | Mod: TC,RHCUB | Performed by: FAMILY MEDICINE

## 2022-12-23 PROCEDURE — 71046 XR CHEST PA AND LATERAL: ICD-10-PCS | Mod: 26,,, | Performed by: RADIOLOGY

## 2022-12-23 PROCEDURE — 3079F DIAST BP 80-89 MM HG: CPT | Mod: CPTII,,, | Performed by: FAMILY MEDICINE

## 2022-12-23 PROCEDURE — 1160F PR REVIEW ALL MEDS BY PRESCRIBER/CLIN PHARMACIST DOCUMENTED: ICD-10-PCS | Mod: CPTII,,, | Performed by: FAMILY MEDICINE

## 2022-12-23 PROCEDURE — 96372 PR INJECTION,THERAP/PROPH/DIAG2ST, IM OR SUBCUT: ICD-10-PCS | Mod: ,,, | Performed by: FAMILY MEDICINE

## 2022-12-23 PROCEDURE — 1160F RVW MEDS BY RX/DR IN RCRD: CPT | Mod: CPTII,,, | Performed by: FAMILY MEDICINE

## 2022-12-23 PROCEDURE — 71046 X-RAY EXAM CHEST 2 VIEWS: CPT | Mod: 26,,, | Performed by: RADIOLOGY

## 2022-12-23 RX ORDER — FLUTICASONE PROPIONATE 50 MCG
2 SPRAY, SUSPENSION (ML) NASAL DAILY
Qty: 16 ML | Refills: 5 | Status: SHIPPED | OUTPATIENT
Start: 2022-12-23 | End: 2023-02-07 | Stop reason: HOSPADM

## 2022-12-23 RX ORDER — HYDROCHLOROTHIAZIDE 12.5 MG/1
12.5 TABLET ORAL DAILY
Qty: 30 TABLET | Refills: 5 | Status: SHIPPED | OUTPATIENT
Start: 2022-12-23 | End: 2023-01-01

## 2022-12-23 RX ORDER — ALBUTEROL SULFATE 90 UG/1
2 AEROSOL, METERED RESPIRATORY (INHALATION) EVERY 6 HOURS PRN
Qty: 18 G | Refills: 5 | Status: SHIPPED | OUTPATIENT
Start: 2022-12-23 | End: 2023-01-01 | Stop reason: SDUPTHER

## 2022-12-23 RX ORDER — BETAMETHASONE SODIUM PHOSPHATE AND BETAMETHASONE ACETATE 3; 3 MG/ML; MG/ML
6 INJECTION, SUSPENSION INTRA-ARTICULAR; INTRALESIONAL; INTRAMUSCULAR; SOFT TISSUE
Status: COMPLETED | OUTPATIENT
Start: 2022-12-23 | End: 2022-12-23

## 2022-12-23 RX ORDER — CLOPIDOGREL BISULFATE 75 MG/1
75 TABLET ORAL DAILY
Qty: 30 TABLET | Refills: 11 | Status: SHIPPED | OUTPATIENT
Start: 2022-12-23 | End: 2024-01-01

## 2022-12-23 RX ORDER — DULOXETIN HYDROCHLORIDE 60 MG/1
60 CAPSULE, DELAYED RELEASE ORAL DAILY
Qty: 90 CAPSULE | Refills: 1 | Status: SHIPPED | OUTPATIENT
Start: 2022-12-23 | End: 2023-01-01 | Stop reason: SDUPTHER

## 2022-12-23 RX ORDER — CEFUROXIME AXETIL 500 MG/1
500 TABLET ORAL EVERY 12 HOURS
Qty: 20 TABLET | Refills: 0 | Status: SHIPPED | OUTPATIENT
Start: 2022-12-23 | End: 2023-02-07 | Stop reason: ALTCHOICE

## 2022-12-23 RX ORDER — CEFTRIAXONE 1 G/1
1 INJECTION, POWDER, FOR SOLUTION INTRAMUSCULAR; INTRAVENOUS
Status: COMPLETED | OUTPATIENT
Start: 2022-12-23 | End: 2022-12-23

## 2022-12-23 RX ADMIN — CEFTRIAXONE 1 G: 1 INJECTION, POWDER, FOR SOLUTION INTRAMUSCULAR; INTRAVENOUS at 02:12

## 2022-12-23 RX ADMIN — BETAMETHASONE SODIUM PHOSPHATE AND BETAMETHASONE ACETATE 6 MG: 3; 3 INJECTION, SUSPENSION INTRA-ARTICULAR; INTRALESIONAL; INTRAMUSCULAR; SOFT TISSUE at 02:12

## 2022-12-23 NOTE — PROGRESS NOTES
Dereck Delgado is a 62 y.o. male seen today for persistent chest congestion and cough occasionally productive of mild blood and sputum since a recent diagnosis of flu.  Patient so far has been afebrile with no nausea or vomiting.    Past Medical History:   Diagnosis Date    COPD (chronic obstructive pulmonary disease)     CVA (cerebral vascular accident)     Depression      Family History   Problem Relation Age of Onset    Cancer Mother     Cancer Maternal Aunt     Heart disease Maternal Uncle     Birth defects Maternal Grandmother      Current Outpatient Medications on File Prior to Visit   Medication Sig Dispense Refill    aspirin (ECOTRIN) 81 MG EC tablet Take 1 tablet (81 mg total) by mouth once daily. 90 tablet 1    [DISCONTINUED] budesonide-glycopyr-formoterol 160-9-4.8 mcg/actuation HFAA Inhale 2 puffs into the lungs 2 (two) times daily. 10.7 g 5    [DISCONTINUED] clopidogreL (PLAVIX) 75 mg tablet Take 1 tablet (75 mg total) by mouth once daily. 30 tablet 11    [DISCONTINUED] DULoxetine (CYMBALTA) 60 MG capsule Take 1 capsule (60 mg total) by mouth once daily. 90 capsule 1    [DISCONTINUED] fluticasone propionate (FLONASE) 50 mcg/actuation nasal spray 2 SPRAYS IN EACH NOSTRIL DAILY 16 mL 5    [DISCONTINUED] hydroCHLOROthiazide (HYDRODIURIL) 12.5 MG Tab TAKE 1 TABLET BY MOUTH EVERY DAY IN THE MORNING 30 tablet 5    [DISCONTINUED] VENTOLIN HFA 90 mcg/actuation inhaler INHALE 2 PUFFS INTO THE LUNGS EVERY 6 (SIX) HOURS AS NEEDED FOR WHEEZING. RESCUE 18 g 5     No current facility-administered medications on file prior to visit.     Immunization History   Administered Date(s) Administered    COVID-19, MRNA, LN-S, PF (MODERNA FULL 0.5 ML DOSE) 03/09/2021, 04/06/2021, 01/19/2022       Review of Systems   Constitutional:  Positive for malaise/fatigue. Negative for fever and weight loss.   Respiratory:  Positive for cough, hemoptysis, sputum production, shortness of breath and wheezing.    Cardiovascular:  Negative  for chest pain and palpitations.   Gastrointestinal:  Negative for nausea and vomiting.   Psychiatric/Behavioral:  Negative for depression.       There were no vitals filed for this visit.    Physical Exam  Vitals reviewed.   Constitutional:       Appearance: Normal appearance.   HENT:      Head: Normocephalic.      Nose:      Right Turbinates: Swollen.      Left Turbinates: Swollen.      Comments: Patient has thick postnasal drainage  Eyes:      Extraocular Movements: Extraocular movements intact.      Conjunctiva/sclera: Conjunctivae normal.      Pupils: Pupils are equal, round, and reactive to light.   Neck:      Thyroid: No thyroid mass or thyromegaly.   Cardiovascular:      Rate and Rhythm: Normal rate and regular rhythm.      Heart sounds: Normal heart sounds. No murmur heard.    No gallop.   Pulmonary:      Effort: Pulmonary effort is normal. No respiratory distress.      Breath sounds: Decreased air movement present. Decreased breath sounds, wheezing and rhonchi present. No rales.   Skin:     General: Skin is warm and dry.      Coloration: Skin is not jaundiced or pale.   Neurological:      Mental Status: He is alert.   Psychiatric:         Mood and Affect: Mood normal.         Behavior: Behavior normal.         Thought Content: Thought content normal.         Judgment: Judgment normal.        Assessment and Plan  Allergic rhinitis, unspecified seasonality, unspecified trigger  -     fluticasone propionate (FLONASE) 50 mcg/actuation nasal spray; 2 sprays (100 mcg total) by Each Nostril route once daily.  Dispense: 16 mL; Refill: 5    Depression, unspecified depression type  -     DULoxetine (CYMBALTA) 60 MG capsule; Take 1 capsule (60 mg total) by mouth once daily.  Dispense: 90 capsule; Refill: 1    Chronic obstructive pulmonary disease, unspecified COPD type  -     albuterol (VENTOLIN HFA) 90 mcg/actuation inhaler; Inhale 2 puffs into the lungs every 6 (six) hours as needed for Wheezing. Rescue  Dispense:  18 g; Refill: 5  -     budesonide-glycopyr-formoterol 160-9-4.8 mcg/actuation HFAA; Inhale 2 puffs into the lungs 2 (two) times daily.  Dispense: 10.7 g; Refill: 5    Essential hypertension  -     hydroCHLOROthiazide (HYDRODIURIL) 12.5 MG Tab; Take 1 tablet (12.5 mg total) by mouth once daily.  Dispense: 30 tablet; Refill: 5    PVD (peripheral vascular disease)  -     clopidogreL (PLAVIX) 75 mg tablet; Take 1 tablet (75 mg total) by mouth once daily.  Dispense: 30 tablet; Refill: 11            Return to clinic in 3 months or as needed if symptoms worsen or persist.  I recommended Mucinex plain 600 mg and increase intake of fluids as well as regular use of his inhalers.    Health Maintenance Topics with due status: Not Due       Topic Last Completion Date    [facility] Lung Screen 07/13/2022

## 2022-12-28 ENCOUNTER — TELEPHONE (OUTPATIENT)
Dept: FAMILY MEDICINE | Facility: CLINIC | Age: 62
End: 2022-12-28
Payer: MEDICAID

## 2022-12-28 DIAGNOSIS — F17.200 SMOKER: Primary | ICD-10-CM

## 2022-12-28 NOTE — TELEPHONE ENCOUNTER
----- Message from Jose Manuel Castro MD sent at 12/23/2022  2:35 PM CST -----  No infiltrate but severe COPD.  Radiology did recommend a low-dose CT scan of the patient.

## 2023-01-01 ENCOUNTER — OFFICE VISIT (OUTPATIENT)
Dept: PULMONOLOGY | Facility: CLINIC | Age: 63
End: 2023-01-01
Payer: MEDICAID

## 2023-01-01 ENCOUNTER — OFFICE VISIT (OUTPATIENT)
Dept: FAMILY MEDICINE | Facility: CLINIC | Age: 63
End: 2023-01-01
Payer: MEDICAID

## 2023-01-01 ENCOUNTER — HOSPITAL ENCOUNTER (OUTPATIENT)
Dept: RADIOLOGY | Facility: HOSPITAL | Age: 63
Discharge: HOME | End: 2023-04-20
Attending: FAMILY MEDICINE
Payer: MEDICAID

## 2023-01-01 ENCOUNTER — HOSPITAL ENCOUNTER (OUTPATIENT)
Dept: RADIOLOGY | Facility: HOSPITAL | Age: 63
Discharge: HOME | End: 2023-05-15
Attending: SURGERY
Payer: MEDICAID

## 2023-01-01 ENCOUNTER — OFFICE VISIT (OUTPATIENT)
Dept: GASTROENTEROLOGY | Facility: CLINIC | Age: 63
End: 2023-01-01
Payer: MEDICAID

## 2023-01-01 ENCOUNTER — OFFICE VISIT (OUTPATIENT)
Dept: SURGERY | Facility: CLINIC | Age: 63
End: 2023-01-01
Payer: MEDICAID

## 2023-01-01 ENCOUNTER — OFFICE VISIT (OUTPATIENT)
Dept: CARDIOLOGY | Facility: CLINIC | Age: 63
End: 2023-01-01
Payer: MEDICAID

## 2023-01-01 ENCOUNTER — HOSPITAL ENCOUNTER (OUTPATIENT)
Dept: RADIOLOGY | Facility: HOSPITAL | Age: 63
Discharge: HOME | End: 2023-05-15
Payer: MEDICAID

## 2023-01-01 ENCOUNTER — TELEPHONE (OUTPATIENT)
Dept: FAMILY MEDICINE | Facility: CLINIC | Age: 63
End: 2023-01-01
Payer: MEDICAID

## 2023-01-01 ENCOUNTER — TELEPHONE (OUTPATIENT)
Dept: CARDIOLOGY | Facility: CLINIC | Age: 63
End: 2023-01-01
Payer: MEDICAID

## 2023-01-01 VITALS
BODY MASS INDEX: 14.28 KG/M2 | DIASTOLIC BLOOD PRESSURE: 80 MMHG | RESPIRATION RATE: 18 BRPM | OXYGEN SATURATION: 93 % | HEART RATE: 83 BPM | SYSTOLIC BLOOD PRESSURE: 148 MMHG | HEIGHT: 67 IN | WEIGHT: 91 LBS

## 2023-01-01 VITALS — HEART RATE: 81 BPM | WEIGHT: 90 LBS | HEIGHT: 67 IN | TEMPERATURE: 97 F | BODY MASS INDEX: 14.12 KG/M2

## 2023-01-01 VITALS
TEMPERATURE: 98 F | RESPIRATION RATE: 18 BRPM | OXYGEN SATURATION: 96 % | DIASTOLIC BLOOD PRESSURE: 82 MMHG | WEIGHT: 89 LBS | BODY MASS INDEX: 13.97 KG/M2 | HEART RATE: 78 BPM | HEIGHT: 67 IN | SYSTOLIC BLOOD PRESSURE: 138 MMHG

## 2023-01-01 VITALS
DIASTOLIC BLOOD PRESSURE: 78 MMHG | HEIGHT: 67 IN | HEART RATE: 80 BPM | BODY MASS INDEX: 14.75 KG/M2 | TEMPERATURE: 98 F | RESPIRATION RATE: 18 BRPM | OXYGEN SATURATION: 93 % | WEIGHT: 94 LBS | SYSTOLIC BLOOD PRESSURE: 122 MMHG

## 2023-01-01 VITALS
HEIGHT: 67 IN | SYSTOLIC BLOOD PRESSURE: 142 MMHG | BODY MASS INDEX: 13.97 KG/M2 | HEART RATE: 81 BPM | OXYGEN SATURATION: 95 % | RESPIRATION RATE: 14 BRPM | DIASTOLIC BLOOD PRESSURE: 86 MMHG | WEIGHT: 89 LBS

## 2023-01-01 VITALS — OXYGEN SATURATION: 99 % | TEMPERATURE: 98 F | HEART RATE: 110 BPM

## 2023-01-01 VITALS
WEIGHT: 84 LBS | OXYGEN SATURATION: 97 % | HEART RATE: 91 BPM | SYSTOLIC BLOOD PRESSURE: 151 MMHG | DIASTOLIC BLOOD PRESSURE: 72 MMHG | RESPIRATION RATE: 18 BRPM | HEIGHT: 67 IN | BODY MASS INDEX: 13.18 KG/M2

## 2023-01-01 VITALS
OXYGEN SATURATION: 99 % | BODY MASS INDEX: 14.12 KG/M2 | HEART RATE: 72 BPM | SYSTOLIC BLOOD PRESSURE: 130 MMHG | DIASTOLIC BLOOD PRESSURE: 90 MMHG | WEIGHT: 90 LBS | HEIGHT: 67 IN

## 2023-01-01 VITALS
SYSTOLIC BLOOD PRESSURE: 154 MMHG | WEIGHT: 91.81 LBS | OXYGEN SATURATION: 95 % | HEIGHT: 67 IN | BODY MASS INDEX: 14.41 KG/M2 | DIASTOLIC BLOOD PRESSURE: 80 MMHG | HEART RATE: 79 BPM

## 2023-01-01 DIAGNOSIS — R91.1 SOLITARY PULMONARY NODULE: ICD-10-CM

## 2023-01-01 DIAGNOSIS — R74.8 ELEVATED LIVER ENZYMES: Primary | ICD-10-CM

## 2023-01-01 DIAGNOSIS — I10 ESSENTIAL HYPERTENSION: ICD-10-CM

## 2023-01-01 DIAGNOSIS — K92.1 HEMATOCHEZIA: ICD-10-CM

## 2023-01-01 DIAGNOSIS — I73.9 CLAUDICATION: ICD-10-CM

## 2023-01-01 DIAGNOSIS — J44.9 CHRONIC OBSTRUCTIVE PULMONARY DISEASE, UNSPECIFIED COPD TYPE: ICD-10-CM

## 2023-01-01 DIAGNOSIS — Z72.0 TOBACCO ABUSE DISORDER: ICD-10-CM

## 2023-01-01 DIAGNOSIS — I73.9 PVD (PERIPHERAL VASCULAR DISEASE): ICD-10-CM

## 2023-01-01 DIAGNOSIS — R07.9 CHEST PAIN, UNSPECIFIED TYPE: ICD-10-CM

## 2023-01-01 DIAGNOSIS — I73.9 PVD (PERIPHERAL VASCULAR DISEASE): Primary | ICD-10-CM

## 2023-01-01 DIAGNOSIS — R07.9 CHEST PAIN, UNSPECIFIED TYPE: Primary | ICD-10-CM

## 2023-01-01 DIAGNOSIS — Z13.6 ENCOUNTER FOR SCREENING FOR CARDIOVASCULAR DISORDERS: Primary | ICD-10-CM

## 2023-01-01 DIAGNOSIS — Z12.2 SCREENING FOR LUNG CANCER: ICD-10-CM

## 2023-01-01 DIAGNOSIS — F32.A DEPRESSION, UNSPECIFIED DEPRESSION TYPE: ICD-10-CM

## 2023-01-01 DIAGNOSIS — R94.31 ABNORMAL ECG: ICD-10-CM

## 2023-01-01 DIAGNOSIS — F17.200 SMOKING: Primary | ICD-10-CM

## 2023-01-01 DIAGNOSIS — R74.8 ELEVATED LIVER ENZYMES: ICD-10-CM

## 2023-01-01 LAB
EKG 12-LEAD: NORMAL
PR INTERVAL: NORMAL
PRT AXES: NORMAL
QRS DURATION: NORMAL
QT/QTC: NORMAL
VENTRICULAR RATE: NORMAL

## 2023-01-01 PROCEDURE — 99214 PR OFFICE/OUTPT VISIT, EST, LEVL IV, 30-39 MIN: ICD-10-PCS | Mod: S$PBB,,, | Performed by: INTERNAL MEDICINE

## 2023-01-01 PROCEDURE — 99214 OFFICE O/P EST MOD 30 MIN: CPT | Mod: S$PBB,,, | Performed by: SURGERY

## 2023-01-01 PROCEDURE — 99212 OFFICE O/P EST SF 10 MIN: CPT | Mod: 25,,, | Performed by: FAMILY MEDICINE

## 2023-01-01 PROCEDURE — 99212 OFFICE O/P EST SF 10 MIN: CPT | Mod: ,,, | Performed by: FAMILY MEDICINE

## 2023-01-01 PROCEDURE — 93010 EKG 12-LEAD: ICD-10-PCS | Mod: S$PBB,,, | Performed by: INTERNAL MEDICINE

## 2023-01-01 PROCEDURE — 1159F MED LIST DOCD IN RCRD: CPT | Mod: CPTII,,, | Performed by: INTERNAL MEDICINE

## 2023-01-01 PROCEDURE — 99214 OFFICE O/P EST MOD 30 MIN: CPT | Mod: PBBFAC | Performed by: INTERNAL MEDICINE

## 2023-01-01 PROCEDURE — 93010 ELECTROCARDIOGRAM REPORT: CPT | Mod: S$PBB,,, | Performed by: INTERNAL MEDICINE

## 2023-01-01 PROCEDURE — 99214 OFFICE O/P EST MOD 30 MIN: CPT | Mod: 25,S$PBB,, | Performed by: INTERNAL MEDICINE

## 2023-01-01 PROCEDURE — 1159F PR MEDICATION LIST DOCUMENTED IN MEDICAL RECORD: ICD-10-PCS | Mod: CPTII,,,

## 2023-01-01 PROCEDURE — 99214 PR OFFICE/OUTPT VISIT, EST, LEVL IV, 30-39 MIN: ICD-10-PCS | Mod: S$PBB,,, | Performed by: SURGERY

## 2023-01-01 PROCEDURE — 3079F DIAST BP 80-89 MM HG: CPT | Mod: CPTII,,, | Performed by: FAMILY MEDICINE

## 2023-01-01 PROCEDURE — 93000 ELECTROCARDIOGRAM COMPLETE: CPT | Mod: ,,, | Performed by: FAMILY MEDICINE

## 2023-01-01 PROCEDURE — 76705 ECHO EXAM OF ABDOMEN: CPT | Mod: 26,59,, | Performed by: RADIOLOGY

## 2023-01-01 PROCEDURE — 3077F SYST BP >= 140 MM HG: CPT | Mod: CPTII,,, | Performed by: INTERNAL MEDICINE

## 2023-01-01 PROCEDURE — 1160F PR REVIEW ALL MEDS BY PRESCRIBER/CLIN PHARMACIST DOCUMENTED: ICD-10-PCS | Mod: CPTII,,, | Performed by: SURGERY

## 2023-01-01 PROCEDURE — 3008F BODY MASS INDEX DOCD: CPT | Mod: CPTII,,,

## 2023-01-01 PROCEDURE — 1160F PR REVIEW ALL MEDS BY PRESCRIBER/CLIN PHARMACIST DOCUMENTED: ICD-10-PCS | Mod: CPTII,,, | Performed by: FAMILY MEDICINE

## 2023-01-01 PROCEDURE — 3075F PR MOST RECENT SYSTOLIC BLOOD PRESS GE 130-139MM HG: ICD-10-PCS | Mod: CPTII,,, | Performed by: FAMILY MEDICINE

## 2023-01-01 PROCEDURE — 93924 US ARTERIAL DOPPLER W/ EXERCISE: ICD-10-PCS | Mod: 26,,, | Performed by: SURGERY

## 2023-01-01 PROCEDURE — 1159F MED LIST DOCD IN RCRD: CPT | Mod: CPTII,,,

## 2023-01-01 PROCEDURE — 3077F PR MOST RECENT SYSTOLIC BLOOD PRESSURE >= 140 MM HG: ICD-10-PCS | Mod: CPTII,,, | Performed by: INTERNAL MEDICINE

## 2023-01-01 PROCEDURE — 3008F BODY MASS INDEX DOCD: CPT | Mod: CPTII,,, | Performed by: FAMILY MEDICINE

## 2023-01-01 PROCEDURE — 99214 OFFICE O/P EST MOD 30 MIN: CPT | Mod: PBBFAC,25 | Performed by: INTERNAL MEDICINE

## 2023-01-01 PROCEDURE — 1160F RVW MEDS BY RX/DR IN RCRD: CPT | Mod: CPTII,,, | Performed by: FAMILY MEDICINE

## 2023-01-01 PROCEDURE — 3008F PR BODY MASS INDEX (BMI) DOCUMENTED: ICD-10-PCS | Mod: CPTII,,, | Performed by: SURGERY

## 2023-01-01 PROCEDURE — 3008F PR BODY MASS INDEX (BMI) DOCUMENTED: ICD-10-PCS | Mod: CPTII,,, | Performed by: INTERNAL MEDICINE

## 2023-01-01 PROCEDURE — 99213 OFFICE O/P EST LOW 20 MIN: CPT | Mod: ,,, | Performed by: FAMILY MEDICINE

## 2023-01-01 PROCEDURE — 1160F RVW MEDS BY RX/DR IN RCRD: CPT | Mod: CPTII,,, | Performed by: SURGERY

## 2023-01-01 PROCEDURE — 71270 CT THORAX DX C-/C+: CPT | Mod: TC

## 2023-01-01 PROCEDURE — 71270 CT THORAX DX C-/C+: CPT | Mod: 26,,, | Performed by: RADIOLOGY

## 2023-01-01 PROCEDURE — 99213 PR OFFICE/OUTPT VISIT, EST, LEVL III, 20-29 MIN: ICD-10-PCS | Mod: S$PBB,,,

## 2023-01-01 PROCEDURE — 3078F PR MOST RECENT DIASTOLIC BLOOD PRESSURE < 80 MM HG: ICD-10-PCS | Mod: CPTII,,, | Performed by: INTERNAL MEDICINE

## 2023-01-01 PROCEDURE — 3078F DIAST BP <80 MM HG: CPT | Mod: CPTII,,, | Performed by: INTERNAL MEDICINE

## 2023-01-01 PROCEDURE — 99214 OFFICE O/P EST MOD 30 MIN: CPT | Mod: S$PBB,,, | Performed by: INTERNAL MEDICINE

## 2023-01-01 PROCEDURE — 76981 USE PARENCHYMA: CPT | Mod: TC,59

## 2023-01-01 PROCEDURE — 99212 OFFICE O/P EST SF 10 MIN: CPT | Mod: S$PBB,,, | Performed by: SURGERY

## 2023-01-01 PROCEDURE — 93925 LOWER EXTREMITY STUDY: CPT | Mod: TC

## 2023-01-01 PROCEDURE — 1159F PR MEDICATION LIST DOCUMENTED IN MEDICAL RECORD: ICD-10-PCS | Mod: CPTII,,, | Performed by: SURGERY

## 2023-01-01 PROCEDURE — 99213 OFFICE O/P EST LOW 20 MIN: CPT | Mod: PBBFAC | Performed by: SURGERY

## 2023-01-01 PROCEDURE — 99212 PR OFFICE/OUTPT VISIT, EST, LEVL II, 10-19 MIN: ICD-10-PCS | Mod: ,,, | Performed by: FAMILY MEDICINE

## 2023-01-01 PROCEDURE — 1159F PR MEDICATION LIST DOCUMENTED IN MEDICAL RECORD: ICD-10-PCS | Mod: CPTII,,, | Performed by: FAMILY MEDICINE

## 2023-01-01 PROCEDURE — 93925 LOWER EXTREMITY STUDY: CPT | Mod: 26,,, | Performed by: SURGERY

## 2023-01-01 PROCEDURE — 1159F PR MEDICATION LIST DOCUMENTED IN MEDICAL RECORD: ICD-10-PCS | Mod: CPTII,,, | Performed by: INTERNAL MEDICINE

## 2023-01-01 PROCEDURE — 3077F SYST BP >= 140 MM HG: CPT | Mod: CPTII,,,

## 2023-01-01 PROCEDURE — 93924 LWR XTR VASC STDY BILAT: CPT | Mod: 26,,, | Performed by: SURGERY

## 2023-01-01 PROCEDURE — 25500020 PHARM REV CODE 255: Performed by: FAMILY MEDICINE

## 2023-01-01 PROCEDURE — 3077F PR MOST RECENT SYSTOLIC BLOOD PRESSURE >= 140 MM HG: ICD-10-PCS | Mod: CPTII,,,

## 2023-01-01 PROCEDURE — 99212 PR OFFICE/OUTPT VISIT, EST, LEVL II, 10-19 MIN: ICD-10-PCS | Mod: S$PBB,,, | Performed by: SURGERY

## 2023-01-01 PROCEDURE — 3079F DIAST BP 80-89 MM HG: CPT | Mod: CPTII,,, | Performed by: INTERNAL MEDICINE

## 2023-01-01 PROCEDURE — 3079F PR MOST RECENT DIASTOLIC BLOOD PRESSURE 80-89 MM HG: ICD-10-PCS | Mod: CPTII,,, | Performed by: INTERNAL MEDICINE

## 2023-01-01 PROCEDURE — 1160F PR REVIEW ALL MEDS BY PRESCRIBER/CLIN PHARMACIST DOCUMENTED: ICD-10-PCS | Mod: CPTII,,,

## 2023-01-01 PROCEDURE — 76981 USE PARENCHYMA: CPT | Mod: 26,,, | Performed by: RADIOLOGY

## 2023-01-01 PROCEDURE — 1159F MED LIST DOCD IN RCRD: CPT | Mod: CPTII,,, | Performed by: SURGERY

## 2023-01-01 PROCEDURE — 3079F DIAST BP 80-89 MM HG: CPT | Mod: CPTII,,,

## 2023-01-01 PROCEDURE — 3008F BODY MASS INDEX DOCD: CPT | Mod: CPTII,,, | Performed by: INTERNAL MEDICINE

## 2023-01-01 PROCEDURE — 3075F PR MOST RECENT SYSTOLIC BLOOD PRESS GE 130-139MM HG: ICD-10-PCS | Mod: CPTII,,, | Performed by: INTERNAL MEDICINE

## 2023-01-01 PROCEDURE — 1160F RVW MEDS BY RX/DR IN RCRD: CPT | Mod: CPTII,,,

## 2023-01-01 PROCEDURE — 3080F PR MOST RECENT DIASTOLIC BLOOD PRESSURE >= 90 MM HG: ICD-10-PCS | Mod: CPTII,,, | Performed by: INTERNAL MEDICINE

## 2023-01-01 PROCEDURE — 1159F MED LIST DOCD IN RCRD: CPT | Mod: CPTII,,, | Performed by: FAMILY MEDICINE

## 2023-01-01 PROCEDURE — 99214 PR OFFICE/OUTPT VISIT, EST, LEVL IV, 30-39 MIN: ICD-10-PCS | Mod: 25,S$PBB,, | Performed by: INTERNAL MEDICINE

## 2023-01-01 PROCEDURE — 99212 PR OFFICE/OUTPT VISIT, EST, LEVL II, 10-19 MIN: ICD-10-PCS | Mod: 25,,, | Performed by: FAMILY MEDICINE

## 2023-01-01 PROCEDURE — 1160F PR REVIEW ALL MEDS BY PRESCRIBER/CLIN PHARMACIST DOCUMENTED: ICD-10-PCS | Mod: CPTII,,, | Performed by: INTERNAL MEDICINE

## 2023-01-01 PROCEDURE — 76705 ECHO EXAM OF ABDOMEN: CPT | Mod: TC

## 2023-01-01 PROCEDURE — 3077F SYST BP >= 140 MM HG: CPT | Mod: CPTII,,, | Performed by: FAMILY MEDICINE

## 2023-01-01 PROCEDURE — 3079F PR MOST RECENT DIASTOLIC BLOOD PRESSURE 80-89 MM HG: ICD-10-PCS | Mod: CPTII,,,

## 2023-01-01 PROCEDURE — 3008F PR BODY MASS INDEX (BMI) DOCUMENTED: ICD-10-PCS | Mod: CPTII,,,

## 2023-01-01 PROCEDURE — 99213 OFFICE O/P EST LOW 20 MIN: CPT | Mod: S$PBB,,,

## 2023-01-01 PROCEDURE — 3079F PR MOST RECENT DIASTOLIC BLOOD PRESSURE 80-89 MM HG: ICD-10-PCS | Mod: CPTII,,, | Performed by: FAMILY MEDICINE

## 2023-01-01 PROCEDURE — 3008F PR BODY MASS INDEX (BMI) DOCUMENTED: ICD-10-PCS | Mod: CPTII,,, | Performed by: FAMILY MEDICINE

## 2023-01-01 PROCEDURE — 76981 US ELASTOGRAPHY PARENCHYMA (ORGAN): ICD-10-PCS | Mod: 26,,, | Performed by: RADIOLOGY

## 2023-01-01 PROCEDURE — 93005 ELECTROCARDIOGRAM TRACING: CPT | Mod: PBBFAC | Performed by: INTERNAL MEDICINE

## 2023-01-01 PROCEDURE — 3077F PR MOST RECENT SYSTOLIC BLOOD PRESSURE >= 140 MM HG: ICD-10-PCS | Mod: CPTII,,, | Performed by: FAMILY MEDICINE

## 2023-01-01 PROCEDURE — 3078F DIAST BP <80 MM HG: CPT | Mod: CPTII,,, | Performed by: FAMILY MEDICINE

## 2023-01-01 PROCEDURE — 3074F PR MOST RECENT SYSTOLIC BLOOD PRESSURE < 130 MM HG: ICD-10-PCS | Mod: CPTII,,, | Performed by: FAMILY MEDICINE

## 2023-01-01 PROCEDURE — 3080F DIAST BP >= 90 MM HG: CPT | Mod: CPTII,,, | Performed by: INTERNAL MEDICINE

## 2023-01-01 PROCEDURE — 1160F RVW MEDS BY RX/DR IN RCRD: CPT | Mod: CPTII,,, | Performed by: INTERNAL MEDICINE

## 2023-01-01 PROCEDURE — 3075F SYST BP GE 130 - 139MM HG: CPT | Mod: CPTII,,, | Performed by: FAMILY MEDICINE

## 2023-01-01 PROCEDURE — 3075F SYST BP GE 130 - 139MM HG: CPT | Mod: CPTII,,, | Performed by: INTERNAL MEDICINE

## 2023-01-01 PROCEDURE — 3074F SYST BP LT 130 MM HG: CPT | Mod: CPTII,,, | Performed by: FAMILY MEDICINE

## 2023-01-01 PROCEDURE — 93000 POCT EKG 12-LEAD: ICD-10-PCS | Mod: ,,, | Performed by: FAMILY MEDICINE

## 2023-01-01 PROCEDURE — 3008F BODY MASS INDEX DOCD: CPT | Mod: CPTII,,, | Performed by: SURGERY

## 2023-01-01 PROCEDURE — 93924 LWR XTR VASC STDY BILAT: CPT | Mod: TC

## 2023-01-01 PROCEDURE — 76705 US ABDOMEN LIMITED_LIVER: ICD-10-PCS | Mod: 26,59,, | Performed by: RADIOLOGY

## 2023-01-01 PROCEDURE — 93925 US LOWER EXTREMITY ARTERIES BILATERAL: ICD-10-PCS | Mod: 26,,, | Performed by: SURGERY

## 2023-01-01 PROCEDURE — 99213 PR OFFICE/OUTPT VISIT, EST, LEVL III, 20-29 MIN: ICD-10-PCS | Mod: ,,, | Performed by: FAMILY MEDICINE

## 2023-01-01 PROCEDURE — 3078F PR MOST RECENT DIASTOLIC BLOOD PRESSURE < 80 MM HG: ICD-10-PCS | Mod: CPTII,,, | Performed by: FAMILY MEDICINE

## 2023-01-01 PROCEDURE — 71270 CT CHEST W WO CONTRAST: ICD-10-PCS | Mod: 26,,, | Performed by: RADIOLOGY

## 2023-01-01 PROCEDURE — 99214 OFFICE O/P EST MOD 30 MIN: CPT | Mod: PBBFAC

## 2023-01-01 RX ORDER — AMITRIPTYLINE HYDROCHLORIDE 25 MG/1
25 TABLET, FILM COATED ORAL NIGHTLY
Status: ON HOLD | COMMUNITY
End: 2024-01-01

## 2023-01-01 RX ORDER — DICLOFENAC SODIUM 30 MG/G
GEL TOPICAL 2 TIMES DAILY
COMMUNITY
End: 2024-01-01

## 2023-01-01 RX ORDER — BUPROPION HYDROCHLORIDE 150 MG/1
150 TABLET, EXTENDED RELEASE ORAL 2 TIMES DAILY
Qty: 180 TABLET | Refills: 1 | Status: SHIPPED | OUTPATIENT
Start: 2023-01-01 | End: 2024-01-01 | Stop reason: SDUPTHER

## 2023-01-01 RX ORDER — HYDROCHLOROTHIAZIDE 12.5 MG/1
TABLET ORAL
Qty: 30 TABLET | Refills: 5 | Status: SHIPPED | OUTPATIENT
Start: 2023-01-01 | End: 2023-01-01 | Stop reason: SDUPTHER

## 2023-01-01 RX ORDER — ASPIRIN 81 MG/1
81 TABLET ORAL DAILY
Qty: 30 TABLET | Refills: 11 | Status: ON HOLD | OUTPATIENT
Start: 2023-01-01 | End: 2024-01-01

## 2023-01-01 RX ORDER — DULOXETIN HYDROCHLORIDE 60 MG/1
60 CAPSULE, DELAYED RELEASE ORAL DAILY
Qty: 90 CAPSULE | Refills: 1 | Status: ON HOLD | OUTPATIENT
Start: 2023-01-01 | End: 2024-01-01

## 2023-01-01 RX ORDER — ALBUTEROL SULFATE 90 UG/1
2 AEROSOL, METERED RESPIRATORY (INHALATION) EVERY 6 HOURS PRN
Qty: 18 G | Refills: 5 | Status: SHIPPED | OUTPATIENT
Start: 2023-01-01

## 2023-01-01 RX ORDER — HYDROCHLOROTHIAZIDE 12.5 MG/1
12.5 TABLET ORAL DAILY
Qty: 30 TABLET | Refills: 5 | Status: ON HOLD | OUTPATIENT
Start: 2023-01-01 | End: 2024-01-01

## 2023-01-01 RX ADMIN — IOPAMIDOL 100 ML: 755 INJECTION, SOLUTION INTRAVENOUS at 01:04

## 2023-02-07 ENCOUNTER — OFFICE VISIT (OUTPATIENT)
Dept: FAMILY MEDICINE | Facility: CLINIC | Age: 63
End: 2023-02-07
Payer: MEDICAID

## 2023-02-07 VITALS
TEMPERATURE: 99 F | BODY MASS INDEX: 13.97 KG/M2 | DIASTOLIC BLOOD PRESSURE: 88 MMHG | RESPIRATION RATE: 18 BRPM | WEIGHT: 89 LBS | SYSTOLIC BLOOD PRESSURE: 136 MMHG | OXYGEN SATURATION: 96 % | HEART RATE: 91 BPM | HEIGHT: 67 IN

## 2023-02-07 DIAGNOSIS — F17.200 SMOKER: ICD-10-CM

## 2023-02-07 DIAGNOSIS — J30.1 SEASONAL ALLERGIC RHINITIS DUE TO POLLEN: ICD-10-CM

## 2023-02-07 DIAGNOSIS — R04.0 NOSEBLEED: Primary | ICD-10-CM

## 2023-02-07 PROCEDURE — 99212 PR OFFICE/OUTPT VISIT, EST, LEVL II, 10-19 MIN: ICD-10-PCS | Mod: ,,, | Performed by: NURSE PRACTITIONER

## 2023-02-07 PROCEDURE — 3008F BODY MASS INDEX DOCD: CPT | Mod: CPTII,,, | Performed by: NURSE PRACTITIONER

## 2023-02-07 PROCEDURE — 1159F PR MEDICATION LIST DOCUMENTED IN MEDICAL RECORD: ICD-10-PCS | Mod: CPTII,,, | Performed by: NURSE PRACTITIONER

## 2023-02-07 PROCEDURE — 3075F PR MOST RECENT SYSTOLIC BLOOD PRESS GE 130-139MM HG: ICD-10-PCS | Mod: CPTII,,, | Performed by: NURSE PRACTITIONER

## 2023-02-07 PROCEDURE — 3008F PR BODY MASS INDEX (BMI) DOCUMENTED: ICD-10-PCS | Mod: CPTII,,, | Performed by: NURSE PRACTITIONER

## 2023-02-07 PROCEDURE — 1159F MED LIST DOCD IN RCRD: CPT | Mod: CPTII,,, | Performed by: NURSE PRACTITIONER

## 2023-02-07 PROCEDURE — 99212 OFFICE O/P EST SF 10 MIN: CPT | Mod: ,,, | Performed by: NURSE PRACTITIONER

## 2023-02-07 PROCEDURE — 3079F PR MOST RECENT DIASTOLIC BLOOD PRESSURE 80-89 MM HG: ICD-10-PCS | Mod: CPTII,,, | Performed by: NURSE PRACTITIONER

## 2023-02-07 PROCEDURE — 3075F SYST BP GE 130 - 139MM HG: CPT | Mod: CPTII,,, | Performed by: NURSE PRACTITIONER

## 2023-02-07 PROCEDURE — 3079F DIAST BP 80-89 MM HG: CPT | Mod: CPTII,,, | Performed by: NURSE PRACTITIONER

## 2023-02-08 NOTE — PROGRESS NOTES
Subjective:       Patient ID: Dereck Delgado is a 62 y.o. male.    Chief Complaint: Epistaxis (Past couple weeks- reports ranges from every morning to every other morning. Has gotten worse here recently/)    Mr. Delgado presents to clinic with complaints of blowing blood clots when blowing his nose in the mornings, he also reports he coughs up small blood clots. He was recently started on plavix due to PAD, he is a current smoker. He reports that he uses flonase daily for nasal congestion. He has noticed bleeding in the last two weeks.    Epistaxis     Review of Systems   Constitutional: Negative.    HENT:  Positive for nasal congestion, nosebleeds, postnasal drip and sinus pressure/congestion.    Respiratory: Negative.     Cardiovascular: Negative.    Gastrointestinal: Negative.  Negative for blood in stool.   Genitourinary: Negative.  Negative for hematuria.   Neurological: Negative.    Psychiatric/Behavioral: Negative.         Objective:      Physical Exam  Vitals and nursing note reviewed.   Constitutional:       Appearance: Normal appearance.   HENT:      Head: Normocephalic.   Cardiovascular:      Rate and Rhythm: Normal rate and regular rhythm.      Pulses: Normal pulses.      Heart sounds: Normal heart sounds.   Pulmonary:      Effort: Pulmonary effort is normal.      Breath sounds: Normal breath sounds.   Abdominal:      General: Bowel sounds are normal.      Palpations: Abdomen is soft.   Musculoskeletal:         General: Normal range of motion.   Skin:     General: Skin is warm and dry.   Neurological:      Mental Status: He is alert and oriented to person, place, and time.   Psychiatric:         Behavior: Behavior normal.       Assessment:       Problem List Items Addressed This Visit    None  Visit Diagnoses       Nosebleed    -  Primary    Smoker        Seasonal allergic rhinitis due to pollen                Plan:        Stop flonase and use saline nasal mist to moisten nasal mucosa. If you need  nasal steroid, try nasocort which is water based. Consider humidifier at night, he reports difficulty using Cpap due to congestion.   Smoking cessation counseling 5 mins.

## 2023-03-23 ENCOUNTER — APPOINTMENT (OUTPATIENT)
Dept: RADIOLOGY | Facility: CLINIC | Age: 63
End: 2023-03-23
Attending: FAMILY MEDICINE
Payer: MEDICAID

## 2023-03-23 ENCOUNTER — OFFICE VISIT (OUTPATIENT)
Dept: FAMILY MEDICINE | Facility: CLINIC | Age: 63
End: 2023-03-23
Payer: MEDICAID

## 2023-03-23 VITALS
WEIGHT: 84 LBS | OXYGEN SATURATION: 95 % | RESPIRATION RATE: 20 BRPM | BODY MASS INDEX: 13.18 KG/M2 | TEMPERATURE: 98 F | HEART RATE: 85 BPM | SYSTOLIC BLOOD PRESSURE: 144 MMHG | HEIGHT: 67 IN | DIASTOLIC BLOOD PRESSURE: 92 MMHG

## 2023-03-23 DIAGNOSIS — R05.9 COUGH, UNSPECIFIED TYPE: ICD-10-CM

## 2023-03-23 DIAGNOSIS — I10 ESSENTIAL HYPERTENSION: ICD-10-CM

## 2023-03-23 DIAGNOSIS — R91.1 SOLITARY PULMONARY NODULE: Primary | ICD-10-CM

## 2023-03-23 LAB
ALBUMIN SERPL BCP-MCNC: 3.5 G/DL (ref 3.5–5)
ALBUMIN/GLOB SERPL: 0.9 {RATIO}
ALP SERPL-CCNC: 91 U/L (ref 45–115)
ALT SERPL W P-5'-P-CCNC: 18 U/L (ref 16–61)
ANION GAP SERPL CALCULATED.3IONS-SCNC: 11 MMOL/L (ref 7–16)
AST SERPL W P-5'-P-CCNC: 22 U/L (ref 15–37)
BASOPHILS # BLD AUTO: 0.05 K/UL (ref 0–0.2)
BASOPHILS NFR BLD AUTO: 0.7 % (ref 0–1)
BILIRUB SERPL-MCNC: 0.4 MG/DL (ref ?–1.2)
BUN SERPL-MCNC: 10 MG/DL (ref 7–18)
BUN/CREAT SERPL: 11 (ref 6–20)
CALCIUM SERPL-MCNC: 9.8 MG/DL (ref 8.5–10.1)
CHLORIDE SERPL-SCNC: 96 MMOL/L (ref 98–107)
CO2 SERPL-SCNC: 33 MMOL/L (ref 21–32)
CREAT SERPL-MCNC: 0.9 MG/DL (ref 0.7–1.3)
DIFFERENTIAL METHOD BLD: ABNORMAL
EGFR (NO RACE VARIABLE) (RUSH/TITUS): 95 ML/MIN/1.73M²
EOSINOPHIL # BLD AUTO: 0.06 K/UL (ref 0–0.5)
EOSINOPHIL NFR BLD AUTO: 0.9 % (ref 1–4)
ERYTHROCYTE [DISTWIDTH] IN BLOOD BY AUTOMATED COUNT: 14.6 % (ref 11.5–14.5)
GLOBULIN SER-MCNC: 4.1 G/DL (ref 2–4)
GLUCOSE SERPL-MCNC: 61 MG/DL (ref 74–106)
HCT VFR BLD AUTO: 57 % (ref 40–54)
HGB BLD-MCNC: 19.2 G/DL (ref 13.5–18)
IMM GRANULOCYTES # BLD AUTO: 0.01 K/UL (ref 0–0.04)
IMM GRANULOCYTES NFR BLD: 0.1 % (ref 0–0.4)
LYMPHOCYTES # BLD AUTO: 1.19 K/UL (ref 1–4.8)
LYMPHOCYTES NFR BLD AUTO: 17.6 % (ref 27–41)
MCH RBC QN AUTO: 33 PG (ref 27–31)
MCHC RBC AUTO-ENTMCNC: 33.7 G/DL (ref 32–36)
MCV RBC AUTO: 98.1 FL (ref 80–96)
MONOCYTES # BLD AUTO: 0.64 K/UL (ref 0–0.8)
MONOCYTES NFR BLD AUTO: 9.5 % (ref 2–6)
MPC BLD CALC-MCNC: 11.2 FL (ref 9.4–12.4)
NEUTROPHILS # BLD AUTO: 4.82 K/UL (ref 1.8–7.7)
NEUTROPHILS NFR BLD AUTO: 71.2 % (ref 53–65)
NRBC # BLD AUTO: 0 X10E3/UL
NRBC, AUTO (.00): 0 %
PLATELET # BLD AUTO: 263 K/UL (ref 150–400)
POTASSIUM SERPL-SCNC: 4.4 MMOL/L (ref 3.5–5.1)
PROT SERPL-MCNC: 7.6 G/DL (ref 6.4–8.2)
RBC # BLD AUTO: 5.81 M/UL (ref 4.6–6.2)
SODIUM SERPL-SCNC: 136 MMOL/L (ref 136–145)
WBC # BLD AUTO: 6.77 K/UL (ref 4.5–11)

## 2023-03-23 PROCEDURE — 1160F PR REVIEW ALL MEDS BY PRESCRIBER/CLIN PHARMACIST DOCUMENTED: ICD-10-PCS | Mod: CPTII,,, | Performed by: FAMILY MEDICINE

## 2023-03-23 PROCEDURE — 3080F PR MOST RECENT DIASTOLIC BLOOD PRESSURE >= 90 MM HG: ICD-10-PCS | Mod: CPTII,,, | Performed by: FAMILY MEDICINE

## 2023-03-23 PROCEDURE — 85025 CBC WITH DIFFERENTIAL: ICD-10-PCS | Mod: ,,, | Performed by: CLINICAL MEDICAL LABORATORY

## 2023-03-23 PROCEDURE — 1159F PR MEDICATION LIST DOCUMENTED IN MEDICAL RECORD: ICD-10-PCS | Mod: CPTII,,, | Performed by: FAMILY MEDICINE

## 2023-03-23 PROCEDURE — 1159F MED LIST DOCD IN RCRD: CPT | Mod: CPTII,,, | Performed by: FAMILY MEDICINE

## 2023-03-23 PROCEDURE — 71046 XR CHEST PA AND LATERAL: ICD-10-PCS | Mod: 26,,, | Performed by: RADIOLOGY

## 2023-03-23 PROCEDURE — 85025 COMPLETE CBC W/AUTO DIFF WBC: CPT | Mod: ,,, | Performed by: CLINICAL MEDICAL LABORATORY

## 2023-03-23 PROCEDURE — 3008F BODY MASS INDEX DOCD: CPT | Mod: CPTII,,, | Performed by: FAMILY MEDICINE

## 2023-03-23 PROCEDURE — 99213 OFFICE O/P EST LOW 20 MIN: CPT | Mod: ,,, | Performed by: FAMILY MEDICINE

## 2023-03-23 PROCEDURE — 3008F PR BODY MASS INDEX (BMI) DOCUMENTED: ICD-10-PCS | Mod: CPTII,,, | Performed by: FAMILY MEDICINE

## 2023-03-23 PROCEDURE — 3080F DIAST BP >= 90 MM HG: CPT | Mod: CPTII,,, | Performed by: FAMILY MEDICINE

## 2023-03-23 PROCEDURE — 71046 X-RAY EXAM CHEST 2 VIEWS: CPT | Mod: TC,RHCUB | Performed by: FAMILY MEDICINE

## 2023-03-23 PROCEDURE — 71046 X-RAY EXAM CHEST 2 VIEWS: CPT | Mod: 26,,, | Performed by: RADIOLOGY

## 2023-03-23 PROCEDURE — 3077F SYST BP >= 140 MM HG: CPT | Mod: CPTII,,, | Performed by: FAMILY MEDICINE

## 2023-03-23 PROCEDURE — 1160F RVW MEDS BY RX/DR IN RCRD: CPT | Mod: CPTII,,, | Performed by: FAMILY MEDICINE

## 2023-03-23 PROCEDURE — 80053 COMPREHENSIVE METABOLIC PANEL: ICD-10-PCS | Mod: ,,, | Performed by: CLINICAL MEDICAL LABORATORY

## 2023-03-23 PROCEDURE — 3077F PR MOST RECENT SYSTOLIC BLOOD PRESSURE >= 140 MM HG: ICD-10-PCS | Mod: CPTII,,, | Performed by: FAMILY MEDICINE

## 2023-03-23 PROCEDURE — 99213 PR OFFICE/OUTPT VISIT, EST, LEVL III, 20-29 MIN: ICD-10-PCS | Mod: ,,, | Performed by: FAMILY MEDICINE

## 2023-03-23 PROCEDURE — 80053 COMPREHEN METABOLIC PANEL: CPT | Mod: ,,, | Performed by: CLINICAL MEDICAL LABORATORY

## 2023-03-23 NOTE — PROGRESS NOTES
Dereck Delgado is a 63 y.o. male seen today for follow-up on his hypertension and COPD.  Patient unfortunately was unable to have his CT of the chest done and a follow-up chest x-ray today once again did show the lung nodule.  Patient agrees to have a CT of the chest with contrast done for follow-up.  Patient denies any recent COPD exacerbations but defers a pneumonia vaccine.      Past Medical History:   Diagnosis Date    COPD (chronic obstructive pulmonary disease)     CVA (cerebral vascular accident)     Depression      Family History   Problem Relation Age of Onset    Cancer Mother     Cancer Maternal Aunt     Heart disease Maternal Uncle     Birth defects Maternal Grandmother      Current Outpatient Medications on File Prior to Visit   Medication Sig Dispense Refill    albuterol (VENTOLIN HFA) 90 mcg/actuation inhaler Inhale 2 puffs into the lungs every 6 (six) hours as needed for Wheezing. Rescue 18 g 5    aspirin (ECOTRIN) 81 MG EC tablet TAKE 1 TABLET BY MOUTH EVERY DAY 30 tablet 2    budesonide-glycopyr-formoterol 160-9-4.8 mcg/actuation HFAA Inhale 2 puffs into the lungs 2 (two) times daily. 10.7 g 5    clopidogreL (PLAVIX) 75 mg tablet Take 1 tablet (75 mg total) by mouth once daily. 30 tablet 11    DULoxetine (CYMBALTA) 60 MG capsule Take 1 capsule (60 mg total) by mouth once daily. 90 capsule 1    hydroCHLOROthiazide (HYDRODIURIL) 12.5 MG Tab Take 1 tablet (12.5 mg total) by mouth once daily. 30 tablet 5     No current facility-administered medications on file prior to visit.     Immunization History   Administered Date(s) Administered    COVID-19, MRNA, LN-S, PF (MODERNA FULL 0.5 ML DOSE) 03/09/2021, 04/06/2021, 01/19/2022       Review of Systems   Constitutional:  Positive for malaise/fatigue. Negative for fever and weight loss.   Respiratory:  Positive for cough and shortness of breath.    Cardiovascular:  Negative for chest pain and palpitations.   Gastrointestinal:  Negative for nausea and  vomiting.   Psychiatric/Behavioral:  Negative for depression.       Vitals:    03/23/23 1433   BP: (!) 144/92   Pulse:    Resp:    Temp:        Physical Exam  Vitals reviewed.   Constitutional:       Appearance: Normal appearance.   HENT:      Head: Normocephalic.   Eyes:      Extraocular Movements: Extraocular movements intact.      Conjunctiva/sclera: Conjunctivae normal.      Pupils: Pupils are equal, round, and reactive to light.   Neck:      Thyroid: No thyroid mass or thyromegaly.   Cardiovascular:      Rate and Rhythm: Normal rate and regular rhythm.      Heart sounds: Normal heart sounds. No murmur heard.    No gallop.   Pulmonary:      Effort: Pulmonary effort is normal. No respiratory distress.      Breath sounds: Decreased air movement present. Decreased breath sounds present. No wheezing or rales.   Skin:     General: Skin is warm and dry.      Coloration: Skin is not jaundiced or pale.   Neurological:      Mental Status: He is alert.   Psychiatric:         Mood and Affect: Mood normal.         Behavior: Behavior normal.         Thought Content: Thought content normal.         Judgment: Judgment normal.        Assessment and Plan  Cough, unspecified type  -     X-Ray Chest PA And Lateral; Future; Expected date: 03/23/2023    Solitary pulmonary nodule  -     CT Chest W Wo Contrast; Future; Expected date: 03/23/2023    Essential hypertension  -     Comprehensive Metabolic Panel; Future; Expected date: 03/23/2023  -     CBC Auto Differential; Future; Expected date: 03/23/2023            Return to clinic after his CT scan of the chest is done.    Health Maintenance Topics with due status: Not Due       Topic Last Completion Date    [facility] Lung Screen 07/13/2022

## 2023-03-24 ENCOUNTER — TELEPHONE (OUTPATIENT)
Dept: FAMILY MEDICINE | Facility: CLINIC | Age: 63
End: 2023-03-24
Payer: MEDICAID

## 2023-03-24 DIAGNOSIS — R77.1 ABNORMALITY OF GLOBULIN: Primary | ICD-10-CM

## 2023-03-24 NOTE — TELEPHONE ENCOUNTER
Pt notified and verbalized understanding. Spep added, advised to keep scheduled follow up on 4.21.

## 2023-03-24 NOTE — TELEPHONE ENCOUNTER
----- Message from Jose Manuel Castro MD sent at 3/24/2023  7:47 AM CDT -----  Please add an SPEP for elevated globulin.  Due to COPD patient's blood is started thick in.  Once again he needs to quit smoking.

## 2023-04-21 NOTE — TELEPHONE ENCOUNTER
----- Message from Jose Manuel Castro MD sent at 4/20/2023  4:16 PM CDT -----  No were some lesions were noted.

## 2023-04-25 NOTE — PROGRESS NOTES
Subjective:       Patient ID: Dereck Delgado is a 63 y.o. male.    Chief Complaint: COPD and Shortness of Breath    COPD  This is a chronic problem. The current episode started more than 1 month ago. The problem has been unchanged. Pertinent negatives include no abdominal pain, arthralgias, chest pain, chills, congestion, headaches or rash.   Shortness of Breath  Pertinent negatives include no abdominal pain, chest pain, ear pain, headaches or rash. His past medical history is significant for COPD.   Past Medical History:   Diagnosis Date    COPD (chronic obstructive pulmonary disease)     CVA (cerebral vascular accident)     Depression      Past Surgical History:   Procedure Laterality Date    EYE SURGERY       Family History   Problem Relation Age of Onset    Cancer Mother     Cancer Maternal Aunt     Heart disease Maternal Uncle     Birth defects Maternal Grandmother      Review of patient's allergies indicates:  No Known Allergies   Social History     Tobacco Use    Smoking status: Every Day     Packs/day: 0.75     Years: 53.00     Pack years: 39.75     Types: Cigarettes     Passive exposure: Current    Smokeless tobacco: Never   Substance Use Topics    Alcohol use: Yes    Drug use: Yes     Types: Marijuana      Review of Systems   Constitutional:  Negative for chills, activity change and night sweats.   HENT:  Negative for congestion and ear pain.    Eyes:  Negative for redness and itching.   Respiratory:  Positive for shortness of breath.    Cardiovascular:  Negative for chest pain and palpitations.   Musculoskeletal:  Negative for arthralgias and back pain.   Skin:  Negative for rash.   Gastrointestinal:  Negative for abdominal pain and abdominal distention.   Neurological:  Negative for dizziness and headaches.   Hematological:  Negative for adenopathy. Does not bruise/bleed easily.   Psychiatric/Behavioral:  Negative for confusion. The patient is not nervous/anxious.      Objective:      Physical Exam    Constitutional: He is oriented to person, place, and time. He appears well-developed and well-nourished.   HENT:   Head: Normocephalic.   Nose: Nose normal.   Mouth/Throat: Oropharynx is clear and moist.   Neck: No JVD present. No thyromegaly present.   Cardiovascular: Normal rate, regular rhythm, normal heart sounds and intact distal pulses.   Pulmonary/Chest: Normal expansion, hyperinflation, symmetric chest wall expansion, effort normal and breath sounds normal.   Abdominal: Soft. Bowel sounds are normal.   Musculoskeletal:         General: Normal range of motion.      Cervical back: Normal range of motion and neck supple.   Lymphadenopathy: No supraclavicular adenopathy is present.     He has no cervical adenopathy.   Neurological: He is alert and oriented to person, place, and time. He has normal reflexes.   Skin: Skin is warm and dry.   Psychiatric: He has a normal mood and affect. His behavior is normal.   Personal Diagnostic Review  none pertinent    No flowsheet data found.      Assessment:       1. Chronic obstructive pulmonary disease, unspecified COPD type    2. Tobacco abuse disorder        Outpatient Encounter Medications as of 4/24/2023   Medication Sig Dispense Refill    albuterol (VENTOLIN HFA) 90 mcg/actuation inhaler Inhale 2 puffs into the lungs every 6 (six) hours as needed for Wheezing. Rescue 18 g 5    aspirin (ECOTRIN) 81 MG EC tablet TAKE 1 TABLET BY MOUTH EVERY DAY 30 tablet 2    budesonide-glycopyr-formoterol 160-9-4.8 mcg/actuation HFAA Inhale 2 puffs into the lungs 2 (two) times daily. 10.7 g 5    clopidogreL (PLAVIX) 75 mg tablet Take 1 tablet (75 mg total) by mouth once daily. 30 tablet 11    DULoxetine (CYMBALTA) 60 MG capsule Take 1 capsule (60 mg total) by mouth once daily. 90 capsule 1    hydroCHLOROthiazide (HYDRODIURIL) 12.5 MG Tab Take 1 tablet (12.5 mg total) by mouth once daily. 30 tablet 5     No facility-administered encounter medications on file as of 4/24/2023.     No  orders of the defined types were placed in this encounter.      Plan:       Problem List Items Addressed This Visit          Pulmonary    Chronic obstructive pulmonary disease     Patient currently stable on triple inhaler plus Ventolin no change in the regimen  Getting yearly CT screenings last 1 done in April was negative              Other    Tobacco abuse disorder     Discussed smoking cessation he is cut back and is using nicotine gum

## 2023-04-25 NOTE — ASSESSMENT & PLAN NOTE
Patient currently stable on triple inhaler plus Ventolin no change in the regimen  Getting yearly CT screenings last 1 done in April was negative

## 2023-04-26 NOTE — PROGRESS NOTES
Dereck Delgado is a 63 y.o. male here for Follow-up (6 month)        PCP: Jose Manuel Castro  Referring Provider: No referring provider defined for this encounter.     HPI:  Patient is a 64 yo male who presents to clinic today for follow-up of elevated liver enzymes. Patient denies any complaints or problems at present. He does tell me that he experiences heartburn with certain foods. States this is infrequent, occurring once per month at maximum with spicy foods. He also reports some hematochezia over the past several weeks. He relates this to hemorrhoids. He denies any abdominal pain, constipation, diarrhea, melena. He drinks alcohol daily along with marijuana. Denies any IV drug us. Has never had previous endoscopy. Has been doing Cologuard for screening with negative results. Denies any family history of CRC.     Recent LFTs within normal limits including Tbili.     Follow-up  Pertinent negatives include no abdominal pain, chest pain, fatigue, headaches, nausea, vomiting or weakness.       ROS:  Review of Systems   Constitutional:  Negative for activity change, appetite change, fatigue and unexpected weight change.   HENT:  Negative for trouble swallowing.    Respiratory:  Negative for shortness of breath.    Cardiovascular:  Negative for chest pain.   Gastrointestinal:  Positive for blood in stool and reflux. Negative for abdominal pain, constipation, diarrhea, nausea and vomiting.   Musculoskeletal:  Negative for gait problem.   Integumentary:  Negative for color change.   Neurological:  Negative for weakness and headaches.   Psychiatric/Behavioral:  Negative for confusion.         PMHX:  has a past medical history of COPD (chronic obstructive pulmonary disease), CVA (cerebral vascular accident), and Depression.    PSHX:  has a past surgical history that includes Eye surgery.    PFHX: family history includes Birth defects in his maternal grandmother; Cancer in his maternal aunt and mother; Heart  "disease in his maternal uncle.    PSlHX:  reports that he has been smoking cigarettes. He has a 39.75 pack-year smoking history. He has been exposed to tobacco smoke. He has quit using smokeless tobacco.  His smokeless tobacco use included snuff and chew. He reports current alcohol use. He reports current drug use. Drug: Marijuana.        Review of patient's allergies indicates:  No Known Allergies    Medication List with Changes/Refills   Current Medications    ALBUTEROL (VENTOLIN HFA) 90 MCG/ACTUATION INHALER    Inhale 2 puffs into the lungs every 6 (six) hours as needed for Wheezing. Rescue    ASPIRIN (ECOTRIN) 81 MG EC TABLET    TAKE 1 TABLET BY MOUTH EVERY DAY    BUDESONIDE-GLYCOPYR-FORMOTEROL 160-9-4.8 MCG/ACTUATION HFAA    Inhale 2 puffs into the lungs 2 (two) times daily.    CLOPIDOGREL (PLAVIX) 75 MG TABLET    Take 1 tablet (75 mg total) by mouth once daily.    DULOXETINE (CYMBALTA) 60 MG CAPSULE    Take 1 capsule (60 mg total) by mouth once daily.    HYDROCHLOROTHIAZIDE (HYDRODIURIL) 12.5 MG TAB    Take 1 tablet (12.5 mg total) by mouth once daily.        Objective Findings:  Vital Signs:  BP (!) 154/80   Pulse 79   Ht 5' 7" (1.702 m)   Wt 41.6 kg (91 lb 12.8 oz)   SpO2 95%   BMI 14.38 kg/m²  Body mass index is 14.38 kg/m².    Physical Exam:  Physical Exam  Vitals reviewed.   Constitutional:       General: He is not in acute distress.  HENT:      Mouth/Throat:      Mouth: Mucous membranes are moist.   Cardiovascular:      Rate and Rhythm: Normal rate and regular rhythm.      Pulses: Normal pulses.      Heart sounds: Normal heart sounds.   Pulmonary:      Effort: Pulmonary effort is normal.      Breath sounds: Normal breath sounds.   Abdominal:      General: Bowel sounds are normal. There is no distension.      Palpations: Abdomen is soft.      Tenderness: There is no abdominal tenderness. There is no guarding.   Musculoskeletal:         General: Normal range of motion.   Skin:     General: Skin is " warm and dry.   Neurological:      Mental Status: He is alert. Mental status is at baseline.   Psychiatric:         Mood and Affect: Mood normal.        Labs:  Lab Results   Component Value Date    WBC 6.77 03/23/2023    HGB 19.2 (H) 03/23/2023    HCT 57.0 (H) 03/23/2023    MCV 98.1 (H) 03/23/2023    RDW 14.6 (H) 03/23/2023     03/23/2023    LYMPH 17.6 (L) 03/23/2023    LYMPH 1.19 03/23/2023    MONO 9.5 (H) 03/23/2023    EOS 0.06 03/23/2023    BASO 0.05 03/23/2023     Lab Results   Component Value Date     03/23/2023    K 4.4 03/23/2023    CL 96 (L) 03/23/2023    CO2 33 (H) 03/23/2023    GLU 61 (L) 03/23/2023    BUN 10 03/23/2023    CREATININE 0.90 03/23/2023    CALCIUM 9.8 03/23/2023    PROT 7.6 03/23/2023    ALBUMIN 3.5 03/23/2023    BILITOT 0.4 03/23/2023    ALKPHOS 91 03/23/2023    AST 22 03/23/2023    ALT 18 03/23/2023         Imaging: CT Chest W Wo Contrast    Result Date: 4/20/2023  EXAMINATION: CT CHEST W WO CONTRAST CLINICAL HISTORY: Solitary pulmonary nodule Abnormal xray - lung nodule, >= 1 cm; TECHNIQUE: Spiral CT sections were obtained through the lungs before and after the IV administration of 100 mL of Isovue-370 without immediate complication. Sagittal and coronal multiplanar reconstruction images are also examined. The CT examination was performed using one or more of the following dose reduction techniques: Automated exposure control, adjustment of the mA and kV according to patient's size, use of acute or iterative reconstruction techniques. no acute osseous abnormality COMPARISON: Chest x-ray March 23, 2023.  CT chest July 13, 2022 FINDINGS: There is no new or worsening lung nodule.  There is some stable pleural based nodular density compatible with scarring in the posterior aspect of either lung apex, unchanged from July 13, 2022.  There are moderate changes of centrilobular emphysema in the lung apices.  There is no confluent infiltrate to suggest pneumonia. There is no mediastinal  mass or mediastinal lymphadenopathy.  There is no thoracic aorta dissection or localized aorta aneurysm. There is no pleural or pericardial effusion. There is a water density benign cyst in the left hepatic lobe anteriorly and superiorly. There is mild scattered thoracic spondylosis and mild scattered thoracic degenerative disc narrowing.     No new or enlarging suspicious lung nodule is identified.  There is some stable pleural based parenchymal scarring in either lung apex posteriorly without change from July 13, 2022. Lungs are slightly hyperexpanded with changes of centrilobular emphysema as before Otherwise unchanged Electronically signed by: Denver Larson Date:    04/20/2023 Time:    13:57        Assessment:  Dereck Delgado is a 63 y.o. male here with:  1. Elevated liver enzymes    2. Hematochezia          Recommendations:  1. US liver and elastography   2. Offered colonoscopy for hematochezia - patient declined. Instructed to contact office if bleeding increases or he changes his mind.     Follow up in about 6 months (around 10/26/2023).      Order summary:  Orders Placed This Encounter    US Abdomen Limited_Liver    US Elastography Parenchyma (Organ)       Thank you for allowing me to participate in the care of Dereck Delgado.      Lucille Kim, FNP-BC, AG-ACNP-BC

## 2023-04-27 NOTE — PROGRESS NOTES
Dereck Delgado is a 63 y.o. male seen today for follow-up on his CT of the chest.  Patient's chest CT did not show knees worrisome nodules her new lesions.  Still, patient does have polycythemia likely secondary to his COPD and hypoxia.  I am asked him  once again to quit smoking.  Patient reports no new worsening of his shortness of breath but has had chest tightness and discomfort.  He relates his chest tightness to reflux symptoms.  This patient uses a portable ventilator for his history of severe COPD with chronic respiratory failure.  Patient requires use of a noninvasive ventilator due to the severity cuff his diagnosis.  A ventilator is required to decrease the work of breathing, improve pulmonary function status and interruption of respiratory support could lead to serious harm such as decline in health status or worsening of his condition.  He may start to retain CO2, and this may lead to more frequent hospital admissions and possibly death.  For now the patient will continue his current medications and portable noninvasive ventilation machine.  Patient has a history of severe COPD, hypoxia and respiratory failure.  So far the current regimen seems to be helping.    Past Medical History:   Diagnosis Date    COPD (chronic obstructive pulmonary disease)     CVA (cerebral vascular accident)     Depression      Family History   Problem Relation Age of Onset    Cancer Mother     Cancer Maternal Aunt     Heart disease Maternal Uncle     Birth defects Maternal Grandmother      Current Outpatient Medications on File Prior to Visit   Medication Sig Dispense Refill    albuterol (VENTOLIN HFA) 90 mcg/actuation inhaler Inhale 2 puffs into the lungs every 6 (six) hours as needed for Wheezing. Rescue 18 g 5    budesonide-glycopyr-formoterol 160-9-4.8 mcg/actuation HFAA Inhale 2 puffs into the lungs 2 (two) times daily. 10.7 g 5    clopidogreL (PLAVIX) 75 mg tablet Take 1 tablet (75 mg total) by mouth once daily. 30  tablet 11    DULoxetine (CYMBALTA) 60 MG capsule Take 1 capsule (60 mg total) by mouth once daily. 90 capsule 1    hydroCHLOROthiazide (HYDRODIURIL) 12.5 MG Tab Take 1 tablet (12.5 mg total) by mouth once daily. 30 tablet 5    [DISCONTINUED] aspirin (ECOTRIN) 81 MG EC tablet TAKE 1 TABLET BY MOUTH EVERY DAY 30 tablet 2     No current facility-administered medications on file prior to visit.     Immunization History   Administered Date(s) Administered    COVID-19, MRNA, LN-S, PF (MODERNA FULL 0.5 ML DOSE) 03/09/2021, 04/06/2021, 01/19/2022       Review of Systems   Constitutional:  Positive for malaise/fatigue. Negative for fever and weight loss.   Respiratory:  Positive for shortness of breath.    Cardiovascular:  Positive for chest pain. Negative for palpitations.   Gastrointestinal:  Negative for nausea and vomiting.   Psychiatric/Behavioral:  Negative for depression.       There were no vitals filed for this visit.    Physical Exam  Vitals reviewed.   Constitutional:       Appearance: Normal appearance.   HENT:      Head: Normocephalic.   Eyes:      Extraocular Movements: Extraocular movements intact.      Conjunctiva/sclera: Conjunctivae normal.      Pupils: Pupils are equal, round, and reactive to light.   Neck:      Thyroid: No thyroid mass or thyromegaly.   Cardiovascular:      Rate and Rhythm: Normal rate and regular rhythm.      Heart sounds: Normal heart sounds. No murmur heard.    No gallop.   Pulmonary:      Effort: Pulmonary effort is normal. No respiratory distress.      Breath sounds: Decreased air movement present. Decreased breath sounds present. No wheezing or rales.   Skin:     General: Skin is warm and dry.      Coloration: Skin is not jaundiced or pale.   Neurological:      Mental Status: He is alert.   Psychiatric:         Mood and Affect: Mood normal.         Behavior: Behavior normal.         Thought Content: Thought content normal.         Judgment: Judgment normal.        Assessment and  Plan  Chest pain, unspecified type  -     POCT EKG 12-LEAD (Manually Resulted by Ordering Provider)    PVD (peripheral vascular disease)  -     aspirin (ECOTRIN) 81 MG EC tablet; Take 1 tablet (81 mg total) by mouth once daily.  Dispense: 30 tablet; Refill: 11              His EKG is concerning with possible septal ischemia and right ventricular hypertrophy      Return to clinic in 3 months or as needed.  Patient will call 911 for any further episodes of chest discomfort.    Health Maintenance Topics with due status: Not Due       Topic Last Completion Date    LDCT Lung Screen 04/20/2023

## 2023-05-03 NOTE — PROGRESS NOTES
Cardiology Clinic Note:    PCP: Jose Manuel Castro MD    REFERRING PHYSICIAN:     CHIEF COMPLAINT: chest pain    HISTORY OF PRESENT ILLNESS:  Dereck Delgado is a 63 y.o. male who presents for evaluation of chest pain, midepigastric, 5/10 at most severe, lasts two to five minutes, resolves spontaneously or with rest. CP is not associated with nausea, diaphoresis or shortness of breath. He experience chest pain symptoms once a week,  do not occur everytime he exerts himself.  No previous cardiac eval.  He is an active smoker at half a pack and three or four joints a day.  He has not limited activty due to chest pain.       Review of Systems:  Review of Systems   Constitutional: Positive for malaise/fatigue. Negative for diaphoresis, night sweats and weight gain.   HENT:  Negative for congestion, ear pain, hearing loss, nosebleeds and sore throat.    Eyes:  Positive for blurred vision and vision loss in left eye. Negative for double vision, pain, photophobia and visual disturbance.   Cardiovascular:  Positive for chest pain and palpitations. Negative for claudication, dyspnea on exertion, irregular heartbeat, leg swelling, near-syncope, orthopnea and syncope.        HX PVD, previous stents p[aced in 10/22, stents in bilateral upper legs. Has mild exertional claudication, which had improved after PTA bilat, symptoms have reoccurred .    Respiratory:  Positive for cough, shortness of breath, sleep disturbances due to breathing and snoring. Negative for wheezing.         Has CAROLYN, non-compliant with CPAP which has been prescribed.   Endocrine: Negative for cold intolerance, heat intolerance, polydipsia, polyphagia and polyuria.   Hematologic/Lymphatic: Negative for bleeding problem. Does not bruise/bleed easily.   Skin:  Negative for dry skin, flushing, itching, rash and skin cancer.   Musculoskeletal:  Positive for myalgias. Negative for arthritis, back pain, falls, joint pain, muscle cramps and muscle weakness.    Gastrointestinal:  Negative for abdominal pain, change in bowel habit, constipation, diarrhea, dysphagia, heartburn, nausea and vomiting.   Genitourinary:  Negative for bladder incontinence, dysuria, flank pain, frequency and nocturia.   Neurological:  Negative for dizziness, focal weakness, headaches, light-headedness, loss of balance, numbness, paresthesias and seizures.   Psychiatric/Behavioral:  Negative for depression, memory loss and substance abuse. The patient is not nervous/anxious.    Allergic/Immunologic: Negative for environmental allergies.        PAST MEDICAL HISTORY:  Past Medical History:   Diagnosis Date    COPD (chronic obstructive pulmonary disease)     CVA (cerebral vascular accident)     Depression    Cataract on right eye  Hypertension: for many years, compliant with neds.     PAST SURGICAL HISTORY:  Past Surgical History:   Procedure Laterality Date    EYE SURGERY         SOCIAL HISTORY:  Social History     Socioeconomic History    Marital status:    Occupational History     Comment: disabled   Tobacco Use    Smoking status: Every Day     Packs/day: 0.75     Years: 53.00     Pack years: 39.75     Types: Cigarettes     Passive exposure: Current    Smokeless tobacco: Former     Types: Snuff, Chew   Substance and Sexual Activity    Alcohol use: Yes     Comment: daily; 3 beers a day; 3 shots of liquor a day    Drug use: Yes     Types: Marijuana     Comment: 3 times daily    Sexual activity: Never       FAMILY HISTORY:  Family History   Problem Relation Age of Onset    Cancer Mother     Cancer Maternal Aunt     Heart disease Maternal Uncle     Birth defects Maternal Grandmother        ALLERGIES:  Allergies as of 05/03/2023    (No Known Allergies)         MEDICATIONS:  Current Outpatient Medications on File Prior to Visit   Medication Sig Dispense Refill    albuterol (VENTOLIN HFA) 90 mcg/actuation inhaler Inhale 2 puffs into the lungs every 6 (six) hours as needed for Wheezing. Rescue 18 g  "5    amitriptyline (ELAVIL) 25 MG tablet Take 25 mg by mouth every evening.      aspirin (ECOTRIN) 81 MG EC tablet Take 1 tablet (81 mg total) by mouth once daily. 30 tablet 11    clopidogreL (PLAVIX) 75 mg tablet Take 1 tablet (75 mg total) by mouth once daily. 30 tablet 11    DULoxetine (CYMBALTA) 60 MG capsule Take 1 capsule (60 mg total) by mouth once daily. 90 capsule 1    hydroCHLOROthiazide (HYDRODIURIL) 12.5 MG Tab Take 1 tablet (12.5 mg total) by mouth once daily. 30 tablet 5    budesonide-glycopyr-formoterol 160-9-4.8 mcg/actuation HFAA Inhale 2 puffs into the lungs 2 (two) times daily. (Patient not taking: Reported on 5/3/2023) 10.7 g 5     No current facility-administered medications on file prior to visit.          PHYSICAL EXAM:  Blood pressure (!) 130/90, pulse 72, height 5' 7" (1.702 m), weight 40.8 kg (90 lb), SpO2 99 %.  Wt Readings from Last 3 Encounters:   05/03/23 40.8 kg (90 lb)   04/27/23 41.3 kg (91 lb)   04/26/23 41.6 kg (91 lb 12.8 oz)      Body mass index is 14.1 kg/m².    Physical Exam  Vitals and nursing note reviewed.   Constitutional:       Appearance: Normal appearance. He is normal weight. He is ill-appearing.   HENT:      Head: Normocephalic and atraumatic.      Right Ear: External ear normal.      Left Ear: External ear normal.   Eyes:      General: No scleral icterus.        Right eye: No discharge.         Left eye: No discharge.      Extraocular Movements: Extraocular movements intact.      Conjunctiva/sclera: Conjunctivae normal.      Pupils: Pupils are equal, round, and reactive to light.   Cardiovascular:      Rate and Rhythm: Normal rate and regular rhythm.      Heart sounds: Normal heart sounds. No murmur heard.    No friction rub. No gallop.      Comments: Decreased pulses bilat lower extremities.   Pulmonary:      Breath sounds: Normal breath sounds. No wheezing, rhonchi or rales.      Comments: Breath sounds decreased bilatearlly.   Chest:      Chest wall: No tenderness. "   Abdominal:      General: Abdomen is flat. Bowel sounds are normal. There is no distension.      Palpations: Abdomen is soft.      Tenderness: There is no abdominal tenderness. There is no guarding or rebound.   Musculoskeletal:         General: No swelling or tenderness. Normal range of motion.      Cervical back: Normal range of motion and neck supple.   Skin:     General: Skin is warm and dry.      Findings: No erythema or rash.   Neurological:      General: No focal deficit present.      Mental Status: He is alert and oriented to person, place, and time.      Cranial Nerves: No cranial nerve deficit.      Motor: No weakness.      Gait: Gait normal.   Psychiatric:         Mood and Affect: Mood normal.         Behavior: Behavior normal.         Thought Content: Thought content normal.         Judgment: Judgment normal.        LABS REVIEWED:  Lab Results   Component Value Date    WBC 6.77 03/23/2023    RBC 5.81 03/23/2023    HGB 19.2 (H) 03/23/2023    HCT 57.0 (H) 03/23/2023    MCV 98.1 (H) 03/23/2023    MCH 33.0 (H) 03/23/2023    MCHC 33.7 03/23/2023    RDW 14.6 (H) 03/23/2023     03/23/2023    MPV 11.2 03/23/2023    NRBC 0.0 03/23/2023     Lab Results   Component Value Date     03/23/2023    K 4.4 03/23/2023    CL 96 (L) 03/23/2023    CO2 33 (H) 03/23/2023    BUN 10 03/23/2023     Lab Results   Component Value Date    AST 22 03/23/2023    ALT 18 03/23/2023     Lab Results   Component Value Date    GLU 61 (L) 03/23/2023     No results found for: CHOL, HDL, LDL, TRIG, CHOLHDL    CARDIAC STUDIES REVIEWED:    OTHER IMAGING STUDIES REVIEWED:        ASSESSMENT:   Chest pain, unspecified type  -     Ambulatory referral/consult to Cardiology  -     EKG 12-lead; Future    Abnormal ECG  -     Ambulatory referral/consult to Cardiology  -     EKG 12-lead; Future          PLAN:  1. Chest pain atypical, however multiple risk factors, will order Lexiscan cardiolye to eval for inducibile ischemia, echo to eval for  pulmonary hyptertension  2. Hypertension: mostly controlled on current meds.   3. Claudication: has appt with Dr. Barrera this afternoon  4. Tobacco abuse, discussed smoking cessation, pt declines pharmacologic intervention at this time.   5. COPD, with continued tobacco abuse  6. . Recent pnuemonia, recovering at home  7. Substance abuse, continues to smoke marijuana against medical advice  8. Bilateral hip secondary to osteoarthritis, limiting activity.

## 2023-05-03 NOTE — PROGRESS NOTES
Subjective:       Patient ID: Dereck Delgado is a 63 y.o. male.    Chief Complaint: Follow-up (6 mo follow-up)  Status patient follow-up.  He is having cardiac evaluation by Dr. Graves.  States his leg symptoms are unchanged steal feeling cold and some calf and thigh claudication type symptoms.  His pre intervention studies were 0.6 0.7 his ABIs we are going to get post study ABIs states he has a scheduled 15th we will see him back after this.  On exam he is got 2+ femoral pulses has no wounds or tissue loss    States his smokes half pack per days improving slowly  family history includes Birth defects in his maternal grandmother; Cancer in his maternal aunt and mother; Heart disease in his maternal uncle.  Past Medical History:   Diagnosis Date    COPD (chronic obstructive pulmonary disease)     CVA (cerebral vascular accident)     Depression       Past Surgical History:   Procedure Laterality Date    EYE SURGERY         reports that he has been smoking cigarettes. He has a 39.75 pack-year smoking history. He has been exposed to tobacco smoke. He has quit using smokeless tobacco.  His smokeless tobacco use included snuff and chew. He reports current alcohol use. He reports current drug use. Drug: Marijuana.   HPI  Review of Systems      Objective:      Pulse 110   Temp 98.3 °F (36.8 °C)   SpO2 99%    Physical Exam  Constitutional:       Appearance: Normal appearance.   Cardiovascular:      Pulses: Normal pulses.      Comments: 2+ femoral pulses bilaterally  Abdominal:      General: Abdomen is flat.      Palpations: Abdomen is soft.   Skin:     General: Skin is warm.      Capillary Refill: Capillary refill takes less than 2 seconds.   Neurological:      General: No focal deficit present.      Mental Status: He is alert.         Assessment:       1. PVD (peripheral vascular disease)    2. Tobacco abuse disorder    3. Claudication        Plan:       ABIs with without exercise follow-up after above, continue work  on smoking cessation continue antiplatelets

## 2023-05-25 NOTE — PROGRESS NOTES
Vascular clinic follow-up.  He is if these are post intervention studies JADEN right 0.8 JADEN left 0.87     These improvement preoperatively.  Educated patient continue antiplatelets    Follow-up 1 year

## 2023-07-27 NOTE — PROGRESS NOTES
eDreck Delgado is a 63 y.o. male seen today for follow-up on his COPD and depression.  Unfortunately does continue to smoke and we discussed smoking cessation and a trial of Wellbutrin to add his Cymbalta.  Patient is willing and I have encouraged him strongly to quit smoking.  Patient has had no recent COPD exacerbations but remains very short of breath and his cardiac workup is ongoing the patient has failed to follow-up for his echocardiogram and stress test.  Patient agrees to follow-up with Cardiology next month to discuss his options.  Patient has PVD is well controlled after recent evaluation by vascular Medicine.    Past Medical History:   Diagnosis Date    COPD (chronic obstructive pulmonary disease)     CVA (cerebral vascular accident)     Depression      Family History   Problem Relation Age of Onset    Cancer Mother     Cancer Maternal Aunt     Heart disease Maternal Uncle     Birth defects Maternal Grandmother      Current Outpatient Medications on File Prior to Visit   Medication Sig Dispense Refill    albuterol (VENTOLIN HFA) 90 mcg/actuation inhaler Inhale 2 puffs into the lungs every 6 (six) hours as needed for Wheezing. Rescue 18 g 5    amitriptyline (ELAVIL) 25 MG tablet Take 25 mg by mouth every evening.      aspirin (ECOTRIN) 81 MG EC tablet Take 1 tablet (81 mg total) by mouth once daily. 30 tablet 11    clopidogreL (PLAVIX) 75 mg tablet Take 1 tablet (75 mg total) by mouth once daily. 30 tablet 11    diclofenac sodium (SOLARAZE) 3 % gel Apply topically 2 (two) times daily.      hydroCHLOROthiazide (HYDRODIURIL) 12.5 MG Tab TAKE 1 TABLET BY MOUTH EVERY DAY 30 tablet 5    [DISCONTINUED] budesonide-glycopyr-formoterol 160-9-4.8 mcg/actuation HFAA Inhale 2 puffs into the lungs 2 (two) times daily. 10.7 g 5    [DISCONTINUED] DULoxetine (CYMBALTA) 60 MG capsule Take 1 capsule (60 mg total) by mouth once daily. 90 capsule 1     No current facility-administered medications on file prior to visit.      Immunization History   Administered Date(s) Administered    COVID-19, MRNA, LN-S, PF (MODERNA FULL 0.5 ML DOSE) 03/09/2021, 04/06/2021, 01/19/2022       Review of Systems   Constitutional:  Positive for malaise/fatigue. Negative for fever and weight loss.   Respiratory:  Positive for shortness of breath.    Cardiovascular:  Negative for chest pain and palpitations.   Gastrointestinal:  Negative for nausea and vomiting.   Psychiatric/Behavioral:  Negative for depression.       Vitals:    07/27/23 1515   BP: 122/78   Pulse: 80   Resp: 18   Temp: 98.3 °F (36.8 °C)       Physical Exam  Vitals reviewed.   Constitutional:       Appearance: Normal appearance.   HENT:      Head: Normocephalic.   Eyes:      Extraocular Movements: Extraocular movements intact.      Conjunctiva/sclera: Conjunctivae normal.      Pupils: Pupils are equal, round, and reactive to light.   Neck:      Thyroid: No thyroid mass or thyromegaly.   Cardiovascular:      Rate and Rhythm: Normal rate and regular rhythm.      Heart sounds: Normal heart sounds. No murmur heard.    No gallop.   Pulmonary:      Effort: Pulmonary effort is normal. No respiratory distress.      Breath sounds: Decreased air movement present. Decreased breath sounds present. No wheezing or rales.   Skin:     General: Skin is warm and dry.      Coloration: Skin is not jaundiced or pale.   Neurological:      Mental Status: He is alert.   Psychiatric:         Mood and Affect: Mood normal.         Behavior: Behavior normal.         Thought Content: Thought content normal.         Judgment: Judgment normal.        Assessment and Plan  Chronic obstructive pulmonary disease, unspecified COPD type  -     budesonide-glycopyr-formoterol 160-9-4.8 mcg/actuation HFAA; Inhale 2 puffs into the lungs 2 (two) times daily.  Dispense: 10.7 g; Refill: 5    Depression, unspecified depression type  -     DULoxetine (CYMBALTA) 60 MG capsule; Take 1 capsule (60 mg total) by mouth once daily.   Dispense: 90 capsule; Refill: 1  -     buPROPion (WELLBUTRIN SR) 150 MG TBSR 12 hr tablet; Take 1 tablet (150 mg total) by mouth 2 (two) times daily.  Dispense: 180 tablet; Refill: 1            Return to clinic in 3 months or as needed.  Today, patient did defer the Prevnar 20 but I have encouraged him to consider this.  Health Maintenance Topics with due status: Not Due       Topic Last Completion Date    Influenza Vaccine 02/07/2023    [facility] Lung Screen 04/20/2023

## 2023-08-04 NOTE — TELEPHONE ENCOUNTER
Pt didn't show up for his mar and echo. I called pt and he is not interested in rescheduling right now. He is scared to have the test done and he states he would talk to  about that. I informed him of his next appointment and he voiced understanding.

## 2023-10-26 NOTE — PROGRESS NOTES
Dereck Delgado is a 63 y.o. male seen today for follow-up on his COPD and history of smoking.  Patient reports he is down to less than half pack a day currently.  He reports they Wellbutrin is helping and is encouraged to continue to decrease his intake of cigarettes.  Patient did miss his follow-up Cardiology but has reschedule this.  Today, patient did defer his flu vaccination.      Past Medical History:   Diagnosis Date    COPD (chronic obstructive pulmonary disease)     CVA (cerebral vascular accident)     Depression      Family History   Problem Relation Age of Onset    Cancer Mother     Cancer Maternal Aunt     Heart disease Maternal Uncle     Birth defects Maternal Grandmother      Current Outpatient Medications on File Prior to Visit   Medication Sig Dispense Refill    albuterol (VENTOLIN HFA) 90 mcg/actuation inhaler Inhale 2 puffs into the lungs every 6 (six) hours as needed for Wheezing. Rescue 18 g 5    amitriptyline (ELAVIL) 25 MG tablet Take 25 mg by mouth every evening.      aspirin (ECOTRIN) 81 MG EC tablet Take 1 tablet (81 mg total) by mouth once daily. 30 tablet 11    budesonide-glycopyr-formoterol 160-9-4.8 mcg/actuation HFAA Inhale 2 puffs into the lungs 2 (two) times daily. 10.7 g 5    buPROPion (WELLBUTRIN SR) 150 MG TBSR 12 hr tablet Take 1 tablet (150 mg total) by mouth 2 (two) times daily. 180 tablet 1    clopidogreL (PLAVIX) 75 mg tablet Take 1 tablet (75 mg total) by mouth once daily. 30 tablet 11    diclofenac sodium (SOLARAZE) 3 % gel Apply topically 2 (two) times daily.      DULoxetine (CYMBALTA) 60 MG capsule Take 1 capsule (60 mg total) by mouth once daily. 90 capsule 1    hydroCHLOROthiazide (HYDRODIURIL) 12.5 MG Tab TAKE 1 TABLET BY MOUTH EVERY DAY 30 tablet 5     No current facility-administered medications on file prior to visit.     Immunization History   Administered Date(s) Administered    COVID-19, MRNA, LN-S, PF (MODERNA FULL 0.5 ML DOSE) 03/09/2021, 04/06/2021,  01/19/2022       Review of Systems   Constitutional:  Positive for malaise/fatigue. Negative for fever and weight loss.   Respiratory:  Positive for shortness of breath.    Cardiovascular:  Negative for chest pain and palpitations.   Gastrointestinal:  Negative for nausea and vomiting.   Psychiatric/Behavioral:  Negative for depression.         Vitals:    10/26/23 1456   BP: 138/82   Pulse: 78   Resp: 18   Temp: 98.2 °F (36.8 °C)       Physical Exam  Vitals reviewed.   Constitutional:       Appearance: Normal appearance.   HENT:      Head: Normocephalic.   Eyes:      Extraocular Movements: Extraocular movements intact.      Conjunctiva/sclera: Conjunctivae normal.      Pupils: Pupils are equal, round, and reactive to light.   Neck:      Thyroid: No thyroid mass or thyromegaly.   Cardiovascular:      Rate and Rhythm: Normal rate and regular rhythm.      Heart sounds: Normal heart sounds. No murmur heard.     No gallop.   Pulmonary:      Effort: Pulmonary effort is normal. No respiratory distress.      Breath sounds: Decreased air movement present. Decreased breath sounds present. No wheezing, rhonchi or rales.   Skin:     General: Skin is warm and dry.      Coloration: Skin is not jaundiced or pale.   Neurological:      Mental Status: He is alert.   Psychiatric:         Mood and Affect: Mood normal.         Behavior: Behavior normal.         Thought Content: Thought content normal.         Judgment: Judgment normal.          Assessment and Plan  1. Smoking             Return to clinic in 6 months or as needed.  Patient will need his low-dose CT scan.    Health Maintenance Topics with due status: Not Due       Topic Last Completion Date    [facility] Lung Screen 04/20/2023

## 2023-11-01 PROBLEM — Z12.2 SCREENING FOR LUNG CANCER: Status: ACTIVE | Noted: 2023-01-01

## 2023-11-01 NOTE — PROGRESS NOTES
Subjective:       Patient ID: Dereck Delgado is a 63 y.o. male.    Chief Complaint: Follow-up, COPD, and Shortness of Breath    Follow-up  This is a chronic problem. The current episode started more than 1 month ago. The problem has been unchanged. Pertinent negatives include no abdominal pain, arthralgias, chest pain, chills, congestion, headaches or rash.   COPD  Pertinent negatives include no abdominal pain, arthralgias, chest pain, chills, congestion, headaches or rash.   Shortness of Breath  Pertinent negatives include no abdominal pain, chest pain, ear pain, headaches or rash. His past medical history is significant for COPD.     Past Medical History:   Diagnosis Date    COPD (chronic obstructive pulmonary disease)     CVA (cerebral vascular accident)     Depression      Past Surgical History:   Procedure Laterality Date    EYE SURGERY       Family History   Problem Relation Age of Onset    Cancer Mother     Cancer Maternal Aunt     Heart disease Maternal Uncle     Birth defects Maternal Grandmother      Review of patient's allergies indicates:  No Known Allergies   Social History     Tobacco Use    Smoking status: Every Day     Current packs/day: 0.75     Average packs/day: 0.8 packs/day for 53.0 years (39.8 ttl pk-yrs)     Types: Cigarettes     Passive exposure: Current    Smokeless tobacco: Former     Types: Snuff, Chew   Substance Use Topics    Alcohol use: Yes     Comment: daily; 3 beers a day; 3 shots of liquor a day    Drug use: Yes     Types: Marijuana     Comment: 3 times daily      Review of Systems   Constitutional:  Negative for chills, activity change and night sweats.   HENT:  Negative for congestion and ear pain.    Eyes:  Negative for redness and itching.   Respiratory:  Positive for shortness of breath.    Cardiovascular:  Negative for chest pain and palpitations.   Musculoskeletal:  Negative for arthralgias and back pain.   Skin:  Negative for rash.   Gastrointestinal:  Negative for  "abdominal pain and abdominal distention.   Neurological:  Negative for dizziness and headaches.   Hematological:  Negative for adenopathy. Does not bruise/bleed easily.   Psychiatric/Behavioral:  Negative for confusion. The patient is not nervous/anxious.        Objective:      Physical Exam   Constitutional: He is oriented to person, place, and time. He appears well-developed and well-nourished.   HENT:   Head: Normocephalic.   Nose: Nose normal.   Mouth/Throat: Oropharynx is clear and moist.   Neck: No JVD present. No thyromegaly present.   Cardiovascular: Normal rate, regular rhythm, normal heart sounds and intact distal pulses.   Pulmonary/Chest: Normal expansion, hyperinflation, symmetric chest wall expansion, effort normal and breath sounds normal.   Abdominal: Soft. Bowel sounds are normal.   Musculoskeletal:         General: Normal range of motion.      Cervical back: Normal range of motion and neck supple.   Lymphadenopathy: No supraclavicular adenopathy is present.     He has no cervical adenopathy.   Neurological: He is alert and oriented to person, place, and time. He has normal reflexes.   Skin: Skin is warm and dry.   Psychiatric: He has a normal mood and affect. His behavior is normal.     Personal Diagnostic Review  none pertinent        11/1/2023     1:53 PM 10/26/2023     2:56 PM 7/27/2023     3:15 PM 5/25/2023     3:14 PM 5/3/2023     1:23 PM 5/3/2023    10:52 AM 4/27/2023     1:17 PM   Pulmonary Function Tests   SpO2 95 % 96 % 93 %  99 % 99 % 93 %   Height 5' 7" (1.702 m) 5' 7" (1.702 m) 5' 7" (1.702 m) 5' 7" (1.702 m)  5' 7" (1.702 m) 5' 7" (1.702 m)   Weight 40.4 kg (89 lb) 40.4 kg (89 lb) 42.6 kg (94 lb) 40.8 kg (90 lb)  40.8 kg (90 lb) 41.3 kg (91 lb)   BMI (Calculated) 13.9 13.9 14.7 14.1  14.1 14.2         Assessment:       1. Chronic obstructive pulmonary disease, unspecified COPD type    2. Tobacco abuse disorder    3. Screening for lung cancer        Outpatient Encounter Medications as " of 11/1/2023   Medication Sig Dispense Refill    albuterol (VENTOLIN HFA) 90 mcg/actuation inhaler Inhale 2 puffs into the lungs every 6 (six) hours as needed for Wheezing. Rescue 18 g 5    amitriptyline (ELAVIL) 25 MG tablet Take 25 mg by mouth every evening.      aspirin (ECOTRIN) 81 MG EC tablet Take 1 tablet (81 mg total) by mouth once daily. 30 tablet 11    budesonide-glycopyr-formoterol 160-9-4.8 mcg/actuation HFAA Inhale 2 puffs into the lungs 2 (two) times daily. 10.7 g 5    buPROPion (WELLBUTRIN SR) 150 MG TBSR 12 hr tablet Take 1 tablet (150 mg total) by mouth 2 (two) times daily. 180 tablet 1    clopidogreL (PLAVIX) 75 mg tablet Take 1 tablet (75 mg total) by mouth once daily. 30 tablet 11    diclofenac sodium (SOLARAZE) 3 % gel Apply topically 2 (two) times daily.      DULoxetine (CYMBALTA) 60 MG capsule Take 1 capsule (60 mg total) by mouth once daily. 90 capsule 1    hydroCHLOROthiazide (HYDRODIURIL) 12.5 MG Tab TAKE 1 TABLET BY MOUTH EVERY DAY 30 tablet 5     No facility-administered encounter medications on file as of 11/1/2023.     No orders of the defined types were placed in this encounter.      Plan:       Problem List Items Addressed This Visit          Pulmonary    Chronic obstructive pulmonary disease     Patient has COPD which is currently stable using Trelegy and Ventolin         Screening for lung cancer     He is due for study April 2024 already scheduled agrees to the risk versus benefit wants to proceed            Other    Tobacco abuse disorder     Has been placed on Wellbutrin for smoking cessation

## 2023-11-01 NOTE — ASSESSMENT & PLAN NOTE
He is due for study April 2024 already scheduled agrees to the risk versus benefit wants to proceed   [General Appearance - In No Acute Distress] : no acute distress [Auscultation Breath Sounds / Voice Sounds] : lungs were clear to auscultation bilaterally [FreeTextEntry1] : 2/6 BRICE at base; 1+ edema of LE's, L>R

## 2024-01-01 ENCOUNTER — TELEPHONE (OUTPATIENT)
Dept: EMERGENCY MEDICINE | Facility: HOSPITAL | Age: 64
End: 2024-01-01
Payer: MEDICARE

## 2024-01-01 ENCOUNTER — HOSPITAL ENCOUNTER (INPATIENT)
Facility: HOSPITAL | Age: 64
LOS: 1 days | Discharge: HOSPICE | DRG: 190 | End: 2024-03-21
Attending: FAMILY MEDICINE | Admitting: INTERNAL MEDICINE
Payer: MEDICARE

## 2024-01-01 ENCOUNTER — OFFICE VISIT (OUTPATIENT)
Dept: CARDIOLOGY | Facility: CLINIC | Age: 64
End: 2024-01-01
Payer: MEDICARE

## 2024-01-01 ENCOUNTER — OFFICE VISIT (OUTPATIENT)
Dept: FAMILY MEDICINE | Facility: CLINIC | Age: 64
End: 2024-01-01
Payer: MEDICARE

## 2024-01-01 ENCOUNTER — HOSPITAL ENCOUNTER (EMERGENCY)
Facility: HOSPITAL | Age: 64
Discharge: HOME | End: 2024-03-14
Attending: FAMILY MEDICINE
Payer: MEDICARE

## 2024-01-01 ENCOUNTER — OFFICE VISIT (OUTPATIENT)
Dept: CARDIOLOGY | Facility: CLINIC | Age: 64
End: 2024-01-01
Payer: MEDICAID

## 2024-01-01 ENCOUNTER — HOSPITAL ENCOUNTER (EMERGENCY)
Facility: HOSPITAL | Age: 64
Discharge: HOME | End: 2024-03-09
Attending: EMERGENCY MEDICINE
Payer: MEDICARE

## 2024-01-01 ENCOUNTER — PATIENT OUTREACH (OUTPATIENT)
Dept: ADMINISTRATIVE | Facility: CLINIC | Age: 64
End: 2024-01-01

## 2024-01-01 VITALS
OXYGEN SATURATION: 96 % | WEIGHT: 80.69 LBS | DIASTOLIC BLOOD PRESSURE: 79 MMHG | HEIGHT: 67 IN | BODY MASS INDEX: 12.66 KG/M2 | HEART RATE: 94 BPM | SYSTOLIC BLOOD PRESSURE: 128 MMHG | RESPIRATION RATE: 20 BRPM | TEMPERATURE: 97 F

## 2024-01-01 VITALS
HEIGHT: 67 IN | SYSTOLIC BLOOD PRESSURE: 112 MMHG | OXYGEN SATURATION: 84 % | HEART RATE: 100 BPM | RESPIRATION RATE: 19 BRPM | DIASTOLIC BLOOD PRESSURE: 86 MMHG | BODY MASS INDEX: 12.56 KG/M2 | SYSTOLIC BLOOD PRESSURE: 122 MMHG | DIASTOLIC BLOOD PRESSURE: 72 MMHG | HEIGHT: 67 IN | WEIGHT: 80 LBS | OXYGEN SATURATION: 96 % | HEART RATE: 99 BPM | BODY MASS INDEX: 12.53 KG/M2

## 2024-01-01 VITALS
RESPIRATION RATE: 17 BRPM | WEIGHT: 80 LBS | DIASTOLIC BLOOD PRESSURE: 84 MMHG | HEART RATE: 84 BPM | HEIGHT: 67 IN | OXYGEN SATURATION: 97 % | BODY MASS INDEX: 12.56 KG/M2 | SYSTOLIC BLOOD PRESSURE: 127 MMHG | TEMPERATURE: 97 F

## 2024-01-01 VITALS
HEART RATE: 82 BPM | BODY MASS INDEX: 14.75 KG/M2 | SYSTOLIC BLOOD PRESSURE: 136 MMHG | HEIGHT: 67 IN | WEIGHT: 94 LBS | OXYGEN SATURATION: 95 % | DIASTOLIC BLOOD PRESSURE: 80 MMHG

## 2024-01-01 VITALS
OXYGEN SATURATION: 83 % | HEIGHT: 67 IN | TEMPERATURE: 98 F | RESPIRATION RATE: 20 BRPM | DIASTOLIC BLOOD PRESSURE: 91 MMHG | BODY MASS INDEX: 15.22 KG/M2 | SYSTOLIC BLOOD PRESSURE: 148 MMHG | HEART RATE: 89 BPM | WEIGHT: 97 LBS

## 2024-01-01 DIAGNOSIS — R07.9 CHEST PAIN: ICD-10-CM

## 2024-01-01 DIAGNOSIS — R00.2 PALPITATIONS: Primary | ICD-10-CM

## 2024-01-01 DIAGNOSIS — R79.89 ELEVATED BRAIN NATRIURETIC PEPTIDE (BNP) LEVEL: ICD-10-CM

## 2024-01-01 DIAGNOSIS — R06.02 SOB (SHORTNESS OF BREATH): ICD-10-CM

## 2024-01-01 DIAGNOSIS — J44.1 COPD EXACERBATION: Primary | ICD-10-CM

## 2024-01-01 DIAGNOSIS — Z72.0 TOBACCO ABUSE DISORDER: ICD-10-CM

## 2024-01-01 DIAGNOSIS — R00.2 PALPITATIONS: ICD-10-CM

## 2024-01-01 DIAGNOSIS — J44.9 CHRONIC OBSTRUCTIVE PULMONARY DISEASE, UNSPECIFIED COPD TYPE: ICD-10-CM

## 2024-01-01 DIAGNOSIS — I21.A1 TYPE 2 MI (MYOCARDIAL INFARCTION): ICD-10-CM

## 2024-01-01 DIAGNOSIS — I10 ESSENTIAL HYPERTENSION: Primary | ICD-10-CM

## 2024-01-01 DIAGNOSIS — R07.9 CHEST PAIN, UNSPECIFIED TYPE: ICD-10-CM

## 2024-01-01 DIAGNOSIS — R09.02 HYPOXIA: Primary | ICD-10-CM

## 2024-01-01 DIAGNOSIS — J44.9 END STAGE COPD: Primary | ICD-10-CM

## 2024-01-01 DIAGNOSIS — F32.A DEPRESSION, UNSPECIFIED DEPRESSION TYPE: ICD-10-CM

## 2024-01-01 DIAGNOSIS — I73.9 PVD (PERIPHERAL VASCULAR DISEASE): ICD-10-CM

## 2024-01-01 DIAGNOSIS — I10 ESSENTIAL HYPERTENSION: ICD-10-CM

## 2024-01-01 DIAGNOSIS — N52.9 ERECTILE DYSFUNCTION, UNSPECIFIED ERECTILE DYSFUNCTION TYPE: ICD-10-CM

## 2024-01-01 DIAGNOSIS — R42 DIZZINESS: ICD-10-CM

## 2024-01-01 DIAGNOSIS — R94.31 ABNORMAL ECG: ICD-10-CM

## 2024-01-01 LAB
ALBUMIN SERPL BCP-MCNC: 2.7 G/DL (ref 3.5–5)
ALBUMIN SERPL BCP-MCNC: 2.8 G/DL (ref 3.5–5)
ALBUMIN SERPL BCP-MCNC: 2.9 G/DL (ref 3.5–5)
ALBUMIN SERPL BCP-MCNC: 2.9 G/DL (ref 3.5–5)
ALBUMIN SERPL BCP-MCNC: 3 G/DL (ref 3.5–5)
ALBUMIN/GLOB SERPL: 0.6 {RATIO}
ALBUMIN/GLOB SERPL: 0.7 {RATIO}
ALBUMIN/GLOB SERPL: 0.8 {RATIO}
ALP SERPL-CCNC: 102 U/L (ref 45–115)
ALP SERPL-CCNC: 75 U/L (ref 45–115)
ALP SERPL-CCNC: 78 U/L (ref 45–115)
ALP SERPL-CCNC: 78 U/L (ref 45–115)
ALP SERPL-CCNC: 90 U/L (ref 45–115)
ALT SERPL W P-5'-P-CCNC: 61 U/L (ref 16–61)
ALT SERPL W P-5'-P-CCNC: 62 U/L (ref 16–61)
ALT SERPL W P-5'-P-CCNC: 62 U/L (ref 16–61)
ALT SERPL W P-5'-P-CCNC: 67 U/L (ref 16–61)
ALT SERPL W P-5'-P-CCNC: 70 U/L (ref 16–61)
AMMONIA PLAS-SCNC: 10 ΜMOL/L (ref 11–32)
ANION GAP SERPL CALCULATED.3IONS-SCNC: 10 MMOL/L (ref 7–16)
ANION GAP SERPL CALCULATED.3IONS-SCNC: 11 MMOL/L (ref 7–16)
ANION GAP SERPL CALCULATED.3IONS-SCNC: 14 MMOL/L (ref 7–16)
ANION GAP SERPL CALCULATED.3IONS-SCNC: 14 MMOL/L (ref 7–16)
ANION GAP SERPL CALCULATED.3IONS-SCNC: 15 MMOL/L (ref 7–16)
ANION GAP SERPL CALCULATED.3IONS-SCNC: 9 MMOL/L (ref 7–16)
ANION GAP SERPL CALCULATED.3IONS-SCNC: 9 MMOL/L (ref 7–16)
ANISOCYTOSIS BLD QL SMEAR: ABNORMAL
AORTIC ROOT ANNULUS: 2.44 CM
AORTIC VALVE CUSP SEPERATION: 1.55 CM
AST SERPL W P-5'-P-CCNC: 104 U/L (ref 15–37)
AST SERPL W P-5'-P-CCNC: 71 U/L (ref 15–37)
AST SERPL W P-5'-P-CCNC: 71 U/L (ref 15–37)
AST SERPL W P-5'-P-CCNC: 79 U/L (ref 15–37)
AST SERPL W P-5'-P-CCNC: 83 U/L (ref 15–37)
AV INDEX (PROSTH): 0.77
AV MEAN GRADIENT: 1 MMHG
AV PEAK GRADIENT: 3 MMHG
AV VALVE AREA BY VELOCITY RATIO: 1.79 CM²
AV VALVE AREA: 2.28 CM²
AV VELOCITY RATIO: 0.61
BACTERIA #/AREA URNS HPF: ABNORMAL /HPF
BACTERIA BLD CULT: NORMAL
BACTERIA BLD CULT: NORMAL
BASOPHILS # BLD AUTO: 0.01 K/UL (ref 0–0.2)
BASOPHILS # BLD AUTO: 0.01 K/UL (ref 0–0.2)
BASOPHILS # BLD AUTO: 0.02 K/UL (ref 0–0.2)
BASOPHILS # BLD AUTO: 0.03 K/UL (ref 0–0.2)
BASOPHILS NFR BLD AUTO: 0.1 % (ref 0–1)
BILIRUB SERPL-MCNC: 0.7 MG/DL (ref ?–1.2)
BILIRUB SERPL-MCNC: 1 MG/DL (ref ?–1.2)
BILIRUB SERPL-MCNC: 1.2 MG/DL (ref ?–1.2)
BILIRUB UR QL STRIP: NEGATIVE
BUN SERPL-MCNC: 21 MG/DL (ref 7–18)
BUN SERPL-MCNC: 41 MG/DL (ref 7–18)
BUN SERPL-MCNC: 60 MG/DL (ref 7–18)
BUN SERPL-MCNC: 60 MG/DL (ref 7–18)
BUN SERPL-MCNC: 62 MG/DL (ref 7–18)
BUN SERPL-MCNC: 63 MG/DL (ref 7–18)
BUN SERPL-MCNC: 67 MG/DL (ref 7–18)
BUN/CREAT SERPL: 21 (ref 6–20)
BUN/CREAT SERPL: 35 (ref 6–20)
BUN/CREAT SERPL: 36 (ref 6–20)
BUN/CREAT SERPL: 37 (ref 6–20)
BUN/CREAT SERPL: 38 (ref 6–20)
BUN/CREAT SERPL: 41 (ref 6–20)
BUN/CREAT SERPL: 43 (ref 6–20)
CALCIUM SERPL-MCNC: 8.6 MG/DL (ref 8.5–10.1)
CALCIUM SERPL-MCNC: 8.8 MG/DL (ref 8.5–10.1)
CALCIUM SERPL-MCNC: 9.1 MG/DL (ref 8.5–10.1)
CALCIUM SERPL-MCNC: 9.3 MG/DL (ref 8.5–10.1)
CALCIUM SERPL-MCNC: 9.4 MG/DL (ref 8.5–10.1)
CALCIUM SERPL-MCNC: 9.8 MG/DL (ref 8.5–10.1)
CALCIUM SERPL-MCNC: 9.9 MG/DL (ref 8.5–10.1)
CHLORIDE SERPL-SCNC: 81 MMOL/L (ref 98–107)
CHLORIDE SERPL-SCNC: 81 MMOL/L (ref 98–107)
CHLORIDE SERPL-SCNC: 84 MMOL/L (ref 98–107)
CHLORIDE SERPL-SCNC: 86 MMOL/L (ref 98–107)
CHLORIDE SERPL-SCNC: 87 MMOL/L (ref 98–107)
CHLORIDE SERPL-SCNC: 89 MMOL/L (ref 98–107)
CHLORIDE SERPL-SCNC: 92 MMOL/L (ref 98–107)
CLARITY UR: CLEAR
CO2 SERPL-SCNC: 26 MMOL/L (ref 21–32)
CO2 SERPL-SCNC: 30 MMOL/L (ref 21–32)
CO2 SERPL-SCNC: 31 MMOL/L (ref 21–32)
CO2 SERPL-SCNC: 31 MMOL/L (ref 21–32)
CO2 SERPL-SCNC: 35 MMOL/L (ref 21–32)
CO2 SERPL-SCNC: 36 MMOL/L (ref 21–32)
CO2 SERPL-SCNC: 37 MMOL/L (ref 21–32)
COLOR UR: ABNORMAL
CREAT SERPL-MCNC: 1.01 MG/DL (ref 0.7–1.3)
CREAT SERPL-MCNC: 1.18 MG/DL (ref 0.7–1.3)
CREAT SERPL-MCNC: 1.48 MG/DL (ref 0.7–1.3)
CREAT SERPL-MCNC: 1.53 MG/DL (ref 0.7–1.3)
CREAT SERPL-MCNC: 1.56 MG/DL (ref 0.7–1.3)
CREAT SERPL-MCNC: 1.66 MG/DL (ref 0.7–1.3)
CREAT SERPL-MCNC: 1.81 MG/DL (ref 0.7–1.3)
CV ECHO LV RWT: 0.46 CM
DIFFERENTIAL METHOD BLD: ABNORMAL
DOP CALC AO PEAK VEL: 0.84 M/S
DOP CALC AO VTI: 14.5 CM
DOP CALC LVOT AREA: 3 CM2
DOP CALC LVOT DIAMETER: 1.94 CM
DOP CALC LVOT PEAK VEL: 0.51 M/S
DOP CALC LVOT STROKE VOLUME: 33.09 CM3
DOP CALCLVOT PEAK VEL VTI: 11.2 CM
E WAVE DECELERATION TIME: 129.47 MSEC
E/A RATIO: 1
E/E' RATIO: 7.73 M/S
ECHO LV POSTERIOR WALL: 0.87 CM (ref 0.6–1.1)
EGFR (NO RACE VARIABLE) (RUSH/TITUS): 41 ML/MIN/1.73M2
EGFR (NO RACE VARIABLE) (RUSH/TITUS): 45 ML/MIN/1.73M2
EGFR (NO RACE VARIABLE) (RUSH/TITUS): 49 ML/MIN/1.73M2
EGFR (NO RACE VARIABLE) (RUSH/TITUS): 50 ML/MIN/1.73M2
EGFR (NO RACE VARIABLE) (RUSH/TITUS): 52 ML/MIN/1.73M2
EGFR (NO RACE VARIABLE) (RUSH/TITUS): 69 ML/MIN/1.73M2
EGFR (NO RACE VARIABLE) (RUSH/TITUS): 83 ML/MIN/1.73M2
EJECTION FRACTION: 50 %
EOSINOPHIL # BLD AUTO: 0 K/UL (ref 0–0.5)
EOSINOPHIL # BLD AUTO: 0.01 K/UL (ref 0–0.5)
EOSINOPHIL # BLD AUTO: 0.01 K/UL (ref 0–0.5)
EOSINOPHIL NFR BLD AUTO: 0 % (ref 1–4)
EOSINOPHIL NFR BLD AUTO: 0.1 % (ref 1–4)
EOSINOPHIL NFR BLD AUTO: 0.1 % (ref 1–4)
ERYTHROCYTE [DISTWIDTH] IN BLOOD BY AUTOMATED COUNT: 13.1 % (ref 11.5–14.5)
ERYTHROCYTE [DISTWIDTH] IN BLOOD BY AUTOMATED COUNT: 13.2 % (ref 11.5–14.5)
ERYTHROCYTE [DISTWIDTH] IN BLOOD BY AUTOMATED COUNT: 13.3 % (ref 11.5–14.5)
ERYTHROCYTE [DISTWIDTH] IN BLOOD BY AUTOMATED COUNT: 13.5 % (ref 11.5–14.5)
ERYTHROCYTE [DISTWIDTH] IN BLOOD BY AUTOMATED COUNT: 13.7 % (ref 11.5–14.5)
ERYTHROCYTE [DISTWIDTH] IN BLOOD BY AUTOMATED COUNT: 13.9 % (ref 11.5–14.5)
FRACTIONAL SHORTENING: 9 % (ref 28–44)
GLOBULIN SER-MCNC: 3.6 G/DL (ref 2–4)
GLOBULIN SER-MCNC: 3.8 G/DL (ref 2–4)
GLOBULIN SER-MCNC: 4 G/DL (ref 2–4)
GLOBULIN SER-MCNC: 4.4 G/DL (ref 2–4)
GLOBULIN SER-MCNC: 4.8 G/DL (ref 2–4)
GLUCOSE SERPL-MCNC: 61 MG/DL (ref 74–106)
GLUCOSE SERPL-MCNC: 70 MG/DL (ref 74–106)
GLUCOSE SERPL-MCNC: 87 MG/DL (ref 74–106)
GLUCOSE SERPL-MCNC: 88 MG/DL (ref 74–106)
GLUCOSE SERPL-MCNC: 96 MG/DL (ref 74–106)
GLUCOSE SERPL-MCNC: 98 MG/DL (ref 74–106)
GLUCOSE SERPL-MCNC: 99 MG/DL (ref 74–106)
GLUCOSE UR STRIP-MCNC: NORMAL MG/DL
HCO3 UR-SCNC: 38.3 MMOL/L (ref 21–28)
HCT VFR BLD AUTO: 39.6 % (ref 40–54)
HCT VFR BLD AUTO: 42.5 % (ref 40–54)
HCT VFR BLD AUTO: 47.4 % (ref 40–54)
HCT VFR BLD AUTO: 47.5 % (ref 40–54)
HCT VFR BLD AUTO: 47.7 % (ref 40–54)
HCT VFR BLD AUTO: 51 % (ref 40–54)
HCT VFR BLD CALC: 51 % (ref 35–51)
HGB BLD-MCNC: 14.4 G/DL (ref 13.5–18)
HGB BLD-MCNC: 14.8 G/DL (ref 13.5–18)
HGB BLD-MCNC: 16.4 G/DL (ref 13.5–18)
HGB BLD-MCNC: 17.1 G/DL (ref 13.5–18)
HGB BLD-MCNC: 17.3 G/DL (ref 13.5–18)
HGB BLD-MCNC: 17.4 G/DL (ref 13.5–18)
HIV 1+O+2 AB SERPL QL: NORMAL
HYALINE CASTS #/AREA URNS LPF: ABNORMAL /LPF
IMM GRANULOCYTES # BLD AUTO: 0.04 K/UL (ref 0–0.04)
IMM GRANULOCYTES # BLD AUTO: 0.05 K/UL (ref 0–0.04)
IMM GRANULOCYTES # BLD AUTO: 0.07 K/UL (ref 0–0.04)
IMM GRANULOCYTES # BLD AUTO: 0.09 K/UL (ref 0–0.04)
IMM GRANULOCYTES # BLD AUTO: 0.09 K/UL (ref 0–0.04)
IMM GRANULOCYTES # BLD AUTO: 0.15 K/UL (ref 0–0.04)
IMM GRANULOCYTES NFR BLD: 0.4 % (ref 0–0.4)
IMM GRANULOCYTES NFR BLD: 0.4 % (ref 0–0.4)
IMM GRANULOCYTES NFR BLD: 0.5 % (ref 0–0.4)
IMM GRANULOCYTES NFR BLD: 0.5 % (ref 0–0.4)
IMM GRANULOCYTES NFR BLD: 0.6 % (ref 0–0.4)
IMM GRANULOCYTES NFR BLD: 0.7 % (ref 0–0.4)
INTERVENTRICULAR SEPTUM: 0.7 CM (ref 0.6–1.1)
IVC DIAMETER: 0.73 CM
KETONES UR STRIP-SCNC: NEGATIVE MG/DL
LA MAJOR: 2.67 CM
LDH SERPL L TO P-CCNC: 1.3 MMOL/L (ref 0.3–1.2)
LEFT ATRIUM SIZE: 2.56 CM
LEFT INTERNAL DIMENSION IN SYSTOLE: 3.41 CM (ref 2.1–4)
LEFT VENTRICLE DIASTOLIC VOLUME: 60.14 ML
LEFT VENTRICLE SYSTOLIC VOLUME: 47.79 ML
LEFT VENTRICULAR INTERNAL DIMENSION IN DIASTOLE: 3.75 CM (ref 3.5–6)
LEFT VENTRICULAR MASS: 82 G
LEUKOCYTE ESTERASE UR QL STRIP: NEGATIVE
LV LATERAL E/E' RATIO: 7.25 M/S
LV SEPTAL E/E' RATIO: 8.29 M/S
LVOT MG: 0.57 MMHG
LVOT MV: 0.36 CM/S
LYMPHOCYTES # BLD AUTO: 0.22 K/UL (ref 1–4.8)
LYMPHOCYTES # BLD AUTO: 0.33 K/UL (ref 1–4.8)
LYMPHOCYTES # BLD AUTO: 0.42 K/UL (ref 1–4.8)
LYMPHOCYTES # BLD AUTO: 0.42 K/UL (ref 1–4.8)
LYMPHOCYTES # BLD AUTO: 0.46 K/UL (ref 1–4.8)
LYMPHOCYTES # BLD AUTO: 0.66 K/UL (ref 1–4.8)
LYMPHOCYTES NFR BLD AUTO: 1.3 % (ref 27–41)
LYMPHOCYTES NFR BLD AUTO: 1.5 % (ref 27–41)
LYMPHOCYTES NFR BLD AUTO: 2.7 % (ref 27–41)
LYMPHOCYTES NFR BLD AUTO: 3 % (ref 27–41)
LYMPHOCYTES NFR BLD AUTO: 3.7 % (ref 27–41)
LYMPHOCYTES NFR BLD AUTO: 8.4 % (ref 27–41)
LYMPHOCYTES NFR BLD MANUAL: 1 % (ref 27–41)
LYMPHOCYTES NFR BLD MANUAL: 4 % (ref 27–41)
MAGNESIUM SERPL-MCNC: 2.1 MG/DL (ref 1.7–2.3)
MAGNESIUM SERPL-MCNC: 2.4 MG/DL (ref 1.7–2.3)
MAYO GENERIC ORDERABLE RESULT: NORMAL
MAYO GENERIC ORDERABLE RESULT: NORMAL
MCH RBC QN AUTO: 32.2 PG (ref 27–31)
MCH RBC QN AUTO: 32.3 PG (ref 27–31)
MCH RBC QN AUTO: 32.5 PG (ref 27–31)
MCHC RBC AUTO-ENTMCNC: 34.1 G/DL (ref 32–36)
MCHC RBC AUTO-ENTMCNC: 34.6 G/DL (ref 32–36)
MCHC RBC AUTO-ENTMCNC: 34.8 G/DL (ref 32–36)
MCHC RBC AUTO-ENTMCNC: 35.8 G/DL (ref 32–36)
MCHC RBC AUTO-ENTMCNC: 36.4 G/DL (ref 32–36)
MCHC RBC AUTO-ENTMCNC: 36.4 G/DL (ref 32–36)
MCV RBC AUTO: 88.8 FL (ref 80–96)
MCV RBC AUTO: 89.1 FL (ref 80–96)
MCV RBC AUTO: 90 FL (ref 80–96)
MCV RBC AUTO: 92.4 FL (ref 80–96)
MCV RBC AUTO: 93.3 FL (ref 80–96)
MCV RBC AUTO: 94.8 FL (ref 80–96)
MONOCYTES # BLD AUTO: 0.75 K/UL (ref 0–0.8)
MONOCYTES # BLD AUTO: 0.84 K/UL (ref 0–0.8)
MONOCYTES # BLD AUTO: 0.97 K/UL (ref 0–0.8)
MONOCYTES # BLD AUTO: 1 K/UL (ref 0–0.8)
MONOCYTES # BLD AUTO: 1.08 K/UL (ref 0–0.8)
MONOCYTES # BLD AUTO: 1.25 K/UL (ref 0–0.8)
MONOCYTES NFR BLD AUTO: 5.6 % (ref 2–6)
MONOCYTES NFR BLD AUTO: 5.9 % (ref 2–6)
MONOCYTES NFR BLD AUTO: 6.2 % (ref 2–6)
MONOCYTES NFR BLD AUTO: 6.9 % (ref 2–6)
MONOCYTES NFR BLD AUTO: 7.5 % (ref 2–6)
MONOCYTES NFR BLD AUTO: 9.6 % (ref 2–6)
MONOCYTES NFR BLD MANUAL: 2 % (ref 2–6)
MONOCYTES NFR BLD MANUAL: 8 % (ref 2–6)
MPC BLD CALC-MCNC: 11.8 FL (ref 9.4–12.4)
MPC BLD CALC-MCNC: 12.5 FL (ref 9.4–12.4)
MPC BLD CALC-MCNC: 13 FL (ref 9.4–12.4)
MPC BLD CALC-MCNC: 13.2 FL (ref 9.4–12.4)
MPC BLD CALC-MCNC: 13.4 FL (ref 9.4–12.4)
MPC BLD CALC-MCNC: 13.7 FL (ref 9.4–12.4)
MUCOUS, UA: ABNORMAL /LPF
MV PEAK A VEL: 0.58 M/S
MV PEAK E VEL: 0.58 M/S
NEUTROPHILS # BLD AUTO: 13.99 K/UL (ref 1.8–7.7)
NEUTROPHILS # BLD AUTO: 14.04 K/UL (ref 1.8–7.7)
NEUTROPHILS # BLD AUTO: 15.56 K/UL (ref 1.8–7.7)
NEUTROPHILS # BLD AUTO: 20.38 K/UL (ref 1.8–7.7)
NEUTROPHILS # BLD AUTO: 6.37 K/UL (ref 1.8–7.7)
NEUTROPHILS # BLD AUTO: 9.91 K/UL (ref 1.8–7.7)
NEUTROPHILS NFR BLD AUTO: 81.4 % (ref 53–65)
NEUTROPHILS NFR BLD AUTO: 88.2 % (ref 53–65)
NEUTROPHILS NFR BLD AUTO: 89.9 % (ref 53–65)
NEUTROPHILS NFR BLD AUTO: 90.1 % (ref 53–65)
NEUTROPHILS NFR BLD AUTO: 92.1 % (ref 53–65)
NEUTROPHILS NFR BLD AUTO: 92.1 % (ref 53–65)
NEUTS BAND NFR BLD MANUAL: 1 % (ref 1–5)
NEUTS SEG NFR BLD MANUAL: 87 % (ref 50–62)
NEUTS SEG NFR BLD MANUAL: 97 % (ref 50–62)
NITRITE UR QL STRIP: NEGATIVE
NRBC # BLD AUTO: 0 X10E3/UL
NRBC, AUTO (.00): 0 %
NT-PROBNP SERPL-MCNC: 1167 PG/ML (ref 1–125)
NT-PROBNP SERPL-MCNC: 1592 PG/ML (ref 1–125)
NT-PROBNP SERPL-MCNC: 1761 PG/ML (ref 1–125)
OHS QRS DURATION: 104 MS
OHS QRS DURATION: 88 MS
OHS QRS DURATION: 94 MS
OHS QRS DURATION: 96 MS
OHS QTC CALCULATION: 448 MS
OHS QTC CALCULATION: 464 MS
OHS QTC CALCULATION: 464 MS
OHS QTC CALCULATION: 469 MS
PCO2 BLDA: 48 MMHG (ref 35–48)
PH SMN: 7.51 [PH] (ref 7.35–7.45)
PH UR STRIP: 5.5 PH UNITS
PHOSPHATE SERPL-MCNC: 3 MG/DL (ref 2.5–4.5)
PISA TR MAX VEL: 2.12 M/S
PLATELET # BLD AUTO: 121 K/UL (ref 150–400)
PLATELET # BLD AUTO: 126 K/UL (ref 150–400)
PLATELET # BLD AUTO: 145 K/UL (ref 150–400)
PLATELET # BLD AUTO: 150 K/UL (ref 150–400)
PLATELET # BLD AUTO: 178 K/UL (ref 150–400)
PLATELET # BLD AUTO: 191 K/UL (ref 150–400)
PLATELET MORPHOLOGY: ABNORMAL
PO2 BLDA: 79 MMHG (ref 83–108)
POC BASE EXCESS: 13.1 MMOL/L (ref -2–3)
POC CO2: 39.8 MMOL/L
POC IONIZED CALCIUM: 1.05 MMOL/L (ref 1.15–1.35)
POC SATURATED O2: 97 % (ref 95–98)
POCT GLUCOSE: 84 MG/DL (ref 60–95)
POTASSIUM BLD-SCNC: 3.7 MMOL/L (ref 3.4–4.5)
POTASSIUM SERPL-SCNC: 3 MMOL/L (ref 3.5–5.1)
POTASSIUM SERPL-SCNC: 3.3 MMOL/L (ref 3.5–5.1)
POTASSIUM SERPL-SCNC: 3.5 MMOL/L (ref 3.5–5.1)
POTASSIUM SERPL-SCNC: 3.6 MMOL/L (ref 3.5–5.1)
POTASSIUM SERPL-SCNC: 3.6 MMOL/L (ref 3.5–5.1)
POTASSIUM SERPL-SCNC: 3.7 MMOL/L (ref 3.5–5.1)
POTASSIUM SERPL-SCNC: 3.9 MMOL/L (ref 3.5–5.1)
PROT SERPL-MCNC: 6.3 G/DL (ref 6.4–8.2)
PROT SERPL-MCNC: 6.6 G/DL (ref 6.4–8.2)
PROT SERPL-MCNC: 6.9 G/DL (ref 6.4–8.2)
PROT SERPL-MCNC: 7.3 G/DL (ref 6.4–8.2)
PROT SERPL-MCNC: 7.8 G/DL (ref 6.4–8.2)
PROT UR QL STRIP: NEGATIVE
PV PEAK GRADIENT: 2 MMHG
PV PEAK VELOCITY: 0.75 M/S
RA MAJOR: 2.55 CM
RA PRESSURE ESTIMATED: 3 MMHG
RBC # BLD AUTO: 4.46 M/UL (ref 4.6–6.2)
RBC # BLD AUTO: 4.6 M/UL (ref 4.6–6.2)
RBC # BLD AUTO: 5.08 M/UL (ref 4.6–6.2)
RBC # BLD AUTO: 5.3 M/UL (ref 4.6–6.2)
RBC # BLD AUTO: 5.33 M/UL (ref 4.6–6.2)
RBC # BLD AUTO: 5.38 M/UL (ref 4.6–6.2)
RBC # UR STRIP: ABNORMAL /UL
RBC #/AREA URNS HPF: 26 /HPF
RBC MORPH BLD: NORMAL
RIGHT VENTRICULAR END-DIASTOLIC DIMENSION: 2.04 CM
RV TB RVSP: 5 MMHG
SARS-COV-2 RDRP RESP QL NAA+PROBE: NEGATIVE
SODIUM BLD-SCNC: 119 MMOL/L (ref 136–145)
SODIUM SERPL-SCNC: 122 MMOL/L (ref 136–145)
SODIUM SERPL-SCNC: 124 MMOL/L (ref 136–145)
SODIUM SERPL-SCNC: 127 MMOL/L (ref 136–145)
SODIUM SERPL-SCNC: 128 MMOL/L (ref 136–145)
SODIUM UR-SCNC: 13 MMOL/L (ref 40–220)
SP GR UR STRIP: 1.01
TARGETS BLD QL SMEAR: ABNORMAL
TDI LATERAL: 0.08 M/S
TDI SEPTAL: 0.07 M/S
TDI: 0.08 M/S
TR MAX PG: 18 MMHG
TRICUSPID ANNULAR PLANE SYSTOLIC EXCURSION: 1.46 CM
TROPONIN I SERPL DL<=0.01 NG/ML-MCNC: 43.1 PG/ML
TROPONIN I SERPL DL<=0.01 NG/ML-MCNC: 62.4 PG/ML
TROPONIN I SERPL DL<=0.01 NG/ML-MCNC: 64.1 PG/ML
TROPONIN I SERPL DL<=0.01 NG/ML-MCNC: 67.1 PG/ML
TROPONIN I SERPL DL<=0.01 NG/ML-MCNC: 68.7 PG/ML
TROPONIN I SERPL DL<=0.01 NG/ML-MCNC: 69.6 PG/ML
TROPONIN I SERPL DL<=0.01 NG/ML-MCNC: 71.3 PG/ML
TV REST PULMONARY ARTERY PRESSURE: 21 MMHG
UROBILINOGEN UR STRIP-ACNC: NORMAL MG/DL
WBC # BLD AUTO: 11.24 K/UL (ref 4.5–11)
WBC # BLD AUTO: 15.53 K/UL (ref 4.5–11)
WBC # BLD AUTO: 15.63 K/UL (ref 4.5–11)
WBC # BLD AUTO: 16.9 K/UL (ref 4.5–11)
WBC # BLD AUTO: 22.14 K/UL (ref 4.5–11)
WBC # BLD AUTO: 7.83 K/UL (ref 4.5–11)
WBC #/AREA URNS HPF: 5 /HPF
YEAST #/AREA URNS HPF: ABNORMAL /HPF

## 2024-01-01 PROCEDURE — 25000242 PHARM REV CODE 250 ALT 637 W/ HCPCS

## 2024-01-01 PROCEDURE — 94668 MNPJ CHEST WALL SBSQ: CPT

## 2024-01-01 PROCEDURE — 84484 ASSAY OF TROPONIN QUANT: CPT | Mod: 91

## 2024-01-01 PROCEDURE — 63600175 PHARM REV CODE 636 W HCPCS: Performed by: INTERNAL MEDICINE

## 2024-01-01 PROCEDURE — 96375 TX/PRO/DX INJ NEW DRUG ADDON: CPT

## 2024-01-01 PROCEDURE — 99285 EMERGENCY DEPT VISIT HI MDM: CPT | Mod: ,,, | Performed by: FAMILY MEDICINE

## 2024-01-01 PROCEDURE — 94640 AIRWAY INHALATION TREATMENT: CPT | Mod: XB

## 2024-01-01 PROCEDURE — 99284 EMERGENCY DEPT VISIT MOD MDM: CPT | Mod: ,,, | Performed by: EMERGENCY MEDICINE

## 2024-01-01 PROCEDURE — 80053 COMPREHEN METABOLIC PANEL: CPT | Performed by: FAMILY MEDICINE

## 2024-01-01 PROCEDURE — 99900035 HC TECH TIME PER 15 MIN (STAT)

## 2024-01-01 PROCEDURE — 83930 ASSAY OF BLOOD OSMOLALITY: CPT | Mod: 90 | Performed by: INTERNAL MEDICINE

## 2024-01-01 PROCEDURE — 25000242 PHARM REV CODE 250 ALT 637 W/ HCPCS: Performed by: INTERNAL MEDICINE

## 2024-01-01 PROCEDURE — 96374 THER/PROPH/DIAG INJ IV PUSH: CPT

## 2024-01-01 PROCEDURE — 99214 OFFICE O/P EST MOD 30 MIN: CPT | Mod: PBBFAC | Performed by: INTERNAL MEDICINE

## 2024-01-01 PROCEDURE — 94761 N-INVAS EAR/PLS OXIMETRY MLT: CPT

## 2024-01-01 PROCEDURE — 85025 COMPLETE CBC W/AUTO DIFF WBC: CPT

## 2024-01-01 PROCEDURE — 1159F MED LIST DOCD IN RCRD: CPT | Mod: ,,, | Performed by: FAMILY MEDICINE

## 2024-01-01 PROCEDURE — 96361 HYDRATE IV INFUSION ADD-ON: CPT

## 2024-01-01 PROCEDURE — 25000003 PHARM REV CODE 250

## 2024-01-01 PROCEDURE — 25000003 PHARM REV CODE 250: Performed by: EMERGENCY MEDICINE

## 2024-01-01 PROCEDURE — 85025 COMPLETE CBC W/AUTO DIFF WBC: CPT | Performed by: INTERNAL MEDICINE

## 2024-01-01 PROCEDURE — 84484 ASSAY OF TROPONIN QUANT: CPT | Performed by: FAMILY MEDICINE

## 2024-01-01 PROCEDURE — 63600175 PHARM REV CODE 636 W HCPCS

## 2024-01-01 PROCEDURE — 3008F BODY MASS INDEX DOCD: CPT | Mod: CPTII,,, | Performed by: INTERNAL MEDICINE

## 2024-01-01 PROCEDURE — 1159F MED LIST DOCD IN RCRD: CPT | Mod: CPTII,,, | Performed by: INTERNAL MEDICINE

## 2024-01-01 PROCEDURE — 1160F RVW MEDS BY RX/DR IN RCRD: CPT | Mod: CPTII,,, | Performed by: INTERNAL MEDICINE

## 2024-01-01 PROCEDURE — 87040 BLOOD CULTURE FOR BACTERIA: CPT | Mod: 91

## 2024-01-01 PROCEDURE — 27000221 HC OXYGEN, UP TO 24 HOURS

## 2024-01-01 PROCEDURE — 63600175 PHARM REV CODE 636 W HCPCS: Performed by: EMERGENCY MEDICINE

## 2024-01-01 PROCEDURE — 93005 ELECTROCARDIOGRAM TRACING: CPT

## 2024-01-01 PROCEDURE — 92610 EVALUATE SWALLOWING FUNCTION: CPT

## 2024-01-01 PROCEDURE — 99233 SBSQ HOSP IP/OBS HIGH 50: CPT | Mod: ,,, | Performed by: INTERNAL MEDICINE

## 2024-01-01 PROCEDURE — 93010 ELECTROCARDIOGRAM REPORT: CPT | Mod: 77,S$PBB,, | Performed by: INTERNAL MEDICINE

## 2024-01-01 PROCEDURE — 96365 THER/PROPH/DIAG IV INF INIT: CPT

## 2024-01-01 PROCEDURE — 3075F SYST BP GE 130 - 139MM HG: CPT | Mod: CPTII,,, | Performed by: INTERNAL MEDICINE

## 2024-01-01 PROCEDURE — 3074F SYST BP LT 130 MM HG: CPT | Mod: ,,, | Performed by: FAMILY MEDICINE

## 2024-01-01 PROCEDURE — 94640 AIRWAY INHALATION TREATMENT: CPT

## 2024-01-01 PROCEDURE — 11000001 HC ACUTE MED/SURG PRIVATE ROOM

## 2024-01-01 PROCEDURE — 85014 HEMATOCRIT: CPT

## 2024-01-01 PROCEDURE — 63700000 PHARM REV CODE 250 ALT 637 W/O HCPCS: Performed by: INTERNAL MEDICINE

## 2024-01-01 PROCEDURE — 63600175 PHARM REV CODE 636 W HCPCS: Performed by: FAMILY MEDICINE

## 2024-01-01 PROCEDURE — 93005 ELECTROCARDIOGRAM TRACING: CPT | Mod: PBBFAC | Performed by: INTERNAL MEDICINE

## 2024-01-01 PROCEDURE — 3008F BODY MASS INDEX DOCD: CPT | Mod: ,,, | Performed by: FAMILY MEDICINE

## 2024-01-01 PROCEDURE — 82140 ASSAY OF AMMONIA: CPT | Performed by: INTERNAL MEDICINE

## 2024-01-01 PROCEDURE — 84484 ASSAY OF TROPONIN QUANT: CPT | Performed by: EMERGENCY MEDICINE

## 2024-01-01 PROCEDURE — 85025 COMPLETE CBC W/AUTO DIFF WBC: CPT | Performed by: EMERGENCY MEDICINE

## 2024-01-01 PROCEDURE — 99214 OFFICE O/P EST MOD 30 MIN: CPT | Mod: S$PBB,,, | Performed by: INTERNAL MEDICINE

## 2024-01-01 PROCEDURE — 80048 BASIC METABOLIC PNL TOTAL CA: CPT | Mod: 91 | Performed by: INTERNAL MEDICINE

## 2024-01-01 PROCEDURE — 51701 INSERT BLADDER CATHETER: CPT

## 2024-01-01 PROCEDURE — 94667 MNPJ CHEST WALL 1ST: CPT

## 2024-01-01 PROCEDURE — 99285 EMERGENCY DEPT VISIT HI MDM: CPT | Mod: 25

## 2024-01-01 PROCEDURE — 83880 ASSAY OF NATRIURETIC PEPTIDE: CPT | Performed by: FAMILY MEDICINE

## 2024-01-01 PROCEDURE — 81003 URINALYSIS AUTO W/O SCOPE: CPT | Performed by: FAMILY MEDICINE

## 2024-01-01 PROCEDURE — 82947 ASSAY GLUCOSE BLOOD QUANT: CPT

## 2024-01-01 PROCEDURE — 84295 ASSAY OF SERUM SODIUM: CPT

## 2024-01-01 PROCEDURE — 96366 THER/PROPH/DIAG IV INF ADDON: CPT

## 2024-01-01 PROCEDURE — 96367 TX/PROPH/DG ADDL SEQ IV INF: CPT

## 2024-01-01 PROCEDURE — 25000003 PHARM REV CODE 250: Performed by: INTERNAL MEDICINE

## 2024-01-01 PROCEDURE — 51798 US URINE CAPACITY MEASURE: CPT

## 2024-01-01 PROCEDURE — 84132 ASSAY OF SERUM POTASSIUM: CPT

## 2024-01-01 PROCEDURE — 99900031 HC PATIENT EDUCATION (STAT)

## 2024-01-01 PROCEDURE — 93010 ELECTROCARDIOGRAM REPORT: CPT | Mod: S$PBB,,, | Performed by: INTERNAL MEDICINE

## 2024-01-01 PROCEDURE — 82330 ASSAY OF CALCIUM: CPT

## 2024-01-01 PROCEDURE — 96372 THER/PROPH/DIAG INJ SC/IM: CPT | Performed by: INTERNAL MEDICINE

## 2024-01-01 PROCEDURE — 96376 TX/PRO/DX INJ SAME DRUG ADON: CPT

## 2024-01-01 PROCEDURE — 84300 ASSAY OF URINE SODIUM: CPT | Performed by: INTERNAL MEDICINE

## 2024-01-01 PROCEDURE — 97166 OT EVAL MOD COMPLEX 45 MIN: CPT

## 2024-01-01 PROCEDURE — 83605 ASSAY OF LACTIC ACID: CPT

## 2024-01-01 PROCEDURE — 80053 COMPREHEN METABOLIC PANEL: CPT | Performed by: EMERGENCY MEDICINE

## 2024-01-01 PROCEDURE — 1160F RVW MEDS BY RX/DR IN RCRD: CPT | Mod: ,,, | Performed by: FAMILY MEDICINE

## 2024-01-01 PROCEDURE — 93010 ELECTROCARDIOGRAM REPORT: CPT | Mod: ,,, | Performed by: HOSPITALIST

## 2024-01-01 PROCEDURE — G0378 HOSPITAL OBSERVATION PER HR: HCPCS

## 2024-01-01 PROCEDURE — 85025 COMPLETE CBC W/AUTO DIFF WBC: CPT | Performed by: FAMILY MEDICINE

## 2024-01-01 PROCEDURE — 25000242 PHARM REV CODE 250 ALT 637 W/ HCPCS: Performed by: FAMILY MEDICINE

## 2024-01-01 PROCEDURE — 87635 SARS-COV-2 COVID-19 AMP PRB: CPT | Performed by: INTERNAL MEDICINE

## 2024-01-01 PROCEDURE — 3078F DIAST BP <80 MM HG: CPT | Mod: ,,, | Performed by: FAMILY MEDICINE

## 2024-01-01 PROCEDURE — 83735 ASSAY OF MAGNESIUM: CPT

## 2024-01-01 PROCEDURE — 96365 THER/PROPH/DIAG IV INF INIT: CPT | Mod: 59

## 2024-01-01 PROCEDURE — 99215 OFFICE O/P EST HI 40 MIN: CPT | Mod: S$PBB,,, | Performed by: INTERNAL MEDICINE

## 2024-01-01 PROCEDURE — 3074F SYST BP LT 130 MM HG: CPT | Mod: CPTII,,, | Performed by: INTERNAL MEDICINE

## 2024-01-01 PROCEDURE — 97161 PT EVAL LOW COMPLEX 20 MIN: CPT

## 2024-01-01 PROCEDURE — 84100 ASSAY OF PHOSPHORUS: CPT

## 2024-01-01 PROCEDURE — 80053 COMPREHEN METABOLIC PANEL: CPT | Performed by: INTERNAL MEDICINE

## 2024-01-01 PROCEDURE — 99223 1ST HOSP IP/OBS HIGH 75: CPT | Mod: AI,,, | Performed by: INTERNAL MEDICINE

## 2024-01-01 PROCEDURE — 87389 HIV-1 AG W/HIV-1&-2 AB AG IA: CPT | Performed by: INTERNAL MEDICINE

## 2024-01-01 PROCEDURE — 25000242 PHARM REV CODE 250 ALT 637 W/ HCPCS: Performed by: EMERGENCY MEDICINE

## 2024-01-01 PROCEDURE — 99212 OFFICE O/P EST SF 10 MIN: CPT | Mod: ,,, | Performed by: FAMILY MEDICINE

## 2024-01-01 PROCEDURE — 93010 ELECTROCARDIOGRAM REPORT: CPT | Mod: ,,, | Performed by: INTERNAL MEDICINE

## 2024-01-01 PROCEDURE — 83935 ASSAY OF URINE OSMOLALITY: CPT | Mod: 90 | Performed by: INTERNAL MEDICINE

## 2024-01-01 PROCEDURE — 82803 BLOOD GASES ANY COMBINATION: CPT

## 2024-01-01 PROCEDURE — 3079F DIAST BP 80-89 MM HG: CPT | Mod: CPTII,,, | Performed by: INTERNAL MEDICINE

## 2024-01-01 PROCEDURE — 83735 ASSAY OF MAGNESIUM: CPT | Performed by: INTERNAL MEDICINE

## 2024-01-01 PROCEDURE — 83880 ASSAY OF NATRIURETIC PEPTIDE: CPT | Performed by: EMERGENCY MEDICINE

## 2024-01-01 PROCEDURE — 1111F DSCHRG MED/CURRENT MED MERGE: CPT | Mod: CPTII,,, | Performed by: INTERNAL MEDICINE

## 2024-01-01 PROCEDURE — 99239 HOSP IP/OBS DSCHRG MGMT >30: CPT | Mod: ,,, | Performed by: INTERNAL MEDICINE

## 2024-01-01 PROCEDURE — 80048 BASIC METABOLIC PNL TOTAL CA: CPT | Performed by: INTERNAL MEDICINE

## 2024-01-01 RX ORDER — ALBUTEROL SULFATE 90 UG/1
2 AEROSOL, METERED RESPIRATORY (INHALATION) EVERY 6 HOURS PRN
Status: DISCONTINUED | OUTPATIENT
Start: 2024-01-01 | End: 2024-01-01

## 2024-01-01 RX ORDER — ACETAMINOPHEN 325 MG/1
650 TABLET ORAL EVERY 4 HOURS PRN
Status: DISCONTINUED | OUTPATIENT
Start: 2024-01-01 | End: 2024-01-01 | Stop reason: HOSPADM

## 2024-01-01 RX ORDER — CLINDAMYCIN HYDROCHLORIDE 300 MG/1
300 CAPSULE ORAL 2 TIMES DAILY
Qty: 20 CAPSULE | Refills: 0 | Status: ON HOLD | OUTPATIENT
Start: 2024-01-01 | End: 2024-01-01

## 2024-01-01 RX ORDER — AZITHROMYCIN 500 MG/1
500 TABLET, FILM COATED ORAL DAILY
Qty: 5 TABLET | Refills: 0 | Status: SHIPPED | OUTPATIENT
Start: 2024-01-01 | End: 2024-01-01

## 2024-01-01 RX ORDER — LEVALBUTEROL INHALATION SOLUTION 1.25 MG/3ML
1.25 SOLUTION RESPIRATORY (INHALATION)
Status: COMPLETED | OUTPATIENT
Start: 2024-01-01 | End: 2024-01-01

## 2024-01-01 RX ORDER — BUDESONIDE 0.5 MG/2ML
0.5 INHALANT ORAL ONCE
Status: COMPLETED | OUTPATIENT
Start: 2024-01-01 | End: 2024-01-01

## 2024-01-01 RX ORDER — HEPARIN SODIUM 5000 [USP'U]/ML
5000 INJECTION, SOLUTION INTRAVENOUS; SUBCUTANEOUS EVERY 8 HOURS
Status: DISCONTINUED | OUTPATIENT
Start: 2024-01-01 | End: 2024-01-01 | Stop reason: HOSPADM

## 2024-01-01 RX ORDER — PREDNISONE 10 MG/1
40 TABLET ORAL DAILY
Status: DISCONTINUED | OUTPATIENT
Start: 2024-01-01 | End: 2024-01-01

## 2024-01-01 RX ORDER — METHYLPREDNISOLONE 4 MG/1
TABLET ORAL
Qty: 21 EACH | Refills: 0 | Status: ON HOLD | OUTPATIENT
Start: 2024-01-01 | End: 2024-01-01

## 2024-01-01 RX ORDER — SODIUM CHLORIDE 0.9 % (FLUSH) 0.9 %
3 SYRINGE (ML) INJECTION
Status: DISCONTINUED | OUTPATIENT
Start: 2024-01-01 | End: 2024-01-01 | Stop reason: HOSPADM

## 2024-01-01 RX ORDER — POTASSIUM CHLORIDE 20 MEQ/1
40 TABLET, EXTENDED RELEASE ORAL
Status: COMPLETED | OUTPATIENT
Start: 2024-01-01 | End: 2024-01-01

## 2024-01-01 RX ORDER — FLUCONAZOLE 100 MG/1
100 TABLET ORAL DAILY
Status: DISCONTINUED | OUTPATIENT
Start: 2024-01-01 | End: 2024-01-01 | Stop reason: HOSPADM

## 2024-01-01 RX ORDER — LORAZEPAM 2 MG/ML
2 INJECTION INTRAMUSCULAR
Status: DISCONTINUED | OUTPATIENT
Start: 2024-01-01 | End: 2024-01-01 | Stop reason: HOSPADM

## 2024-01-01 RX ORDER — ALBUTEROL SULFATE 0.83 MG/ML
2.5 SOLUTION RESPIRATORY (INHALATION) EVERY 6 HOURS PRN
Status: DISCONTINUED | OUTPATIENT
Start: 2024-01-01 | End: 2024-01-01

## 2024-01-01 RX ORDER — IPRATROPIUM BROMIDE AND ALBUTEROL SULFATE 2.5; .5 MG/3ML; MG/3ML
3 SOLUTION RESPIRATORY (INHALATION)
Status: DISCONTINUED | OUTPATIENT
Start: 2024-01-01 | End: 2024-01-01

## 2024-01-01 RX ORDER — IPRATROPIUM BROMIDE AND ALBUTEROL SULFATE 2.5; .5 MG/3ML; MG/3ML
3 SOLUTION RESPIRATORY (INHALATION)
Status: DISCONTINUED | OUTPATIENT
Start: 2024-01-01 | End: 2024-01-01 | Stop reason: HOSPADM

## 2024-01-01 RX ORDER — SODIUM CHLORIDE 9 MG/ML
INJECTION, SOLUTION INTRAVENOUS CONTINUOUS
Status: DISCONTINUED | OUTPATIENT
Start: 2024-01-01 | End: 2024-01-01

## 2024-01-01 RX ORDER — DEXAMETHASONE SODIUM PHOSPHATE 4 MG/ML
8 INJECTION, SOLUTION INTRA-ARTICULAR; INTRALESIONAL; INTRAMUSCULAR; INTRAVENOUS; SOFT TISSUE
Status: COMPLETED | OUTPATIENT
Start: 2024-01-01 | End: 2024-01-01

## 2024-01-01 RX ORDER — CLOPIDOGREL BISULFATE 75 MG/1
75 TABLET ORAL DAILY
Status: DISCONTINUED | OUTPATIENT
Start: 2024-01-01 | End: 2024-01-01 | Stop reason: HOSPADM

## 2024-01-01 RX ORDER — THIAMINE HCL 100 MG
100 TABLET ORAL EVERY 8 HOURS
Status: DISCONTINUED | OUTPATIENT
Start: 2024-01-01 | End: 2024-01-01 | Stop reason: HOSPADM

## 2024-01-01 RX ORDER — ASPIRIN 81 MG/1
81 TABLET ORAL DAILY
Status: DISCONTINUED | OUTPATIENT
Start: 2024-01-01 | End: 2024-01-01 | Stop reason: HOSPADM

## 2024-01-01 RX ORDER — METHYLPREDNISOLONE SOD SUCC 125 MG
125 VIAL (EA) INJECTION
Status: COMPLETED | OUTPATIENT
Start: 2024-01-01 | End: 2024-01-01

## 2024-01-01 RX ORDER — SILVER SULFADIAZINE 10 G/1000G
CREAM TOPICAL DAILY
Status: DISCONTINUED | OUTPATIENT
Start: 2024-01-01 | End: 2024-01-01 | Stop reason: HOSPADM

## 2024-01-01 RX ORDER — BUPROPION HYDROCHLORIDE 150 MG/1
150 TABLET, EXTENDED RELEASE ORAL 2 TIMES DAILY
Qty: 180 TABLET | Refills: 1 | Status: ON HOLD | OUTPATIENT
Start: 2024-01-01 | End: 2024-01-01

## 2024-01-01 RX ORDER — ALBUTEROL SULFATE 90 UG/1
2 AEROSOL, METERED RESPIRATORY (INHALATION) EVERY 6 HOURS PRN
Status: DISCONTINUED | OUTPATIENT
Start: 2024-01-01 | End: 2024-01-01 | Stop reason: CLARIF

## 2024-01-01 RX ORDER — HYDRALAZINE HYDROCHLORIDE 20 MG/ML
10 INJECTION INTRAMUSCULAR; INTRAVENOUS EVERY 6 HOURS PRN
Status: DISCONTINUED | OUTPATIENT
Start: 2024-01-01 | End: 2024-01-01 | Stop reason: HOSPADM

## 2024-01-01 RX ORDER — BUPROPION HYDROCHLORIDE 150 MG/1
150 TABLET, EXTENDED RELEASE ORAL 2 TIMES DAILY
Status: DISCONTINUED | OUTPATIENT
Start: 2024-01-01 | End: 2024-01-01 | Stop reason: HOSPADM

## 2024-01-01 RX ORDER — TALC
6 POWDER (GRAM) TOPICAL NIGHTLY PRN
Status: DISCONTINUED | OUTPATIENT
Start: 2024-01-01 | End: 2024-01-01 | Stop reason: HOSPADM

## 2024-01-01 RX ORDER — IPRATROPIUM BROMIDE 0.5 MG/2.5ML
0.5 SOLUTION RESPIRATORY (INHALATION) ONCE
Status: COMPLETED | OUTPATIENT
Start: 2024-01-01 | End: 2024-01-01

## 2024-01-01 RX ORDER — AMLODIPINE BESYLATE 10 MG/1
10 TABLET ORAL DAILY
Status: DISCONTINUED | OUTPATIENT
Start: 2024-01-01 | End: 2024-01-01 | Stop reason: HOSPADM

## 2024-01-01 RX ORDER — LIDOCAINE HYDROCHLORIDE 20 MG/ML
5 SOLUTION OROPHARYNGEAL EVERY 6 HOURS PRN
Status: DISCONTINUED | OUTPATIENT
Start: 2024-01-01 | End: 2024-01-01 | Stop reason: HOSPADM

## 2024-01-01 RX ORDER — NYSTATIN 100000 [USP'U]/ML
4 SUSPENSION ORAL 4 TIMES DAILY
Qty: 112 ML | Refills: 0 | Status: SHIPPED | OUTPATIENT
Start: 2024-01-01 | End: 2024-03-28

## 2024-01-01 RX ORDER — PREDNISONE 20 MG/1
20 TABLET ORAL 2 TIMES DAILY
Qty: 10 TABLET | Refills: 0 | Status: SHIPPED | OUTPATIENT
Start: 2024-01-01 | End: 2024-01-01

## 2024-01-01 RX ORDER — FLUCONAZOLE 100 MG/1
200 TABLET ORAL ONCE
Status: COMPLETED | OUTPATIENT
Start: 2024-01-01 | End: 2024-01-01

## 2024-01-01 RX ORDER — IPRATROPIUM BROMIDE AND ALBUTEROL SULFATE 2.5; .5 MG/3ML; MG/3ML
3 SOLUTION RESPIRATORY (INHALATION)
Status: COMPLETED | OUTPATIENT
Start: 2024-01-01 | End: 2024-01-01

## 2024-01-01 RX ORDER — CLOPIDOGREL BISULFATE 75 MG/1
75 TABLET ORAL DAILY
Qty: 90 TABLET | Refills: 1 | Status: ON HOLD | OUTPATIENT
Start: 2024-01-01 | End: 2024-01-01

## 2024-01-01 RX ORDER — ALBUTEROL SULFATE 0.83 MG/ML
2.5 SOLUTION RESPIRATORY (INHALATION) EVERY 4 HOURS PRN
Status: DISCONTINUED | OUTPATIENT
Start: 2024-01-01 | End: 2024-01-01 | Stop reason: HOSPADM

## 2024-01-01 RX ORDER — FOLIC ACID 1 MG/1
1 TABLET ORAL DAILY
Status: DISCONTINUED | OUTPATIENT
Start: 2024-01-01 | End: 2024-01-01 | Stop reason: HOSPADM

## 2024-01-01 RX ORDER — PREDNISONE 20 MG/1
40 TABLET ORAL DAILY
Status: DISCONTINUED | OUTPATIENT
Start: 2024-01-01 | End: 2024-01-01 | Stop reason: HOSPADM

## 2024-01-01 RX ORDER — SODIUM CHLORIDE 0.9 % (FLUSH) 0.9 %
10 SYRINGE (ML) INJECTION
Status: DISCONTINUED | OUTPATIENT
Start: 2024-01-01 | End: 2024-01-01

## 2024-01-01 RX ORDER — BUDESONIDE 0.5 MG/2ML
0.5 INHALANT ORAL EVERY 12 HOURS
Status: DISCONTINUED | OUTPATIENT
Start: 2024-01-01 | End: 2024-01-01 | Stop reason: HOSPADM

## 2024-01-01 RX ORDER — POLYETHYLENE GLYCOL 3350 17 G/17G
17 POWDER, FOR SOLUTION ORAL DAILY PRN
Status: DISCONTINUED | OUTPATIENT
Start: 2024-01-01 | End: 2024-01-01 | Stop reason: HOSPADM

## 2024-01-01 RX ORDER — FUROSEMIDE 20 MG/1
20 TABLET ORAL EVERY OTHER DAY
Qty: 30 TABLET | Refills: 0 | Status: ON HOLD | OUTPATIENT
Start: 2024-01-01 | End: 2024-01-01

## 2024-01-01 RX ADMIN — METHYLPREDNISOLONE SODIUM SUCCINATE 40 MG: 40 INJECTION, POWDER, FOR SOLUTION INTRAMUSCULAR; INTRAVENOUS at 05:03

## 2024-01-01 RX ADMIN — AMLODIPINE BESYLATE 10 MG: 10 TABLET ORAL at 08:03

## 2024-01-01 RX ADMIN — METHYLPREDNISOLONE SODIUM SUCCINATE 125 MG: 125 INJECTION, POWDER, FOR SOLUTION INTRAMUSCULAR; INTRAVENOUS at 02:03

## 2024-01-01 RX ADMIN — PREDNISONE 40 MG: 20 TABLET ORAL at 12:03

## 2024-01-01 RX ADMIN — BUDESONIDE INHALATION 0.5 MG: 0.5 SUSPENSION RESPIRATORY (INHALATION) at 08:03

## 2024-01-01 RX ADMIN — IPRATROPIUM BROMIDE 0.5 MG: 0.5 SOLUTION RESPIRATORY (INHALATION) at 11:03

## 2024-01-01 RX ADMIN — AZITHROMYCIN MONOHYDRATE 500 MG: 500 INJECTION, POWDER, LYOPHILIZED, FOR SOLUTION INTRAVENOUS at 05:03

## 2024-01-01 RX ADMIN — IPRATROPIUM BROMIDE AND ALBUTEROL SULFATE 3 ML: 2.5; .5 SOLUTION RESPIRATORY (INHALATION) at 01:03

## 2024-01-01 RX ADMIN — METHYLPREDNISOLONE SODIUM SUCCINATE 40 MG: 40 INJECTION, POWDER, FOR SOLUTION INTRAMUSCULAR; INTRAVENOUS at 06:03

## 2024-01-01 RX ADMIN — BUDESONIDE INHALATION 0.5 MG: 0.5 SUSPENSION RESPIRATORY (INHALATION) at 11:03

## 2024-01-01 RX ADMIN — SODIUM CHLORIDE: 9 INJECTION, SOLUTION INTRAVENOUS at 05:03

## 2024-01-01 RX ADMIN — METHYLPREDNISOLONE SODIUM SUCCINATE 40 MG: 40 INJECTION, POWDER, FOR SOLUTION INTRAMUSCULAR; INTRAVENOUS at 11:03

## 2024-01-01 RX ADMIN — AZITHROMYCIN MONOHYDRATE 500 MG: 500 INJECTION, POWDER, LYOPHILIZED, FOR SOLUTION INTRAVENOUS at 06:03

## 2024-01-01 RX ADMIN — FLUCONAZOLE 200 MG: 100 TABLET ORAL at 12:03

## 2024-01-01 RX ADMIN — CLOPIDOGREL BISULFATE 75 MG: 75 TABLET ORAL at 08:03

## 2024-01-01 RX ADMIN — Medication 6 MG: at 09:03

## 2024-01-01 RX ADMIN — IPRATROPIUM BROMIDE AND ALBUTEROL SULFATE 3 ML: 2.5; .5 SOLUTION RESPIRATORY (INHALATION) at 07:03

## 2024-01-01 RX ADMIN — THIAMINE HYDROCHLORIDE 500 MG: 100 INJECTION, SOLUTION INTRAMUSCULAR; INTRAVENOUS at 02:03

## 2024-01-01 RX ADMIN — DEXAMETHASONE SODIUM PHOSPHATE 8 MG: 4 INJECTION, SOLUTION INTRA-ARTICULAR; INTRALESIONAL; INTRAMUSCULAR; INTRAVENOUS; SOFT TISSUE at 11:03

## 2024-01-01 RX ADMIN — DEXTROSE MONOHYDRATE 2 G: 5 INJECTION INTRAVENOUS at 04:03

## 2024-01-01 RX ADMIN — ASPIRIN 81 MG: 81 TABLET, COATED ORAL at 08:03

## 2024-01-01 RX ADMIN — BUPROPION HYDROCHLORIDE 150 MG: 150 TABLET, FILM COATED, EXTENDED RELEASE ORAL at 09:03

## 2024-01-01 RX ADMIN — HEPARIN SODIUM 5000 UNITS: 5000 INJECTION, SOLUTION INTRAVENOUS; SUBCUTANEOUS at 02:03

## 2024-01-01 RX ADMIN — BUPROPION HYDROCHLORIDE 150 MG: 150 TABLET, FILM COATED, EXTENDED RELEASE ORAL at 08:03

## 2024-01-01 RX ADMIN — THIAMINE HYDROCHLORIDE 500 MG: 100 INJECTION, SOLUTION INTRAMUSCULAR; INTRAVENOUS at 05:03

## 2024-01-01 RX ADMIN — LORAZEPAM 2 MG: 2 INJECTION, SOLUTION INTRAMUSCULAR; INTRAVENOUS at 09:03

## 2024-01-01 RX ADMIN — IPRATROPIUM BROMIDE AND ALBUTEROL SULFATE 3 ML: 2.5; .5 SOLUTION RESPIRATORY (INHALATION) at 02:03

## 2024-01-01 RX ADMIN — SODIUM CHLORIDE: 9 INJECTION, SOLUTION INTRAVENOUS at 04:03

## 2024-01-01 RX ADMIN — THERA TABS 1 TABLET: TAB at 12:03

## 2024-01-01 RX ADMIN — THIAMINE HYDROCHLORIDE 500 MG: 100 INJECTION, SOLUTION INTRAMUSCULAR; INTRAVENOUS at 09:03

## 2024-01-01 RX ADMIN — POTASSIUM CHLORIDE 40 MEQ: 1500 TABLET, EXTENDED RELEASE ORAL at 11:03

## 2024-01-01 RX ADMIN — IPRATROPIUM BROMIDE AND ALBUTEROL SULFATE 3 ML: 2.5; .5 SOLUTION RESPIRATORY (INHALATION) at 12:03

## 2024-01-01 RX ADMIN — HEPARIN SODIUM 5000 UNITS: 5000 INJECTION, SOLUTION INTRAVENOUS; SUBCUTANEOUS at 09:03

## 2024-01-01 RX ADMIN — IPRATROPIUM BROMIDE AND ALBUTEROL SULFATE 3 ML: 2.5; .5 SOLUTION RESPIRATORY (INHALATION) at 08:03

## 2024-01-01 RX ADMIN — ASCORBIC ACID, VITAMIN A PALMITATE, CHOLECALCIFEROL, THIAMINE HYDROCHLORIDE, RIBOFLAVIN-5 PHOSPHATE SODIUM, PYRIDOXINE HYDROCHLORIDE, NIACINAMIDE, DEXPANTHENOL, ALPHA-TOCOPHEROL ACETATE, VITAMIN K1, FOLIC ACID, BIOTIN, CYANOCOBALAMIN: 200; 3300; 200; 6; 3.6; 6; 40; 15; 10; 150; 600; 60; 5 INJECTION, SOLUTION INTRAVENOUS at 02:03

## 2024-01-01 RX ADMIN — LEVALBUTEROL HYDROCHLORIDE 1.25 MG: 1.25 SOLUTION RESPIRATORY (INHALATION) at 11:03

## 2024-01-01 RX ADMIN — HEPARIN SODIUM 5000 UNITS: 5000 INJECTION, SOLUTION INTRAVENOUS; SUBCUTANEOUS at 06:03

## 2024-01-01 RX ADMIN — CEFTRIAXONE SODIUM 1 G: 1 INJECTION, POWDER, FOR SOLUTION INTRAMUSCULAR; INTRAVENOUS at 05:03

## 2024-01-01 RX ADMIN — CLOPIDOGREL BISULFATE 75 MG: 75 TABLET ORAL at 09:03

## 2024-01-01 RX ADMIN — AMLODIPINE BESYLATE 10 MG: 10 TABLET ORAL at 09:03

## 2024-01-01 RX ADMIN — FOLIC ACID 1 MG: 1 TABLET ORAL at 12:03

## 2024-01-01 RX ADMIN — ASPIRIN 81 MG: 81 TABLET, COATED ORAL at 09:03

## 2024-01-01 RX ADMIN — BUDESONIDE INHALATION 0.5 MG: 0.5 SUSPENSION RESPIRATORY (INHALATION) at 07:03

## 2024-01-01 RX ADMIN — BUDESONIDE INHALATION 0.5 MG: 0.5 SUSPENSION RESPIRATORY (INHALATION) at 09:03

## 2024-01-01 RX ADMIN — IPRATROPIUM BROMIDE AND ALBUTEROL SULFATE 3 ML: 2.5; .5 SOLUTION RESPIRATORY (INHALATION) at 09:03

## 2024-01-08 NOTE — PROGRESS NOTES
Cardiology Clinic Note:    PCP: Jose Manuel Castro MD    REFERRING PHYSICIAN:     CHIEF COMPLAINT: chest pain    HISTORY OF PRESENT ILLNESS:  Dereck Delgado is a 63 y.o. male who presents for evaluation of chest pain, midepigastric, 5/10 at most severe, lasts two to five seconds., resolves spontaneously or with rest, occurs once an month   He is an active smoker at half a pack and three joints a day.  He has not limited activty due to chest pain.  He is feeding his dog, is most strenuous activity.       Review of Systems:  Review of Systems   Constitutional: Positive for malaise/fatigue. Negative for diaphoresis, night sweats and weight gain.   HENT:  Negative for congestion, ear pain, hearing loss, nosebleeds and sore throat.    Eyes:  Positive for blurred vision and vision loss in left eye. Negative for double vision, pain, photophobia and visual disturbance.   Cardiovascular:  Positive for chest pain and palpitations. Negative for claudication, dyspnea on exertion, irregular heartbeat, leg swelling, near-syncope, orthopnea and syncope.        HX PVD, previous stents p[aced in 10/22, stents in bilateral upper legs. Has mild exertional claudication, which had improved after PTA bilat, symptoms have reoccurred .    Respiratory:  Positive for cough, shortness of breath, sleep disturbances due to breathing and snoring. Negative for wheezing.         Has CAROLYN, non-compliant with CPAP which has been prescribed.   Endocrine: Negative for cold intolerance, heat intolerance, polydipsia, polyphagia and polyuria.   Hematologic/Lymphatic: Negative for bleeding problem. Does not bruise/bleed easily.   Skin:  Negative for dry skin, flushing, itching, rash and skin cancer.   Musculoskeletal:  Positive for myalgias. Negative for arthritis, back pain, falls, joint pain, muscle cramps and muscle weakness.   Gastrointestinal:  Negative for abdominal pain, change in bowel habit, constipation, diarrhea, dysphagia, heartburn,  nausea and vomiting.   Genitourinary:  Negative for bladder incontinence, dysuria, flank pain, frequency and nocturia.   Neurological:  Negative for dizziness, focal weakness, headaches, light-headedness, loss of balance, numbness, paresthesias and seizures.   Psychiatric/Behavioral:  Negative for depression, memory loss and substance abuse. The patient is not nervous/anxious.    Allergic/Immunologic: Negative for environmental allergies.          PAST MEDICAL HISTORY:  Past Medical History:   Diagnosis Date    COPD (chronic obstructive pulmonary disease)     CVA (cerebral vascular accident)     Depression    Cataract on right eye  Hypertension: for many years, compliant with neds.     PAST SURGICAL HISTORY:  Past Surgical History:   Procedure Laterality Date    EYE SURGERY         SOCIAL HISTORY:  Social History     Socioeconomic History    Marital status:    Occupational History     Comment: disabled   Tobacco Use    Smoking status: Every Day     Current packs/day: 0.75     Average packs/day: 0.8 packs/day for 53.0 years (39.8 ttl pk-yrs)     Types: Cigarettes     Passive exposure: Current    Smokeless tobacco: Former     Types: Snuff, Chew   Substance and Sexual Activity    Alcohol use: Yes     Comment: daily; 3 beers a day; 3 shots of liquor a day    Drug use: Yes     Types: Marijuana     Comment: 3 times daily    Sexual activity: Never       FAMILY HISTORY:  Family History   Problem Relation Age of Onset    Cancer Mother     Cancer Maternal Aunt     Heart disease Maternal Uncle     Birth defects Maternal Grandmother        ALLERGIES:  Allergies as of 01/08/2024    (No Known Allergies)         MEDICATIONS:  Current Outpatient Medications on File Prior to Visit   Medication Sig Dispense Refill    albuterol (VENTOLIN HFA) 90 mcg/actuation inhaler Inhale 2 puffs into the lungs every 6 (six) hours as needed for Wheezing. Rescue 18 g 5    amitriptyline (ELAVIL) 25 MG tablet Take 25 mg by mouth every evening.    "   aspirin (ECOTRIN) 81 MG EC tablet Take 1 tablet (81 mg total) by mouth once daily. 30 tablet 11    budesonide-glycopyr-formoterol 160-9-4.8 mcg/actuation HFAA Inhale 2 puffs into the lungs 2 (two) times daily. 10.7 g 5    buPROPion (WELLBUTRIN SR) 150 MG TBSR 12 hr tablet Take 1 tablet (150 mg total) by mouth 2 (two) times daily. 180 tablet 1    clopidogreL (PLAVIX) 75 mg tablet Take 1 tablet (75 mg total) by mouth once daily. 30 tablet 11    diclofenac sodium (SOLARAZE) 3 % gel Apply topically 2 (two) times daily.      DULoxetine (CYMBALTA) 60 MG capsule Take 1 capsule (60 mg total) by mouth once daily. 90 capsule 1    hydroCHLOROthiazide (HYDRODIURIL) 12.5 MG Tab Take 1 tablet (12.5 mg total) by mouth once daily. 30 tablet 5     No current facility-administered medications on file prior to visit.          PHYSICAL EXAM:  Blood pressure 136/80, pulse 82, height 5' 7" (1.702 m), weight 42.6 kg (94 lb), SpO2 95 %.  Wt Readings from Last 3 Encounters:   01/08/24 42.6 kg (94 lb)   11/01/23 40.4 kg (89 lb)   10/26/23 40.4 kg (89 lb)      Body mass index is 14.72 kg/m².    Physical Exam  Vitals and nursing note reviewed.   Constitutional:       Appearance: Normal appearance. He is normal weight. He is ill-appearing.   HENT:      Head: Normocephalic and atraumatic.      Right Ear: External ear normal.      Left Ear: External ear normal.   Eyes:      General: No scleral icterus.        Right eye: No discharge.         Left eye: No discharge.      Extraocular Movements: Extraocular movements intact.      Conjunctiva/sclera: Conjunctivae normal.      Pupils: Pupils are equal, round, and reactive to light.   Cardiovascular:      Rate and Rhythm: Normal rate and regular rhythm.      Heart sounds: Normal heart sounds. No murmur heard.     No friction rub. No gallop.      Comments: Decreased pulses bilat lower extremities.   Pulmonary:      Breath sounds: Normal breath sounds. No wheezing, rhonchi or rales.      Comments: " "Breath sounds decreased bilatearlly.   Chest:      Chest wall: No tenderness.   Abdominal:      General: Abdomen is flat. Bowel sounds are normal. There is no distension.      Palpations: Abdomen is soft.      Tenderness: There is no abdominal tenderness. There is no guarding or rebound.   Musculoskeletal:         General: No swelling or tenderness. Normal range of motion.      Cervical back: Normal range of motion and neck supple.   Skin:     General: Skin is warm and dry.      Findings: No erythema or rash.   Neurological:      General: No focal deficit present.      Mental Status: He is alert and oriented to person, place, and time.      Cranial Nerves: No cranial nerve deficit.      Motor: No weakness.      Gait: Gait normal.   Psychiatric:         Mood and Affect: Mood normal.         Behavior: Behavior normal.         Thought Content: Thought content normal.         Judgment: Judgment normal.          LABS REVIEWED:  Lab Results   Component Value Date    WBC 6.77 03/23/2023    RBC 5.81 03/23/2023    HGB 19.2 (H) 03/23/2023    HCT 57.0 (H) 03/23/2023    MCV 98.1 (H) 03/23/2023    MCH 33.0 (H) 03/23/2023    MCHC 33.7 03/23/2023    RDW 14.6 (H) 03/23/2023     03/23/2023    MPV 11.2 03/23/2023    NRBC 0.0 03/23/2023     Lab Results   Component Value Date     03/23/2023    K 4.4 03/23/2023    CL 96 (L) 03/23/2023    CO2 33 (H) 03/23/2023    BUN 10 03/23/2023     Lab Results   Component Value Date    AST 22 03/23/2023    ALT 18 03/23/2023     Lab Results   Component Value Date    GLU 61 (L) 03/23/2023     No results found for: "CHOL", "HDL", "LDL", "TRIG", "CHOLHDL"    CARDIAC STUDIES REVIEWED:    OTHER IMAGING STUDIES REVIEWED:        ASSESSMENT:   Essential hypertension  -     EKG 12-lead; Future    SOB (shortness of breath)    Chronic obstructive pulmonary disease, unspecified COPD type    Abnormal ECG    Palpitations    PVD (peripheral vascular disease)    Tobacco abuse " disorder    Dizziness          PLAN:  1. Chest pain atypical, however multiple risk factors, normal  Lexiscan cardiolye 10/22, continue to monitor,   2. Hypertension: mostly controlled on current meds.   3. Claudication: controlled, following with Dr. Barrera  4. Tobacco abuse, discussed smoking cessation, now on Wellbutrin  5. COPD, with continued tobacco abuse  6. Substance abuse, continues to smoke marijuana against medical advice  7. Bilateral hip secondary to osteoarthritis, limiting activity.   8. CAROLYN: on CPAP, uses most of the time, has appointment next month  9. Anxiety, due to prevous near death experience, stuck in  line x 18 hours, has had issues since mid 20s.   10. ED: follow up with primary care, no cardiac contraindications to sexual activity    Follow up in one year, sooner if symptoms change.

## 2024-03-09 NOTE — ED PROVIDER NOTES
Encounter Date: 3/8/2024    SCRIBE #1 NOTE: I, Luanne Kaur, am scribing for, and in the presence of,  Vladimir Woody MD. I have scribed the entire note.       History     Chief Complaint   Patient presents with    Shortness of Breath     This is a 62 y/o male,who presents to the ED with complaints of COPD exacerbation which started 2-3 weeks ago. He reports he has a known hx of COPD and is a current every day smoker. He states he last smoked a cigarette 3 hours ago. There is no Hx of a chest pain. There are no other complaints/pain in the ED at this time. He has a past medical hx of COPD and CVA.     The history is provided by the patient and a relative. No  was used.     Review of patient's allergies indicates:  No Known Allergies  Past Medical History:   Diagnosis Date    COPD (chronic obstructive pulmonary disease)     CVA (cerebral vascular accident)     Depression     Essential hypertension 07/22/2022    Nicotine dependence     PVD (peripheral vascular disease) 10/20/2022     Past Surgical History:   Procedure Laterality Date    EYE SURGERY       Family History   Problem Relation Age of Onset    Cancer Mother     Cancer Maternal Aunt     Heart disease Maternal Uncle     Birth defects Maternal Grandmother      Social History     Tobacco Use    Smoking status: Every Day     Current packs/day: 0.75     Average packs/day: 0.8 packs/day for 53.0 years (39.8 ttl pk-yrs)     Types: Cigarettes     Passive exposure: Current    Smokeless tobacco: Former     Types: Snuff, Chew   Substance Use Topics    Alcohol use: Yes     Comment: daily; 3 beers a day; 3 shots of liquor a day    Drug use: Yes     Types: Marijuana     Comment: 3 times daily     Review of Systems   Respiratory:  Positive for cough and shortness of breath.    Cardiovascular:  Negative for chest pain.   All other systems reviewed and are negative.      Physical Exam     Initial Vitals [03/08/24 2002]   BP Pulse Resp Temp SpO2    (!) 159/102 89 (!) 22 97.9 °F (36.6 °C) 100 %      MAP       --         Physical Exam    Nursing note and vitals reviewed.  Constitutional: He appears well-developed and well-nourished.   HENT:   Head: Normocephalic and atraumatic.   Eyes: EOM are normal. Pupils are equal, round, and reactive to light.   Neck: Neck supple. No thyromegaly present.   Normal range of motion.  Cardiovascular:  Normal rate, regular rhythm, normal heart sounds and intact distal pulses.           No murmur heard.  Pulmonary/Chest: No respiratory distress. He has no wheezes.   Decreased breath sounds.      Abdominal: Abdomen is soft. Bowel sounds are normal. There is no abdominal tenderness.   Musculoskeletal:         General: No tenderness or edema. Normal range of motion.      Cervical back: Normal range of motion and neck supple.     Lymphadenopathy:     He has no cervical adenopathy.   Neurological: He is alert and oriented to person, place, and time. He has normal strength and normal reflexes. No cranial nerve deficit or sensory deficit. GCS score is 15. GCS eye subscore is 4. GCS verbal subscore is 5. GCS motor subscore is 6.   Skin: Skin is warm and dry. Capillary refill takes less than 2 seconds. No rash noted.   Psychiatric: He has a normal mood and affect.         ED Course   Procedures  Labs Reviewed   COMPREHENSIVE METABOLIC PANEL - Abnormal; Notable for the following components:       Result Value    Sodium 127 (*)     Potassium 3.0 (*)     Chloride 84 (*)     CO2 37 (*)     BUN 21 (*)     BUN/Creatinine Ratio 21 (*)     Albumin 2.9 (*)     Globulin 4.4 (*)     ALT 62 (*)      (*)     All other components within normal limits   TROPONIN I - Abnormal; Notable for the following components:    Troponin I High Sensitivity 68.7 (*)     All other components within normal limits   CBC WITH DIFFERENTIAL - Abnormal; Notable for the following components:    MCH 32.3 (*)     Neutrophils % 81.4 (*)     Lymphocytes % 8.4 (*)      Monocytes % 9.6 (*)     Eosinophils % 0.0 (*)     Immature Granulocytes % 0.5 (*)     Lymphocytes, Absolute 0.66 (*)     All other components within normal limits   TROPONIN I - Abnormal; Notable for the following components:    Troponin I High Sensitivity 71.3 (*)     All other components within normal limits   NT-PRO NATRIURETIC PEPTIDE - Abnormal; Notable for the following components:    ProBNP 1,592 (*)     All other components within normal limits   CBC W/ AUTO DIFFERENTIAL    Narrative:     The following orders were created for panel order CBC auto differential.  Procedure                               Abnormality         Status                     ---------                               -----------         ------                     CBC with Differential[[id number]]        Abnormal            Final result                 Please view results for these tests on the individual orders.        ECG Results              EKG 12-lead (Final result)        Collection Time Result Time QRS Duration OHS QTC Calculation    03/08/24 19:59:28 03/09/24 17:36:14 96 464                     Final result by Interface, Lab In [facility] (03/09/24 17:36:18)                   Narrative:    Test Reason : R06.02,    Vent. Rate : 085 BPM     Atrial Rate : 000 BPM     P-R Int : 130 ms          QRS Dur : 096 ms      QT Int : 420 ms       P-R-T Axes : 081 100 088 degrees     QTc Int : 464 ms    Sinus rhythm  Borderline prolonged QT interval  Possible left atrial abnormality  Rightward axis  Right ventricular hypertrophy  Anterolateral T wave abnormality is nonspecific    Confirmed by Juan Manuel PENALOZA, Gretchen KHANNA ([id number]) on 3/9/2024 5:36:11 PM    Referred By: [email or web address]   SELF           Confirmed By:Gretchen Zambrano MD                                  Imaging Results              X-Ray Chest AP (Final result)  Result time 03/08/24 20:27:21      Final result by Elton Samaniego DO (03/08/24 20:27:21)                   Impression:      Chronic  lung changes, similar. Lungs otherwise clear.      Electronically signed by: Elton Samaniego  Date:    03/08/2024  Time:    20:27               Narrative:    EXAMINATION:  XR CHEST AP PORTABLE    CLINICAL HISTORY:  sob;    TECHNIQUE:  XR CHEST AP PORTABLE    COMPARISON:  3/23/23    FINDINGS:  No lines or tubes.    Chronic lung changes, similar.  Lungs otherwise clear.    Normal pleura.    Cardiac silhouette is similar to comparison exam.    No obvious acute bone findings.                                       Medications   potassium chloride SA CR tablet 40 mEq (40 mEq Oral Given 3/8/24 2328)   levalbuterol nebulizer solution 1.25 mg (1.25 mg Nebulization Given 3/8/24 2330)   ipratropium 0.02 % nebulizer solution 0.5 mg (0.5 mg Nebulization Given 3/8/24 2331)   budesonide nebulizer solution 0.5 mg (0.5 mg Nebulization Given 3/8/24 2328)   dexAMETHasone injection 8 mg (8 mg Intravenous Given 3/8/24 2327)     Medical Decision Making  Amount and/or Complexity of Data Reviewed  Labs: ordered.  Radiology: ordered.    Risk  Prescription drug management.              Attending Attestation:           Physician Attestation for Scribe:  Physician Attestation Statement for Scribe #1: I, Vladimir Woody MD, reviewed documentation, as scribed by Radha Kaur in my presence, and it is both accurate and complete.             ED Course as of 03/10/24 0150   Sat Mar 09, 2024   0000 MDM:  A 63-year-old smoker came with increasing shortness of breath.  Physical examination revealed bilateral rhonchi and rales.  Differential diagnosis :  COPD exacerbation, pneumonia, bronchitis, congestive heart failure. [HK]      ED Course User Index  [HK] Vladimir Woody MD                           Clinical Impression:  Final diagnoses:  [R06.02] SOB (shortness of breath)  [J44.1] COPD exacerbation (Primary)          ED Disposition Condition    Discharge Stable          ED Prescriptions       Medication Sig Dispense Start Date End Date  Auth. Provider    predniSONE (DELTASONE) 20 MG tablet Take 1 tablet (20 mg total) by mouth 2 (two) times daily. for 5 days 10 tablet 3/9/2024 3/14/2024 Vladimir Woody MD    azithromycin (ZITHROMAX) 500 MG tablet Take 1 tablet (500 mg total) by mouth once daily. for 5 days 5 tablet 3/9/2024 3/14/2024 Vladimir Woody MD    furosemide (LASIX) 20 MG tablet Take 1 tablet (20 mg total) by mouth every other day. 30 tablet 3/9/2024 3/9/2025 Vladimir Woody MD          Follow-up Information       Follow up With Specialties Details Why Contact Info    Jose Manuel Castro MD Family Medicine In 3 days If symptoms worsen [address]  [facility] [place] [address] [phone]               Vladimir Woody MD  03/10/24 0150

## 2024-03-09 NOTE — ED TRIAGE NOTES
Pt presents to ed with c/o having SOB for 2-3 weeks and coughing. States he hasn't went to the doctor because he doesn't like doctors

## 2024-03-13 NOTE — PROGRESS NOTES
Dereck Delgado is a 63 y.o. male seen today for follow-up for in the emergency room for COPD exacerbation as well as evidence of congestive heart failure with a  over 1500.  Patient does continue to smoke but is cutting back to a few cigarettes today only currently.  I have encouraged him strongly to discontinue smoking altogether.  Patient currently is on 2 L of O2 with O2 sats with ambulation in his 70s.  Patient was started on 3 L of O2 here in the office and his O2 sats sitting was 84%.  We discussed a portable O2 concentrator and he need urgent follow-up with his pulmonologist and his cardiologist we will arrange this for him today here in the office.  The patient may be a candidate for a trilogy machine.      Past Medical History:   Diagnosis Date    COPD (chronic obstructive pulmonary disease)     CVA (cerebral vascular accident)     Depression     Essential hypertension 07/22/2022    Nicotine dependence     PVD (peripheral vascular disease) 10/20/2022     Family History   Problem Relation Age of Onset    Cancer Mother     Cancer Maternal Aunt     Heart disease Maternal Uncle     Birth defects Maternal Grandmother      Current Outpatient Medications on File Prior to Visit   Medication Sig Dispense Refill    albuterol (VENTOLIN HFA) 90 mcg/actuation inhaler Inhale 2 puffs into the lungs every 6 (six) hours as needed for Wheezing. Rescue 18 g 5    aspirin (ECOTRIN) 81 MG EC tablet Take 1 tablet (81 mg total) by mouth once daily. 30 tablet 11    azithromycin (ZITHROMAX) 500 MG tablet Take 1 tablet (500 mg total) by mouth once daily. for 5 days 5 tablet 0    buPROPion (WELLBUTRIN SR) 150 MG TBSR 12 hr tablet TAKE 1 TABLET BY MOUTH TWICE A  tablet 1    DULoxetine (CYMBALTA) 60 MG capsule Take 1 capsule (60 mg total) by mouth once daily. 90 capsule 1    furosemide (LASIX) 20 MG tablet Take 1 tablet (20 mg total) by mouth every other day. 30 tablet 0    hydroCHLOROthiazide (HYDRODIURIL) 12.5 MG Tab Take  1 tablet (12.5 mg total) by mouth once daily. 30 tablet 5    predniSONE (DELTASONE) 20 MG tablet Take 1 tablet (20 mg total) by mouth 2 (two) times daily. for 5 days 10 tablet 0    amitriptyline (ELAVIL) 25 MG tablet Take 25 mg by mouth every evening.      budesonide-glycopyr-formoterol 160-9-4.8 mcg/actuation HFAA Inhale 2 puffs into the lungs 2 (two) times daily. 10.7 g 5    clopidogreL (PLAVIX) 75 mg tablet Take 1 tablet (75 mg total) by mouth once daily. 30 tablet 11    diclofenac sodium (SOLARAZE) 3 % gel Apply topically 2 (two) times daily.       No current facility-administered medications on file prior to visit.     Immunization History   Administered Date(s) Administered    COVID-19, MRNA, LN-S, PF (MODERNA FULL 0.5 ML DOSE) 03/09/2021, 04/06/2021, 01/19/2022       Review of Systems   Constitutional:  Positive for malaise/fatigue. Negative for fever and weight loss.   Respiratory:  Positive for cough and shortness of breath.    Cardiovascular:  Negative for chest pain and palpitations.   Gastrointestinal:  Negative for nausea and vomiting.   Neurological:  Positive for weakness.   Psychiatric/Behavioral:  Negative for depression.         Vitals:    03/13/24 1024   BP:    Pulse: 99   Resp:        Physical Exam  Vitals reviewed.   Constitutional:       Appearance: Normal appearance. He is ill-appearing.      Comments: Patient is thin and skeletal in appearance   HENT:      Head: Normocephalic.   Eyes:      Extraocular Movements: Extraocular movements intact.      Conjunctiva/sclera: Conjunctivae normal.      Pupils: Pupils are equal, round, and reactive to light.   Neck:      Thyroid: No thyroid mass or thyromegaly.   Cardiovascular:      Rate and Rhythm: Normal rate and regular rhythm.      Heart sounds: Heart sounds are distant. No murmur heard.     No gallop.   Pulmonary:      Effort: Pulmonary effort is normal. No respiratory distress.      Breath sounds: Decreased air movement present. Decreased breath  sounds present. No wheezing, rhonchi or rales.      Comments: Lung sounds are extremely distant  Skin:     General: Skin is warm and dry.      Coloration: Skin is not jaundiced or pale.   Neurological:      Mental Status: He is alert.   Psychiatric:         Mood and Affect: Mood normal.         Behavior: Behavior normal.         Thought Content: Thought content normal.         Judgment: Judgment normal.          Assessment and Plan  1. End stage COPD             The patient's O2 sat was 58% off of O2 while walking, patient's O2 sat was 82% sitting without oxygen.  Patient's O2 sat on 2 L at rest was 88%        Return to clinic in 6 weeks.  We will request portable O2 concentrator to assist with activities of daily living and ambulation.  Patient will be set up for follow-up with Cardiology and pulmonology in his strongly encouraged to discontinue smoking.    Health Maintenance Topics with due status: Not Due       Topic Last Completion Date    LDCT Lung Screen 04/20/2023

## 2024-03-13 NOTE — Clinical Note
Discussed care gaps with pt. Pt defers lipid panel, colonoscopy and tetanus/flu vaccines at this time. RSV/shingles/covid not available at clinic

## 2024-03-14 NOTE — ED PROVIDER NOTES
Encounter Date: 3/14/2024    SCRIBE #1 NOTE: I, Nancy Gan, am scribing for, and in the presence of,  Carlos Weber DO. I have scribed the entire note.       History     Chief Complaint   Patient presents with    Hypoxia     The pt is a 64 y/o male coming into the ED from Cardiology with complaints of low O2 sats. The pt's brother states that he was just increased to 3 liters of oxygen yesterday during his PCP visit. The brother states that he was told to follow up with cardiology due to the need for more oxygen to keep his sats up. The pt states he does not have any CP but feels some SOB. The pt also presents with a cough in exam. The brother also mentions sore present on both of the pt's feet but states he has no Hx of diabetes. There are no other complaints at this time.    The history is provided by the patient and a relative. No  was used.     Review of patient's allergies indicates:  No Known Allergies  Past Medical History:   Diagnosis Date    COPD (chronic obstructive pulmonary disease)     CVA (cerebral vascular accident)     Depression     Essential hypertension 07/22/2022    Nicotine dependence     PVD (peripheral vascular disease) 10/20/2022     Past Surgical History:   Procedure Laterality Date    EYE SURGERY       Family History   Problem Relation Age of Onset    Cancer Mother     Cancer Maternal Aunt     Heart disease Maternal Uncle     Birth defects Maternal Grandmother      Social History     Tobacco Use    Smoking status: Every Day     Current packs/day: 0.75     Average packs/day: 0.8 packs/day for 53.0 years (39.8 ttl pk-yrs)     Types: Cigarettes     Passive exposure: Current    Smokeless tobacco: Former     Types: Snuff, Chew    Tobacco comments:     Maybe 3 cig since 3/11/24   Substance Use Topics    Alcohol use: Yes     Comment: daily; 3 beers a day; 3 shots of liquor a day    Drug use: Yes     Types: Marijuana     Comment: 3 times daily     Review of Systems    Respiratory:  Positive for cough and shortness of breath.    Cardiovascular:  Negative for chest pain.   All other systems reviewed and are negative.      Physical Exam     Initial Vitals [03/14/24 1144]   BP Pulse Resp Temp SpO2   (!) 143/94 95 20 97 °F (36.1 °C) 98 %      MAP       --         Physical Exam    Nursing note and vitals reviewed.  Constitutional: He appears well-developed and well-nourished. He is not diaphoretic. No distress.   HENT:   Head: Normocephalic and atraumatic.   Nose: Nose normal.   Mouth/Throat: Oropharynx is clear and moist.   Eyes: Conjunctivae and EOM are normal. Pupils are equal, round, and reactive to light. No scleral icterus.   Neck: Neck supple. No JVD present.   Normal range of motion.  Cardiovascular:  Normal rate, regular rhythm and normal heart sounds.     Exam reveals no gallop and no friction rub.       No murmur heard.  Pulmonary/Chest: No stridor. No respiratory distress. He has wheezes. He exhibits no tenderness.   Abdominal: Abdomen is soft. Bowel sounds are normal. He exhibits no distension and no mass. There is no abdominal tenderness. There is no rebound and no guarding.   Musculoskeletal:         General: No tenderness or edema. Normal range of motion.      Cervical back: Normal range of motion and neck supple. Normal.      Thoracic back: Normal.      Lumbar back: Normal.     Lymphadenopathy:     He has no cervical adenopathy.   Neurological: He is alert and oriented to person, place, and time. He has normal strength. No cranial nerve deficit.   Skin: Skin is warm and dry. No rash noted. No pallor.   Psychiatric: He has a normal mood and affect. Thought content normal.         ED Course   Procedures  Labs Reviewed   COMPREHENSIVE METABOLIC PANEL - Abnormal; Notable for the following components:       Result Value    Sodium 127 (*)     Chloride 87 (*)     CO2 35 (*)     BUN 41 (*)     BUN/Creatinine Ratio 35 (*)     Albumin 3.0 (*)     Globulin 4.8 (*)     ALT 67  (*)     AST 71 (*)     All other components within normal limits   TROPONIN I - Abnormal; Notable for the following components:    Troponin I High Sensitivity 62.4 (*)     All other components within normal limits   TROPONIN I - Abnormal; Notable for the following components:    Troponin I High Sensitivity 67.1 (*)     All other components within normal limits   NT-PRO NATRIURETIC PEPTIDE - Abnormal; Notable for the following components:    ProBNP 1,167 (*)     All other components within normal limits   CBC WITH DIFFERENTIAL - Abnormal; Notable for the following components:    WBC 11.24 (*)     MCH 32.5 (*)     MCHC 36.4 (*)     MPV 12.5 (*)     Neutrophils % 88.2 (*)     Lymphocytes % 3.7 (*)     Monocytes % 7.5 (*)     Eosinophils % 0.1 (*)     Neutrophils, Abs 9.91 (*)     Lymphocytes, Absolute 0.42 (*)     Monocytes, Absolute 0.84 (*)     Immature Granulocytes, Absolute 0.05 (*)     All other components within normal limits   MANUAL DIFFERENTIAL - Abnormal; Notable for the following components:    Segmented Neutrophils, Man % 87 (*)     Lymphocytes, Man % 4 (*)     Monocytes, Man % 8 (*)     Platelet Morphology Few Large Platelets (*)     All other components within normal limits   CBC W/ AUTO DIFFERENTIAL    Narrative:     The following orders were created for panel order CBC auto differential.  Procedure                               Abnormality         Status                     ---------                               -----------         ------                     CBC with Differential[[id number]]       Abnormal            Final result               Manual Differential[[id number]]         Abnormal            Final result                 Please view results for these tests on the individual orders.        ECG Results              EKG 12-lead (Final result)        Collection Time Result Time QRS Duration OHS QTC Calculation    03/14/24 12:54:34 03/14/24 22:43:25 64 049                     Final result by  Interface, Lab In [facility] (03/14/24 22:43:30)                   Narrative:    Test Reason : R07.9,    Vent. Rate : 093 BPM     Atrial Rate : 093 BPM     P-R Int : 122 ms          QRS Dur : 094 ms      QT Int : 378 ms       P-R-T Axes : 084 098 085 degrees     QTc Int : 469 ms    Normal sinus rhythm  Biatrial enlargement  Rightward axis  Pulmonary disease pattern  Abnormal ECG  When compared with ECG of 14-MAR-2024 09:46,  ST no longer depressed in Anterior leads  Confirmed by Lam Nickerson MD ([id number]) on 3/14/2024 10:43:23 PM    Referred By: [email or web address]   SELF           Confirmed By:Lam Nickerson MD                                  Imaging Results              X-Ray Chest AP Portable (Final result)  Result time 03/14/24 12:40:31   Procedure changed from X-Ray Chest PA And Lateral     Final result by Elton Samaniego DO (03/14/24 12:40:31)                   Impression:      Chronic lung change, similar. Lungs otherwise clear.      Electronically signed by: Elton Samaniego  Date:    03/14/2024  Time:    12:40               Narrative:    EXAMINATION:  XR CHEST AP PORTABLE    CLINICAL HISTORY:  Chest pain, nonspecific;Chest Pain    TECHNIQUE:  XR CHEST AP PORTABLE    COMPARISON:  3/8/24    FINDINGS:  No lines or tubes.    Moderate chronic lung change, similar.  Lungs otherwise clear.    Normal pleura.    Cardiac silhouette is similar to comparison exam.    No obvious acute bone findings.                                    X-Rays:   Independently Interpreted Readings:   Other Readings:  Xray Chest AP Portable:  Chronic lung change, similar. Lungs otherwise clear.      Medications   albuterol-ipratropium 2.5 mg-0.5 mg/3 mL nebulizer solution 3 mL (3 mLs Nebulization Given 3/14/24 1424)   methylPREDNISolone sodium succinate injection 125 mg (125 mg Intravenous Given 3/14/24 1444)   albuterol-ipratropium 2.5 mg-0.5 mg/3 mL nebulizer solution 3 mL (3 mLs Nebulization Given 3/14/24 1436)     Medical Decision  Making  Amount and/or Complexity of Data Reviewed  Labs: ordered.  Radiology: ordered.    Risk  Prescription drug management.              Attending Attestation:           Physician Attestation for Scribe:  Physician Attestation Statement for Scribe #1: I, Carlos Weber DO, reviewed documentation, as scribed by Nancy Gan in my presence, and it is both accurate and complete.             ED Course as of 03/18/24 0950   Thu Mar 14, 2024   1456 Xray Chest AP Portable:  Chronic lung change, similar. Lungs otherwise clear.   [LP]      ED Course User Index  [LP] Nancy Gan               Medical Decision Making:   Initial Assessment:   Patient in with hypoxia.  Sent from Dr. Skaggs's office for admission.  Differential Diagnosis:   Patient with acute exacerbation of COPD  ED Management:  Follow-up with pulmonology case was discussed with Dr. Graves             Clinical Impression:  Final diagnoses:  [R07.9] Chest pain  [R09.02] Hypoxia (Primary)  [R07.9] Chest pain, unspecified type          ED Disposition Condition    Discharge Stable          ED Prescriptions       Medication Sig Dispense Start Date End Date Auth. Provider    methylPREDNISolone (MEDROL DOSEPACK) 4 mg tablet use as directed 21 each 3/14/2024 4/4/2024 Carlos Weber DO    clindamycin (CLEOCIN) 300 MG capsule Take 1 capsule (300 mg total) by mouth 2 (two) times daily. for 20 days 20 capsule 3/14/2024 4/3/2024 Carlos Weber DO          Follow-up Information    None          Carlos Weber DO  03/18/24 [id number]

## 2024-03-14 NOTE — PROGRESS NOTES
Cardiology Clinic Note:    PCP: Jose Manuel Castro MD    REFERRING PHYSICIAN:     CHIEF COMPLAINT: chest pain    HISTORY OF PRESENT ILLNESS:  Dereck Delgado is a 63 y.o. male who presents for evaluation of shortness of breath, confusion, occurred four days ago, started two days before that with generalized weakness.  Pts brother reports he was confused, out of his head when family transported pt to ER.  He was evaluated in ER, however left AMA.  His brother reports took several hours to get his O2 above 90%.  Since then he has been more short of breath, however confusion has resolved.   He continues to experience pain on right foot, is following with home health and Dr. Diaz.  He reports occasional  chest pain, midepigastric, 5/10 at most severe, lasts two to five seconds, once or twice a month.  He continues smoking although is down 5 cigs a day.,     Review of Systems:  Review of Systems   Constitutional: Positive for malaise/fatigue. Negative for diaphoresis, night sweats and weight gain.   HENT:  Negative for congestion, ear pain, hearing loss, nosebleeds and sore throat.    Eyes:  Positive for blurred vision and vision loss in left eye. Negative for double vision, pain, photophobia and visual disturbance.   Cardiovascular:  Positive for chest pain and palpitations. Negative for claudication, dyspnea on exertion, irregular heartbeat, leg swelling, near-syncope, orthopnea and syncope.        HX PVD, previous stents p[aced in 10/22, stents in bilateral upper legs. Has mild exertional claudication, which had improved after PTA bilat, symptoms have reoccurred .    Respiratory:  Positive for cough, shortness of breath, sleep disturbances due to breathing and snoring. Negative for wheezing.         Has CAROLYN, non-compliant with CPAP which has been prescribed.   Endocrine: Negative for cold intolerance, heat intolerance, polydipsia, polyphagia and polyuria.   Hematologic/Lymphatic: Negative for bleeding problem.  Does not bruise/bleed easily.   Skin:  Negative for dry skin, flushing, itching, rash and skin cancer.   Musculoskeletal:  Positive for myalgias. Negative for arthritis, back pain, falls, joint pain, muscle cramps and muscle weakness.   Gastrointestinal:  Negative for abdominal pain, change in bowel habit, constipation, diarrhea, dysphagia, heartburn, nausea and vomiting.   Genitourinary:  Negative for bladder incontinence, dysuria, flank pain, frequency and nocturia.   Neurological:  Negative for dizziness, focal weakness, headaches, light-headedness, loss of balance, numbness, paresthesias and seizures.   Psychiatric/Behavioral:  Negative for depression, memory loss and substance abuse. The patient is not nervous/anxious.    Allergic/Immunologic: Negative for environmental allergies.          PAST MEDICAL HISTORY:  Past Medical History:   Diagnosis Date    COPD (chronic obstructive pulmonary disease)     CVA (cerebral vascular accident)     Depression     Essential hypertension 07/22/2022    Nicotine dependence     PVD (peripheral vascular disease) 10/20/2022   Cataract on right eye  Hypertension: for many years, compliant with neds.     PAST SURGICAL HISTORY:  Past Surgical History:   Procedure Laterality Date    EYE SURGERY         SOCIAL HISTORY:  Social History     Socioeconomic History    Marital status:    Occupational History     Comment: disabled   Tobacco Use    Smoking status: Every Day     Current packs/day: 0.75     Average packs/day: 0.8 packs/day for 53.0 years (39.8 ttl pk-yrs)     Types: Cigarettes     Passive exposure: Current    Smokeless tobacco: Former     Types: Snuff, Chew    Tobacco comments:     Maybe 3 cig since 3/11/24   Substance and Sexual Activity    Alcohol use: Yes     Comment: daily; 3 beers a day; 3 shots of liquor a day    Drug use: Yes     Types: Marijuana     Comment: 3 times daily    Sexual activity: Never       FAMILY HISTORY:  Family History   Problem Relation Age of  "Onset    Cancer Mother     Cancer Maternal Aunt     Heart disease Maternal Uncle     Birth defects Maternal Grandmother        ALLERGIES:  Allergies as of 03/14/2024    (No Known Allergies)         MEDICATIONS:  Current Outpatient Medications on File Prior to Visit   Medication Sig Dispense Refill    albuterol (VENTOLIN HFA) 90 mcg/actuation inhaler Inhale 2 puffs into the lungs every 6 (six) hours as needed for Wheezing. Rescue 18 g 5    aspirin (ECOTRIN) 81 MG EC tablet Take 1 tablet (81 mg total) by mouth once daily. 30 tablet 11    azithromycin (ZITHROMAX) 500 MG tablet Take 1 tablet (500 mg total) by mouth once daily. for 5 days 5 tablet 0    budesonide-glycopyr-formoterol 160-9-4.8 mcg/actuation HFAA Inhale 2 puffs into the lungs 2 (two) times daily. 10.7 g 5    buPROPion (WELLBUTRIN SR) 150 MG TBSR 12 hr tablet TAKE 1 TABLET BY MOUTH TWICE A  tablet 1    clopidogreL (PLAVIX) 75 mg tablet Take 1 tablet (75 mg total) by mouth once daily. 30 tablet 11    DULoxetine (CYMBALTA) 60 MG capsule Take 1 capsule (60 mg total) by mouth once daily. 90 capsule 1    furosemide (LASIX) 20 MG tablet Take 1 tablet (20 mg total) by mouth every other day. 30 tablet 0    hydroCHLOROthiazide (HYDRODIURIL) 12.5 MG Tab Take 1 tablet (12.5 mg total) by mouth once daily. 30 tablet 5    predniSONE (DELTASONE) 20 MG tablet Take 1 tablet (20 mg total) by mouth 2 (two) times daily. for 5 days 10 tablet 0    amitriptyline (ELAVIL) 25 MG tablet Take 25 mg by mouth every evening.      diclofenac sodium (SOLARAZE) 3 % gel Apply topically 2 (two) times daily.       No current facility-administered medications on file prior to visit.          PHYSICAL EXAM:  Blood pressure 122/86, pulse 100, height 5' 7" (1.702 m), SpO2 96 %.  Wt Readings from Last 3 Encounters:   03/13/24 36.3 kg (80 lb)   03/08/24 44 kg (97 lb)   01/08/24 42.6 kg (94 lb)      Body mass index is 12.53 kg/m².    Physical Exam  Vitals and nursing note reviewed. "   Constitutional:       Appearance: Normal appearance. He is normal weight. He is ill-appearing.   HENT:      Head: Normocephalic and atraumatic.      Right Ear: External ear normal.      Left Ear: External ear normal.   Eyes:      General: No scleral icterus.        Right eye: No discharge.         Left eye: No discharge.      Extraocular Movements: Extraocular movements intact.      Conjunctiva/sclera: Conjunctivae normal.      Pupils: Pupils are equal, round, and reactive to light.   Cardiovascular:      Rate and Rhythm: Normal rate and regular rhythm.      Heart sounds: Normal heart sounds. No murmur heard.     No friction rub. No gallop.      Comments: Decreased pulses bilat lower extremities.   Pulmonary:      Breath sounds: Normal breath sounds. No wheezing, rhonchi or rales.      Comments: Breath sounds decreased bilatearlly.   Chest:      Chest wall: No tenderness.   Abdominal:      General: Abdomen is flat. Bowel sounds are normal. There is no distension.      Palpations: Abdomen is soft.      Tenderness: There is no abdominal tenderness. There is no guarding or rebound.   Musculoskeletal:         General: No swelling or tenderness. Normal range of motion.      Cervical back: Normal range of motion and neck supple.   Skin:     General: Skin is warm and dry.      Findings: No erythema or rash.   Neurological:      General: No focal deficit present.      Mental Status: He is alert and oriented to person, place, and time.      Cranial Nerves: No cranial nerve deficit.      Motor: No weakness.      Gait: Gait normal.   Psychiatric:         Mood and Affect: Mood normal.         Behavior: Behavior normal.         Thought Content: Thought content normal.         Judgment: Judgment normal.          LABS REVIEWED:  Lab Results   Component Value Date    WBC 7.83 03/08/2024    RBC 5.30 03/08/2024    HGB 17.1 03/08/2024    HCT 47.7 03/08/2024    MCV 90.0 03/08/2024    MCH 32.3 (H) 03/08/2024    MCHC 35.8 03/08/2024     "RDW 13.2 03/08/2024     03/08/2024    MPV 11.8 03/08/2024    NRBC 0.0 03/08/2024     Lab Results   Component Value Date     (L) 03/08/2024    K 3.0 (L) 03/08/2024    CL 84 (L) 03/08/2024    CO2 37 (H) 03/08/2024    BUN 21 (H) 03/08/2024     Lab Results   Component Value Date     (H) 03/08/2024    ALT 62 (H) 03/08/2024     Lab Results   Component Value Date    GLU 98 03/08/2024     No results found for: "CHOL", "HDL", "LDL", "TRIG", "CHOLHDL"    CARDIAC STUDIES REVIEWED:    OTHER IMAGING STUDIES REVIEWED:        ASSESSMENT:   Palpitations  -     EKG 12-lead; Future    Essential hypertension    Chronic obstructive pulmonary disease, unspecified COPD type    PVD (peripheral vascular disease)      .    PLAN:  1. Chest pain atypical,, normal  Lexiscan cardiolye 10/22, continue to monitor,   2. Acute exacerbation of COPD, has refused tx in ER, now agrees to repeat evaluation, will refer to ER for further evaluation  3.  Hypertension: mostly controlled on current meds.   3. Claudication: controlled, following with Dr. Barrera  4. Tobacco abuse, discussed smoking cessation, now on Wellbutrin  5. Substance abuse, continues to smoke marijuana against medical advice  6. Bilateral hip secondary to osteoarthritis, limiting activity.   7. CAROLYN: on CPAP, uses most of the time, has appointment next month  8. Anxiety, due to prevous near death experience, stuck in  line x 18 hours, has had issues since mid 20s.   9. ED: follow up with primary care, no cardiac contraindications to sexual activity    Follow up in one year, sooner if symptoms change.     "

## 2024-03-14 NOTE — ED TRIAGE NOTES
PRESENTS FROM CARDIOLOGY OFFICE WITH REPORT OF LOW O2 SATS. PER PATIENTS FAMILY HE WENT TO PCP ON 3/13/2024 AND HE INCREASED O2 TO 3L NC. PATIENT ON 2 L ON ARRIVAL. PLACED ON 3L NC

## 2024-03-19 PROBLEM — G93.41 ACUTE METABOLIC ENCEPHALOPATHY: Status: ACTIVE | Noted: 2024-01-01

## 2024-03-19 PROBLEM — N17.9 AKI (ACUTE KIDNEY INJURY): Status: ACTIVE | Noted: 2024-01-01

## 2024-03-19 PROBLEM — E87.1 HYPONATREMIA: Status: ACTIVE | Noted: 2024-01-01

## 2024-03-19 PROBLEM — Z86.73 HISTORY OF CVA (CEREBROVASCULAR ACCIDENT): Status: ACTIVE | Noted: 2024-01-01

## 2024-03-19 PROBLEM — I21.A1 TYPE 2 MYOCARDIAL INFARCTION: Status: ACTIVE | Noted: 2024-01-01

## 2024-03-19 PROBLEM — J44.1 COPD EXACERBATION: Status: ACTIVE | Noted: 2021-06-28

## 2024-03-19 PROBLEM — R62.7 FAILURE TO THRIVE IN ADULT: Status: ACTIVE | Noted: 2024-01-01

## 2024-03-19 PROBLEM — E43 SEVERE PROTEIN-CALORIE MALNUTRITION: Status: ACTIVE | Noted: 2024-01-01

## 2024-03-19 PROBLEM — E46 PROTEIN-CALORIE MALNUTRITION: Status: ACTIVE | Noted: 2024-01-01

## 2024-03-19 NOTE — PLAN OF CARE
[facility] - Emergency Department  Initial Discharge Assessment       Primary Care Provider: Jose Manuel Castro MD    Admission Diagnosis: Chest pain [R07.9]    Admission Date: 3/18/2024  Expected Discharge Date: 3/21/2024    Transition of Care Barriers: None    Payor: HU[employer] MANAGED MEDICARE / Plan: HU[employer] SNP HMO PPO SPECIAL NEEDS / Product Type: Medicare Advantage /     Extended Emergency Contact Information  Primary Emergency Contact: Sj Delgado  Mobile Phone: [phone]  Relation: Brother  Preferred language: English   needed? No    Discharge Plan A: Home with family  Discharge Plan B: Home with family      [id number] - [place], [place] - [address] [facility]  [address]  [place] [address]  Phone: [phone] Fax: [phone]      Initial Assessment (most recent)       Adult Discharge Assessment - 03/19/24 1358          Discharge Assessment    Assessment Type Discharge Planning Assessment     Source of Information patient     Communicated LEONARD with patient/caregiver Date not available/Unable to determine     People in Home alone     Do you expect to return to your current living situation? Yes     Do you have help at home or someone to help you manage your care at home? Yes     Prior to hospitilization cognitive status: Unable to Assess     Current cognitive status: Alert/Oriented     Home Accessibility stairs to enter home     Number of Stairs, Main Entrance six     Home Layout Able to live on 1st floor     Equipment Currently Used at Home cane, straight;oxygen     Patient currently being followed by outpatient case management? No     Do you currently have service(s) that help you manage your care at home? No     Do you take prescription medications? Yes     Do you have prescription coverage? Yes     Coverage [employer]     Do you have any problems affording any of your prescribed medications? No     Is the patient taking medications as  prescribed? yes     How do you get to doctors appointments? family or friend will provide     Are you on dialysis? No     Do you take coumadin? No     Discharge Plan A Home with family     Discharge Plan B Home with family     DME Needed Upon Discharge  none     Discharge Plan discussed with: Patient     Transition of Care Barriers None        Physical Activity    On average, how many days per week do you engage in moderate to strenuous exercise (like a brisk walk)? 0 days     On average, how many minutes do you engage in exercise at this level? 0 min        Financial Resource Strain    How hard is it for you to pay for the very basics like food, housing, medical care, and heating? Not hard at all        Housing Stability    In the last 12 months, was there a time when you were not able to pay the mortgage or rent on time? No     In the last 12 months, how many places have you lived? 1     In the last 12 months, was there a time when you did not have a steady place to sleep or slept in a shelter (including now)? No        Transportation Needs    In the past 12 months, has lack of transportation kept you from medical appointments or from getting medications? No     In the past 12 months, has lack of transportation kept you from meetings, work, or from getting things needed for daily living? No        Food Insecurity    Within the past 12 months, you worried that your food would run out before you got the money to buy more. Never true     Within the past 12 months, the food you bought just didn't last and you didn't have money to get more. Never true        Stress    Do you feel stress - tense, restless, nervous, or anxious, or unable to sleep at night because your mind is troubled all the time - these days? Not at all        Social Connections    In a typical week, how many times do you talk on the phone with family, friends, or neighbors? More than three times a week     How often do you get together with friends or  relatives? More than three times a week     How often do you attend Hindu or Latter-day services? Never     Do you belong to any clubs or organizations such as Hindu groups, unions, fraternal or athletic groups, or school groups? No     How often do you attend meetings of the clubs or organizations you belong to? Never                      SS spoke with pt in the ER. Pt lives at home with someone. Pt is not current with any services. Does have an oxygen and a cane pta. Discharge plan is to return home with no needs. SS will follow

## 2024-03-19 NOTE — ASSESSMENT & PLAN NOTE
Patient's COPD is with exacerbation noted by continued dyspnea and worsening of baseline hypoxia currently.  Patient is currently on COPD Pathway. Continue scheduled inhalers Steroids, Antibiotics, and Supplemental oxygen and monitor respiratory status closely.     - on COPD pathway  - CXR official read pending  - ABG and blood cx x2 pending  - Duonebs q6h, budesonide q12h, Prednisone qd, Rocephin and Azithromycin IV qd  - home O2 NC 2L  - chest PT q8h

## 2024-03-19 NOTE — ASSESSMENT & PLAN NOTE
Patient has hyponatremia which is uncontrolled,We will aim to correct the sodium by 4-6mEq in 24 hours. We will monitor sodium Every 12 hours. The hyponatremia is due to likely from poor oral intake, cannot rule out SIADH at this point. We will obtain the following studies: Urine sodium, urine osmolality, serum osmolality. We will treat the hyponatremia with IV fluids as follows: NA at 75 cc/hr. The patient's sodium results have been reviewed and are listed below.  Recent Labs   Lab 03/19/24  0510   *

## 2024-03-19 NOTE — ASSESSMENT & PLAN NOTE
Troponin elevated but trending down.  Patient denies chest pain.  EKG without acute ischemic changes.  Checking echocardiogram.

## 2024-03-19 NOTE — ASSESSMENT & PLAN NOTE
Patient with acute kidney injury/acute renal failure likely due to pre-renal azotemia due to dehydration ROSA ISELA is currently worsening. Baseline creatinine  1.18 on 3/14/24  - Labs reviewed- Renal function/electrolytes with Estimated Creatinine Clearance: 24.2 mL/min (A) (based on SCr of 1.56 mg/dL (H)). according to latest data. Monitor urine output and serial BMP and adjust therapy as needed. Avoid nephrotoxins and renally dose meds for GFR listed above.    - IV NS@75cc/hr (maintenance rate due to weight 80 lb)  - UA negative for UTI  - monitor BMP  - renal dose meds, avoid nephrotoxic drugs   - holding home Lasix, HCTZ for now, restart as kidney function improves

## 2024-03-19 NOTE — ASSESSMENT & PLAN NOTE
Had evaluation with CT chest, abdomen and pelvis in 2022 and 2023 which was unremarkable for malignancy.   BMI 12 with significant signs of muscle wasting.   Nutritionist consulted.

## 2024-03-19 NOTE — ED PROVIDER NOTES
Encounter Date: 3/18/2024    SCRIBE #1 NOTE: I, Radha Goncalves, am scribing for, and in the presence of,  Carlos Weber DO. I have scribed the entire note.       History     Chief Complaint   Patient presents with    Shortness of Breath     Pt arrives via EMS metro from home for SOB, coughing, and weakness. Pt has hx copd and wears 2L o2 at home.     64 year old male presents to the ED via EMS complaining of shortness of breath. EMS says they were called out to patient's home for coughing, weakness and SOB. Patient was seen in the ED on 3/14 while seeing cardiology for low O2 sats. Patient's brother says patient has been not ate anything since being seen in the ED last week and says that patient has not been able to get around. Patient has a PMHx of COPD, CVA, PVD, and essential hypertension.     The history is provided by a relative and the EMS personnel. No  was used.     Review of patient's allergies indicates:  No Known Allergies  Past Medical History:   Diagnosis Date    COPD (chronic obstructive pulmonary disease)     CVA (cerebral vascular accident) 2018    Depression     Essential hypertension 07/22/2022    Nicotine dependence     PVD (peripheral vascular disease) 10/20/2022     Past Surgical History:   Procedure Laterality Date    EYE SURGERY       Family History   Problem Relation Age of Onset    Cancer Mother     Lung cancer Mother     Cancer Maternal Aunt     Heart disease Maternal Uncle     Birth defects Maternal Grandmother      Social History     Tobacco Use    Smoking status: Every Day     Current packs/day: 0.75     Average packs/day: 0.8 packs/day for 53.0 years (39.8 ttl pk-yrs)     Types: Cigarettes     Passive exposure: Current    Smokeless tobacco: Former     Types: Snuff, Chew    Tobacco comments:     Maybe 3 cig since 3/11/24   Substance Use Topics    Alcohol use: Yes     Comment: daily; 3 beers a day; 3 shots of liquor a day    Drug use: Yes     Types: Marijuana      Comment: 3 times daily     Review of Systems   Respiratory:  Positive for cough and shortness of breath.    Neurological:  Positive for weakness.       Physical Exam     Initial Vitals [03/18/24 2253]   BP Pulse Resp Temp SpO2   (!) 159/96 88 (!) 22 97.8 °F (36.6 °C) 97 %      MAP       --         Physical Exam    Nursing note and vitals reviewed.  Cardiovascular:  Regular rhythm and normal heart sounds.           Pulmonary/Chest:   Expiratory wheezing     Neurological: He is alert and oriented to person, place, and time.   Skin:   Wounds to dorsal aspect of right great toe and 2 on lateral side of left foot.          ED Course   Procedures  Labs Reviewed   COMPREHENSIVE METABOLIC PANEL - Abnormal; Notable for the following components:       Result Value    Sodium 127 (*)     Potassium 3.3 (*)     Chloride 89 (*)     Glucose 70 (*)     BUN 60 (*)     Creatinine 1.56 (*)     BUN/Creatinine Ratio 38 (*)     Albumin 2.9 (*)     ALT 62 (*)     AST 79 (*)     eGFR 49 (*)     All other components within normal limits   TROPONIN I - Abnormal; Notable for the following components:    Troponin I High Sensitivity 69.6 (*)     All other components within normal limits   NT-PRO NATRIURETIC PEPTIDE - Abnormal; Notable for the following components:    ProBNP 1,761 (*)     All other components within normal limits   CBC WITH DIFFERENTIAL - Abnormal; Notable for the following components:    WBC 15.53 (*)     MCH 32.3 (*)     MPV 13.2 (*)     Neutrophils % 90.1 (*)     Lymphocytes % 3.0 (*)     Monocytes % 6.2 (*)     Eosinophils % 0.0 (*)     Immature Granulocytes % 0.6 (*)     Neutrophils, Abs 13.99 (*)     Lymphocytes, Absolute 0.46 (*)     Monocytes, Absolute 0.97 (*)     Immature Granulocytes, Absolute 0.09 (*)     All other components within normal limits   URINALYSIS - Abnormal; Notable for the following components:    Blood, UA Moderate (*)     All other components within normal limits   URINALYSIS, MICROSCOPIC - Abnormal;  Notable for the following components:    RBC, UA 26 (*)     Bacteria, UA Occasional (*)     Hyaline Casts, UA 25-50 (*)     Yeast, UA Few (*)     Mucous Occasional (*)     All other components within normal limits   CBC MORPHOLOGY - Abnormal; Notable for the following components:    Platelet Morphology Decreased (*)     All other components within normal limits   TROPONIN I - Abnormal; Notable for the following components:    Troponin I High Sensitivity 64.1 (*)     All other components within normal limits   BASIC METABOLIC PANEL - Abnormal; Notable for the following components:    Sodium 122 (*)     Chloride 81 (*)     Glucose 61 (*)     BUN 67 (*)     Creatinine 1.81 (*)     BUN/Creatinine Ratio 37 (*)     eGFR 41 (*)     All other components within normal limits   CBC WITH DIFFERENTIAL - Abnormal; Notable for the following components:    WBC 15.63 (*)     MCH 32.3 (*)     Platelet Count 145 (*)     MPV 13.0 (*)     Neutrophils % 89.9 (*)     Lymphocytes % 2.7 (*)     Monocytes % 6.9 (*)     Eosinophils % 0.0 (*)     Neutrophils, Abs 14.04 (*)     Lymphocytes, Absolute 0.42 (*)     Monocytes, Absolute 1.08 (*)     Immature Granulocytes, Absolute 0.07 (*)     All other components within normal limits   MAGNESIUM - Abnormal; Notable for the following components:    Magnesium 2.4 (*)     All other components within normal limits   BASIC METABOLIC PANEL - Abnormal; Notable for the following components:    Sodium 124 (*)     Chloride 81 (*)     CO2 36 (*)     BUN 63 (*)     Creatinine 1.48 (*)     BUN/Creatinine Ratio 43 (*)     eGFR 52 (*)     All other components within normal limits   SARS-COV-2 RNA AMPLIFICATION, QUAL - Normal    Narrative:     Negative SARS-CoV results should not be used as the sole basis for treatment or patient management decisions; negative results should be considered in the context of a patient's recent exposures, history and the presene of clinical signs and symptoms consistent with  COVID-19.  Negative results should be treated as presumptive and confirmed by molecular assay, if necessary for patient management.   TROPONIN I - Normal   PHOSPHORUS - Normal   CBC W/ AUTO DIFFERENTIAL    Narrative:     The following orders were created for panel order CBC auto differential.  Procedure                               Abnormality         Status                     ---------                               -----------         ------                     CBC with Differential[[id number]]       Abnormal            Final result                 Please view results for these tests on the individual orders.   CBC W/ AUTO DIFFERENTIAL    Narrative:     The following orders were created for panel order CBC auto differential.  Procedure                               Abnormality         Status                     ---------                               -----------         ------                     CBC with Differential[[id number]]       Abnormal            Final result                 Please view results for these tests on the individual orders.   EXTRA TUBES    Narrative:     The following orders were created for panel order EXTRA TUBES.  Procedure                               Abnormality         Status                     ---------                               -----------         ------                     Light Blue Top Hold[[id number]]                             In process                   Please view results for these tests on the individual orders.   LIGHT BLUE TOP HOLD   EXTRA TUBES    Narrative:     The following orders were created for panel order EXTRA TUBES.  Procedure                               Abnormality         Status                     ---------                               -----------         ------                     Light Green Top Hold[[id number]]                            In process                 Lavender Top Hold[[id number]]                               In process                    Please view results for these tests on the individual orders.   LIGHT GREEN TOP HOLD   LAVENDER TOP HOLD        ECG Results              EKG 12-lead (Final result)        Collection Time Result Time QRS Duration [facility] QTC Calculation    03/18/24 22:52:43 03/20/24 04:31:32 104 448                     Final result by Interface, Lab In [facility] (03/20/24 04:31:39)                   Narrative:    Test Reason : R07.9,    Vent. Rate : 086 BPM     Atrial Rate : 000 BPM     P-R Int : 126 ms          QRS Dur : 104 ms      QT Int : 402 ms       P-R-T Axes : 083 094 084 degrees     QTc Int : 448 ms    Sinus rhythm  Possible left atrial abnormality  Rightward axis  Possible right ventricular hypertrophy  Inferior and septal ST-T abnormality may be due to the hypertrophy and/or  ischemia  Abnormal ECG    Confirmed by Shan Kim MD ([id number]) on 3/20/2024 4:31:25 AM    Referred By: [email or web address]   SELF           Confirmed By:Shan Kim MD                                  Imaging Results              X-Ray Chest AP Portable (Final result)  Result time 03/19/24 07:48:41   Procedure changed from X-Ray Chest PA And Lateral     Final result by James Ruffin MD (03/19/24 07:48:41)                   Impression:      No acute findings.      Electronically signed by: James Ruffin  Date:    03/19/2024  Time:    07:48               Narrative:    EXAMINATION:  XR CHEST AP PORTABLE    CLINICAL HISTORY:  Chest Pain;    TECHNIQUE:  Single frontal view of the chest was performed.    COMPARISON:  03/14/2024    FINDINGS:  Heart size normal.  Pleuroparenchymal scarring of the apices.  No focal infiltrate.  No pneumothorax.  No pleural effusion.                                       Medications   fluconazole tablet 200 mg (200 mg Oral Given 3/20/24 1228)     Medical Decision Making  Amount and/or Complexity of Data Reviewed  Labs: ordered.  Radiology: ordered.              Attending Attestation:           Physician Attestation for  Scribe:  Physician Attestation Statement for Scribe #1: I, Carlos Weber DO, reviewed documentation, as scribed by Radha Goncalves in my presence, and it is both accurate and complete.                        Medical Decision Making:   Initial Assessment:   64 year old male presents to the ED via EMS complaining of shortness of breath. EMS says they were called out to patient's home for coughing, weakness and SOB. Patient was seen in the ED on 3/14 while seeing cardiology for low O2 sats. Patient's brother says patient has been not ate anything since being seen in the ED last week and says that patient has not been able to get around. Patient has a PMHx of COPD, CVA, PVD, and essential hypertension.     The history is provided by a relative and the EMS personnel. No  was used.     Differential Diagnosis:   Final diagnoses:  [R07.9] Chest pain    ED Management:  Admit the patient             Clinical Impression:  Final diagnoses:  [R07.9] Chest pain          ED Disposition Condition    Observation                 Carlos Weber DO  03/26/24 0033

## 2024-03-19 NOTE — ASSESSMENT & PLAN NOTE
"Subjective:   Robert Hummel is a 74 y.o. female here today for DMV form    Encounter for completion of form with patient  Patient here today for DMV drivers license renewal form. She does not have any conditions that would affect her driving, not on any medication that would impair driving.     She is active, she walks and golfs regularly.     Her last eye exam was 1.5-2 years ago. Does not have glasses for driving, only reading. No history of eye disease, cataracts, macular degeneration, glaucoma.      Current medicines (including changes today)  Current Outpatient Medications   Medication Sig Dispense Refill    atorvastatin (LIPITOR) 40 MG Tab TAKE 1 TABLET BY MOUTH EVERY DAY IN THE EVENING 100 Tablet 0    ezetimibe (ZETIA) 10 MG Tab Take 1 Tablet by mouth every day. 100 Tablet 3    naproxen (NAPROSYN) 500 MG Tab TAKE 1 TABLET BY MOUTH TWICE A DAY WITH MEALS 30 Tab 1    Cholecalciferol (VITAMIN D-3) 5000 units Tab Take  by mouth.      Calcium Carbonate (CALCIUM 600 PO) Take  by mouth.      aspirin EC (ECOTRIN) 81 MG Tablet Delayed Response Take 81 mg by mouth every day.      acetaminophen (TYLENOL) 325 MG Tab Take 650 mg by mouth every four hours as needed.      vitamin e (VITAMIN E) 400 UNIT CAPS Take 400 Units by mouth every day.       No current facility-administered medications for this visit.     She  has a past medical history of Allergy, Arthritis, Cancer (HCC), and Hyperlipidemia.    ROS   No chest pain, no shortness of breath, no abdominal pain  Positive ROS as per HPI.  All other systems reviewed and are negative.     Objective:     /74   Pulse 82   Temp 35.9 °C (96.7 °F) (Temporal)   Resp 20   Ht 1.651 m (5' 5\")   Wt 60.7 kg (133 lb 12.8 oz)   SpO2 94%  Body mass index is 22.27 kg/m².     Physical Exam:  Constitutional: Alert, no distress.  Skin: Warm, dry, good turgor, no rashes in visible areas.  Eye: Equal, round and reactive, conjunctiva clear, lids normal.  ENMT: Lips without " Likely sec to hypoxia, hyponatremia.  Checking ammonia.  Expand workup if not improved with management above      lesions, good dentition  Respiratory: Unlabored respiratory effort  Psych: Alert and oriented x3, normal affect and mood.      Assessment and Plan:   The following treatment plan was discussed    1. Encounter for completion of form with patient  DVM form completed today  No concerns regarding safe driving ability as she is a very active, healthy 74 year old.     2. Need for vaccination  - INFLUENZA VACCINE, HIGH DOSE (65+ ONLY)      Followup: Return for Care As Needed.    The MA is performing the above orders under the direction of Dr. Murphy    Please note that this dictation was created using voice recognition software. I have made every reasonable attempt to correct obvious errors, but I expect that there are errors of grammar and possibly content that I did not discover before finalizing the note.

## 2024-03-19 NOTE — ASSESSMENT & PLAN NOTE
Patient with acute kidney injury/acute renal failure likely due to pre-renal azotemia due to dehydration ROSA ISELA is currently worsening. Baseline creatinine  1.18 on 3/14/24  - Labs reviewed- Renal function/electrolytes with Estimated Creatinine Clearance: 20.9 mL/min (A) (based on SCr of 1.81 mg/dL (H)). according to latest data. Monitor urine output and serial BMP and adjust therapy as needed. Avoid nephrotoxins and renally dose meds for GFR listed above.    - IV NS@75cc/hr (maintenance rate due to weight 80 lb)  - UA negative for UTI  - holding home Lasix, HCTZ for now, restart as kidney function improves

## 2024-03-19 NOTE — H&P
[facility] - Emergency Department  Hospital Medicine  History & Physical    Patient Name: Dereck Delgado  MRN: [id number]  Patient Class: OP- Observation  Admission Date: 3/18/2024  Attending Physician: Polo Robles MD   Primary Care Provider: Jose Manuel Castro MD         Patient information was obtained from patient, relative(s), past medical records, and ER records.     Subjective:     Principal Problem:COPD exacerbation    Chief Complaint:   Chief Complaint   Patient presents with    Shortness of Breath     Pt arrives via EMS metro from home for SOB, coughing, and weakness. Pt has hx copd and wears 2L o2 at home.        HPI: Patient is a 63yo male with a PMH of COPD on 2L NC O2 at home, CVA, HTN, PVD s/p 2 stents placement, depression who presents to [facility] ED via EMS with SOB, cough, confusion, weakness. Patient was sent to the ED from Dr. Skaggs's office 4 days ago on 3/14/24 for his symptoms along with low spO2. He was seen in the ED and was sent home to see if OP therapy would work. Today he returned with increased confusion, weakness, and SOB. Patient's brother reports decreased PO intake since ED visit and decreased mobility since. Denies difficulty swallowing or history of choking on food. He hasn't been out of the house due to the decreased activity, SOB, CURTIS. Endorses confusion (patient was cleaning carpet while hands were in the air per brother). Endorses chronic weight loss in the past 3 years (used to weigh 110 lb but now 80 lbs) since losing his wife. Endorses chronic night sweats all his life. Patient sees Dr. Castro as his PCP and Dr. Kim as his pulmonologist.     Upon presentation, VS remarkable for 159/96, 22. Labs remarkable for WBC 15.53, Na 127, Cl 89, K 3.3, Bun/Cr 60/1.5, GFR 49, glucose 70. BNP 1761, troponins 69.6, 64.1. EKG SR 86. CXR official read pending. Patient is admitted to hospital medicine for further management and care.    Past Medical History:   Diagnosis Date     COPD (chronic obstructive pulmonary disease)     CVA (cerebral vascular accident)     Depression     Essential hypertension 07/22/2022    Nicotine dependence     PVD (peripheral vascular disease) 10/20/2022       Past Surgical History:   Procedure Laterality Date    EYE SURGERY         Review of patient's allergies indicates:  No Known Allergies    No current facility-administered medications on file prior to encounter.     Current Outpatient Medications on File Prior to Encounter   Medication Sig    albuterol (VENTOLIN HFA) 90 mcg/actuation inhaler Inhale 2 puffs into the lungs every 6 (six) hours as needed for Wheezing. Rescue    amitriptyline (ELAVIL) 25 MG tablet Take 25 mg by mouth every evening.    aspirin (ECOTRIN) 81 MG EC tablet Take 1 tablet (81 mg total) by mouth once daily.    budesonide-glycopyr-formoterol 160-9-4.8 mcg/actuation HFAA Inhale 2 puffs into the lungs 2 (two) times daily.    buPROPion (WELLBUTRIN SR) 150 MG TBSR 12 hr tablet TAKE 1 TABLET BY MOUTH TWICE A DAY    clindamycin (CLEOCIN) 300 MG capsule Take 1 capsule (300 mg total) by mouth 2 (two) times daily. for 20 days    clopidogreL (PLAVIX) 75 mg tablet Take 1 tablet (75 mg total) by mouth once daily.    diclofenac sodium (SOLARAZE) 3 % gel Apply topically 2 (two) times daily.    DULoxetine (CYMBALTA) 60 MG capsule Take 1 capsule (60 mg total) by mouth once daily.    furosemide (LASIX) 20 MG tablet Take 1 tablet (20 mg total) by mouth every other day.    hydroCHLOROthiazide (HYDRODIURIL) 12.5 MG Tab Take 1 tablet (12.5 mg total) by mouth once daily.    methylPREDNISolone (MEDROL DOSEPACK) 4 mg tablet use as directed     Family History       Problem Relation (Age of Onset)    Birth defects Maternal Grandmother    Cancer Mother, Maternal Aunt    Heart disease Maternal Uncle          Tobacco Use    Smoking status: Every Day     Current packs/day: 0.75     Average packs/day: 0.8 packs/day for 53.0 years (39.8 ttl pk-yrs)     Types:  Cigarettes     Passive exposure: Current    Smokeless tobacco: Former     Types: Snuff, Chew    Tobacco comments:     Maybe 3 cig since 3/11/24   Substance and Sexual Activity    Alcohol use: Yes     Comment: daily; 3 beers a day; 3 shots of liquor a day    Drug use: Yes     Types: Marijuana     Comment: 3 times daily    Sexual activity: Never     Review of Systems   Constitutional:  Positive for diaphoresis. Negative for chills, fatigue and fever.   HENT:  Negative for trouble swallowing.    Eyes:  Negative for photophobia and visual disturbance.   Respiratory:  Positive for shortness of breath and wheezing. Negative for stridor.    Cardiovascular:  Negative for chest pain.   Gastrointestinal:  Negative for abdominal pain, anal bleeding, blood in stool, constipation, diarrhea, nausea and vomiting.   Endocrine: Negative for cold intolerance, heat intolerance, polydipsia, polyphagia and polyuria.   Genitourinary:  Positive for decreased urine volume. Negative for difficulty urinating, dysuria, flank pain and hematuria.   Musculoskeletal:  Positive for back pain.   Skin:  Positive for rash.   Allergic/Immunologic: Negative for immunocompromised state.   Neurological:  Positive for weakness. Negative for syncope and speech difficulty.   Hematological:  Negative for adenopathy. Does not bruise/bleed easily.   Psychiatric/Behavioral:  Negative for sleep disturbance.      Objective:     Vital Signs (Most Recent):  Temp: 97.8 °F (36.6 °C) (03/18/24 2253)  Pulse: 84 (03/19/24 0253)  Resp: 19 (03/19/24 0253)  BP: (!) 168/85 (03/19/24 0253)  SpO2: 100 % (03/19/24 0253) Vital Signs (24h Range):  Temp:  [97.8 °F (36.6 °C)] 97.8 °F (36.6 °C)  Pulse:  [81-88] 84  Resp:  [15-22] 19  SpO2:  [94 %-100 %] 100 %  BP: (140-178)/(74-96) 168/85     Weight: 36.3 kg (80 lb)  Body mass index is 12.53 kg/m².     Physical Exam  Constitutional:       Appearance: Normal appearance.   HENT:      Head: Normocephalic and atraumatic.       Mouth/Throat:      Mouth: Mucous membranes are dry.   Eyes:      Extraocular Movements: Extraocular movements intact.      Conjunctiva/sclera: Conjunctivae normal.      Pupils: Pupils are equal, round, and reactive to light.   Cardiovascular:      Rate and Rhythm: Normal rate and regular rhythm.      Pulses: Normal pulses.      Heart sounds: Normal heart sounds.   Pulmonary:      Effort: Pulmonary effort is normal. No respiratory distress.      Breath sounds: Wheezing, rhonchi and rales present.   Abdominal:      General: Abdomen is flat. Bowel sounds are normal. There is no distension.      Palpations: Abdomen is soft. There is no mass.      Tenderness: There is no abdominal tenderness. There is no guarding or rebound.   Musculoskeletal:         General: No swelling, tenderness or deformity. Normal range of motion.      Cervical back: No rigidity.   Lymphadenopathy:      Cervical: No cervical adenopathy.   Skin:     General: Skin is warm and dry.   Neurological:      General: No focal deficit present.      Mental Status: He is alert.      Cranial Nerves: No cranial nerve deficit.      Sensory: No sensory deficit.      Motor: No weakness.   Psychiatric:         Behavior: Behavior normal.              CRANIAL NERVES     CN III, IV, VI   Pupils are equal, round, and reactive to light.       Significant Labs: All pertinent labs within the past 24 hours have been reviewed.    Significant Imaging: I have reviewed all pertinent imaging results/findings within the past 24 hours.  Assessment/Plan:     * COPD exacerbation  Patient's COPD is with exacerbation noted by continued dyspnea and worsening of baseline hypoxia currently.  Patient is currently on COPD Pathway. Continue scheduled inhalers Steroids, Antibiotics, and Supplemental oxygen and monitor respiratory status closely.     - on COPD pathway  - CXR official read pending  - ABG and blood cx x2 pending  - Duonebs q6h, budesonide q12h, Prednisone qd, Rocephin and  Azithromycin IV qd  - home O2 NC 2L  - chest PT q8h    Essential hypertension  Chronic, controlled. Latest blood pressure and vitals reviewed-     Temp:  [97.8 °F (36.6 °C)]   Pulse:  [81-88]   Resp:  [15-22]   BP: (140-178)/()   SpO2:  [94 %-100 %] .   Home meds for hypertension were reviewed and noted below.   Hypertension Medications               furosemide (LASIX) 20 MG tablet Take 1 tablet (20 mg total) by mouth every other day.    hydroCHLOROthiazide (HYDRODIURIL) 12.5 MG Tab Take 1 tablet (12.5 mg total) by mouth once daily.            While in the hospital, will manage blood pressure as follows; Adjust home antihypertensive regimen as follows- see below    Will utilize p.r.n. blood pressure medication only if patient's blood pressure greater than 180/110 and he develops symptoms such as worsening chest pain or shortness of breath.    - holding home lasix and HCTZ d/t ROSA ISELA, restart as kidney function improves  - start Norvasc 10 qd  - hydralazine q6h prn with parameters        ROSA ISELA (acute kidney injury)  Patient with acute kidney injury/acute renal failure likely due to pre-renal azotemia due to dehydration ROSA ISELA is currently worsening. Baseline creatinine  1.18 on 3/14/24  - Labs reviewed- Renal function/electrolytes with Estimated Creatinine Clearance: 24.2 mL/min (A) (based on SCr of 1.56 mg/dL (H)). according to latest data. Monitor urine output and serial BMP and adjust therapy as needed. Avoid nephrotoxins and renally dose meds for GFR listed above.    - IV NS@75cc/hr (maintenance rate due to weight 80 lb)  - UA negative for UTI  - monitor BMP  - renal dose meds, avoid nephrotoxic drugs   - holding home Lasix, HCTZ for now, restart as kidney function improves    PVD (peripheral vascular disease)  - S/p 2 stents in LE's per pt, following with Dr. Barrera   - home ASA and Plavix      Protein-calorie malnutrition  - BMI 12.53  - boost nutrition supplement tidwm    Nutrition consulted. Most recent weight  and BMI monitored-     Measurements:  Wt Readings from Last 1 Encounters:   03/18/24 36.3 kg (80 lb)   Body mass index is 12.53 kg/m².    Patient has been screened and assessed by RD.    Malnutrition Type:  Context:    Level:      Malnutrition Characteristic Summary:       Interventions/Recommendations (treatment strategy):           Depression  Patient has persistent depression which is mild and is currently controlled. Will Continue anti-depressant medications. We will not consult psychiatry at this time. Patient does not display psychosis at this time. Continue to monitor closely and adjust plan of care as needed.    Home wellbutrin    History of CVA (cerebrovascular accident)  - around 2018 without significant residual hemiparesis  - home ASA and plavix        VTE Risk Mitigation (From admission, onward)           Ordered     Place VIPUL hose  Until discontinued         03/19/24 0430     IP VTE LOW RISK PATIENT  Once         03/19/24 0430                           On 03/19/2024, patient should be placed in hospital observation services under my care in collaboration with Dr. Polo Robles.         Jules Rothman DO  Department of Hospital Medicine  [facility] - Emergency Department

## 2024-03-19 NOTE — ASSESSMENT & PLAN NOTE
Patient has persistent depression which is mild and is currently controlled. Will Continue anti-depressant medications. We will not consult psychiatry at this time. Patient does not display psychosis at this time. Continue to monitor closely and adjust plan of care as needed.    Home wellbutrin

## 2024-03-19 NOTE — SUBJECTIVE & OBJECTIVE
Past Medical History:   Diagnosis Date    COPD (chronic obstructive pulmonary disease)     CVA (cerebral vascular accident)     Depression     Essential hypertension 07/22/2022    Nicotine dependence     PVD (peripheral vascular disease) 10/20/2022       Past Surgical History:   Procedure Laterality Date    EYE SURGERY         Review of patient's allergies indicates:  No Known Allergies    No current facility-administered medications on file prior to encounter.     Current Outpatient Medications on File Prior to Encounter   Medication Sig    albuterol (VENTOLIN HFA) 90 mcg/actuation inhaler Inhale 2 puffs into the lungs every 6 (six) hours as needed for Wheezing. Rescue    amitriptyline (ELAVIL) 25 MG tablet Take 25 mg by mouth every evening.    aspirin (ECOTRIN) 81 MG EC tablet Take 1 tablet (81 mg total) by mouth once daily.    budesonide-glycopyr-formoterol 160-9-4.8 mcg/actuation HFAA Inhale 2 puffs into the lungs 2 (two) times daily.    buPROPion (WELLBUTRIN SR) 150 MG TBSR 12 hr tablet TAKE 1 TABLET BY MOUTH TWICE A DAY    clindamycin (CLEOCIN) 300 MG capsule Take 1 capsule (300 mg total) by mouth 2 (two) times daily. for 20 days    clopidogreL (PLAVIX) 75 mg tablet Take 1 tablet (75 mg total) by mouth once daily.    diclofenac sodium (SOLARAZE) 3 % gel Apply topically 2 (two) times daily.    DULoxetine (CYMBALTA) 60 MG capsule Take 1 capsule (60 mg total) by mouth once daily.    furosemide (LASIX) 20 MG tablet Take 1 tablet (20 mg total) by mouth every other day.    hydroCHLOROthiazide (HYDRODIURIL) 12.5 MG Tab Take 1 tablet (12.5 mg total) by mouth once daily.    methylPREDNISolone (MEDROL DOSEPACK) 4 mg tablet use as directed     Family History       Problem Relation (Age of Onset)    Birth defects Maternal Grandmother    Cancer Mother, Maternal Aunt    Heart disease Maternal Uncle          Tobacco Use    Smoking status: Every Day     Current packs/day: 0.75     Average packs/day: 0.8 packs/day for 53.0  years (39.8 ttl pk-yrs)     Types: Cigarettes     Passive exposure: Current    Smokeless tobacco: Former     Types: Snuff, Chew    Tobacco comments:     Maybe 3 cig since 3/11/24   Substance and Sexual Activity    Alcohol use: Yes     Comment: daily; 3 beers a day; 3 shots of liquor a day    Drug use: Yes     Types: Marijuana     Comment: 3 times daily    Sexual activity: Never     Review of Systems   Constitutional:  Positive for diaphoresis. Negative for chills, fatigue and fever.   HENT:  Negative for trouble swallowing.    Eyes:  Negative for photophobia and visual disturbance.   Respiratory:  Positive for shortness of breath and wheezing. Negative for stridor.    Cardiovascular:  Negative for chest pain.   Gastrointestinal:  Negative for abdominal pain, anal bleeding, blood in stool, constipation, diarrhea, nausea and vomiting.   Endocrine: Negative for cold intolerance, heat intolerance, polydipsia, polyphagia and polyuria.   Genitourinary:  Positive for decreased urine volume. Negative for difficulty urinating, dysuria, flank pain and hematuria.   Musculoskeletal:  Positive for back pain.   Skin:  Positive for rash.   Allergic/Immunologic: Negative for immunocompromised state.   Neurological:  Positive for weakness. Negative for syncope and speech difficulty.   Hematological:  Negative for adenopathy. Does not bruise/bleed easily.   Psychiatric/Behavioral:  Negative for sleep disturbance.      Objective:     Vital Signs (Most Recent):  Temp: 97.8 °F (36.6 °C) (03/18/24 2253)  Pulse: 84 (03/19/24 0253)  Resp: 19 (03/19/24 0253)  BP: (!) 168/85 (03/19/24 0253)  SpO2: 100 % (03/19/24 0253) Vital Signs (24h Range):  Temp:  [97.8 °F (36.6 °C)] 97.8 °F (36.6 °C)  Pulse:  [81-88] 84  Resp:  [15-22] 19  SpO2:  [94 %-100 %] 100 %  BP: (140-178)/(74-96) 168/85     Weight: 36.3 kg (80 lb)  Body mass index is 12.53 kg/m².     Physical Exam  Constitutional:       Appearance: Normal appearance.   HENT:      Head:  Normocephalic and atraumatic.      Mouth/Throat:      Mouth: Mucous membranes are dry.   Eyes:      Extraocular Movements: Extraocular movements intact.      Conjunctiva/sclera: Conjunctivae normal.      Pupils: Pupils are equal, round, and reactive to light.   Cardiovascular:      Rate and Rhythm: Normal rate and regular rhythm.      Pulses: Normal pulses.      Heart sounds: Normal heart sounds.   Pulmonary:      Effort: Pulmonary effort is normal. No respiratory distress.      Breath sounds: Wheezing, rhonchi and rales present.   Abdominal:      General: Abdomen is flat. Bowel sounds are normal. There is no distension.      Palpations: Abdomen is soft. There is no mass.      Tenderness: There is no abdominal tenderness. There is no guarding or rebound.   Musculoskeletal:         General: No swelling, tenderness or deformity. Normal range of motion.      Cervical back: No rigidity.   Lymphadenopathy:      Cervical: No cervical adenopathy.   Skin:     General: Skin is warm and dry.   Neurological:      General: No focal deficit present.      Mental Status: He is alert.      Cranial Nerves: No cranial nerve deficit.      Sensory: No sensory deficit.      Motor: No weakness.   Psychiatric:         Behavior: Behavior normal.              CRANIAL NERVES     CN III, IV, VI   Pupils are equal, round, and reactive to light.       Significant Labs: All pertinent labs within the past 24 hours have been reviewed.    Significant Imaging: I have reviewed all pertinent imaging results/findings within the past 24 hours.

## 2024-03-19 NOTE — CONSULTS
"[facility] - Emergency Department  Adult Nutrition  Consult Note         Reason for Assessment  Reason For Assessment: consult   Nutrition Risk Screen: reduced oral intake over the last month    Assessment and Plan  Consult received and appreciated. Consult for malnutrition. Patient is a 65yo male admitted 3/18 for COPD exacerbation.     Per MD:  "Patient is a 65yo male with a PMH of COPD on 2L NC O2 at home, CVA, HTN, PVD s/p 2 stents placement, depression who presents to [facility] ED via EMS with SOB, cough, confusion, weakness. Patient was sent to the ED from Dr. Skaggs's office 4 days ago on 3/14/24 for his symptoms along with low spO2. He was seen in the ED and was sent home to see if OP therapy would work. Today he returned with increased confusion, weakness, and SOB. Patient's brother reports decreased PO intake since ED visit and decreased mobility since. Denies difficulty swallowing or history of choking on food. He hasn't been out of the house due to the decreased activity, SOB, CURTIS. Endorses confusion (patient was cleaning carpet while hands were in the air per brother). Endorses chronic weight loss in the past 3 years (used to weigh 110 lb but now 80 lbs) since losing his wife. Endorses chronic night sweats all his life. Patient sees Dr. Castro as his PCP and Dr. Kim as his pulmonologist."    Patient is 80 pounds with a BMI of 12.53 and is severely underweight. He is noted to have a 15% significant weight loss since May of last year. Per ASPEN guidelines, patient meets criteria for severe protein-calorie malnutrition.     Patient is currently ordered a Regular diet. Recommend continue as tolerated. Encourage good PO intakes. Also recommend continue Boost Breeze TID to improve kcal/protein intakes.     Last BM unknown.    Medications/labs reviewed. RD following.     Learning Needs/Social Determinants of Health    Learning Assessment       10/20/2022 1929 [facility] " (10/20/2022 - 10/21/2022)   Created by Jesse Dias RN - RN (Nurse) Status: Complete                 PRIMARY LEARNER     Primary Learner Name:  clinton stubbs TM - 10/20/2022 1929    Relationship:  Patient TM - 10/20/2022 1929    Does the primary learner have any barriers to learning?:  No Barriers TM - 10/20/2022 1929    What is the preferred language of the primary learner?:  English TM - 10/20/2022 1929    Is an  required?:  No TM - 10/20/2022 1929    How does the primary learner prefer to learn new concepts?:  Listening TM - 10/20/2022 1929    How often do you need to have someone help you read instructions, pamphlets, or written material from your doctor or pharmacy?:  Never TM - 10/20/2022 1929        CO-LEARNER #1     No question answered        CO-LEARNER #2     No question answered        SPECIAL TOPICS     No question answered        ANSWERED BY:     No question answered        Edit History       Jesse Dias, RN - RN (Nurse)   10/20/2022 1929                           Social Determinants of Health     Tobacco Use: High Risk (3/19/2024)    Patient History     Smoking Tobacco Use: Every Day     Smokeless Tobacco Use: Former     Passive Exposure: Current   Alcohol Use: Not on file   Financial Resource Strain: Low Risk  (3/19/2024)    Overall Financial Resource Strain (CARDIA)     Difficulty of Paying Living Expenses: Not hard at all   Food Insecurity: No Food Insecurity (3/19/2024)    Hunger Vital Sign     Worried About Running Out of Food in the Last Year: Never true     Ran Out of Food in the Last Year: Never true   Transportation Needs: No Transportation Needs (3/19/2024)    PRAPARE - Transportation     Lack of Transportation (Medical): No     Lack of Transportation (Non-Medical): No   Physical Activity: Inactive (3/19/2024)    Exercise Vital Sign     Days of Exercise per Week: 0 days     Minutes of Exercise per Session: 0 min   Stress: No Stress Concern Present (3/19/2024)     [facility] [place] of Occupational Health - Occupational Stress Questionnaire     Feeling of Stress : Not at all   Social Connections: Unknown (3/19/2024)    Social Connection and Isolation Panel [NHANES]     Frequency of Communication with Friends and Family: More than three times a week     Frequency of Social Gatherings with Friends and Family: More than three times a week     Attends Druze Services: Never     Active Member of Clubs or Organizations: No     Attends Club or Organization Meetings: Never     Marital Status: Not on file   Housing Stability: Low Risk  (3/19/2024)    Housing Stability Vital Sign     Unable to Pay for Housing in the Last Year: No     Number of Places Lived in the Last Year: 1     Unstable Housing in the Last Year: No   Depression: Low Risk  (3/13/2024)    Depression     Last PHQ-4: Flowsheet Data: 0          Malnutrition  Is Patient Malnourished: Yes Malnutrition Assessment  Malnutrition Context: chronic illness, social/environmental circumstances  Malnutrition Level: severe          Weight Loss (Malnutrition): greater than 7.5% in 3 months  Energy Intake (Malnutrition): less than or equal to 50% for greater than or equal to 1 month                         Nutrition Diagnosis  Malnutrition (Severe) related to Appetite loss, Catabolic illness, Inadequate Caloric intake, and Inadequate Protein intake as evidenced by low BMI, report of poor PO intakes, 15% weight loss within the past 9 months.   Comments: continue Regular diet, continue ONS    Recent Labs   Lab 03/19/24  1208   GLU 99         Nutrition Prescription / Recommendations  Recommendation/Intervention: Recommend continue current diet as tolerated. Encourage good PO intakes. Also recommend addition of Boost Plus TID to improve kcal/protein intakes to better meet estimated nutritional needs.  Goals: Intake 50-75% of meals and supplements during admission, weight maintenance during admission  Nutrition Goal Status: new  Current  "Diet Order: Regular  Chewing or Swallowing Difficulty?: No Chewing or swallowing difficulty  Recommended Diet: Regular  Recommended Oral Supplement: Boost Breeze [250kcals, 9g Protein, 54g Carbs] with meals  Is Nutrition Support Recommended: [facility] Support: No  Is Nutrition Education Recommended: No    Monitor and Evaluation  % current Intake: New Diet order with no P.O. intake documented currently  % intake to meet estimated needs: P.O. + Supplements  Food and Nutrient Intake: food and beverage intake  Food and Nutrient Adminstration: diet order  Anthropometric Measurements: weight change, weight  Biochemical Data, Medical Tests and Procedures: electrolyte and renal panel, gastrointestinal profile, glucose/endocrine profile, inflammatory profile, lipid profile       Current Medical Diagnosis and Past Medical History  Diagnosis: pulmonary disease  Past Medical History:   Diagnosis Date    COPD (chronic obstructive pulmonary disease)     CVA (cerebral vascular accident) 2018    Depression     Essential hypertension 07/22/2022    Nicotine dependence     PVD (peripheral vascular disease) 10/20/2022       Nutrition/Diet History  Food Allergies: NKFA    Lab/Procedures/Meds  Recent Labs   Lab 03/18/24  2331 03/19/24  0510 03/19/24  1208   *   < > 124*   K 3.3*   < > 3.5   BUN 60*   < > 63*   CREATININE 1.56*   < > 1.48*   CALCIUM 8.6   < > 9.4   ALBUMIN 2.9*  --   --    CL 89*   < > 81*   ALT 62*  --   --    AST 79*  --   --     < > = values in this interval not displayed.   Note: Na+, Cl- low. Recommend consider replete to WNL as appropriate. BUN/Cr elevated. PMH ROSA ISELA.   Last A1c: No results found for: "HGBA1C"  Lab Results   Component Value Date    RBC 5.38 03/19/2024    HGB 17.4 03/19/2024    HCT 51 03/19/2024    MCV 94.8 03/19/2024    MCH 32.3 (H) 03/19/2024    MCHC 34.1 03/19/2024     Pertinent Labs Reviewed: reviewed  Pertinent Medications Reviewed: reviewed  Scheduled Meds:   " albuterol-ipratropium  3 mL Nebulization Q6H WAKE    amLODIPine  10 mg Oral Daily    aspirin  81 mg Oral Daily    azithromycin  500 mg Intravenous Q24H    budesonide  0.5 mg Nebulization Q12H    buPROPion  150 mg Oral BID    [START ON 3/20/2024] cefTRIAXone (Rocephin) IV (PEDS and ADULTS)  2 g Intravenous Q24H    clopidogreL  75 mg Oral Daily    heparin (porcine)  5,000 Units Subcutaneous Q8H    methylPREDNISolone sodium succinate  40 mg Intravenous Q6H     Continuous Infusions:   sodium chloride 0.9% 75 mL/hr at 03/19/24 0500     PRN Meds:.acetaminophen, albuterol sulfate, hydrALAZINE, melatonin, polyethylene glycol, sodium chloride 0.9%    Anthropometrics  Temp: 97.8 °F (36.6 °C)  Weight: 36.3 kg (80 lb)  Weight (lb): 80 lb       Estimated/Assessed Needs      Temp: 97.8 °F (36.6 °C)Oral  Weight Used For Calorie Calculations: 36.3 kg (80 lb 0.4 oz)     Energy Calorie Requirements (kcal): 1452-1634kcal (40-45kcal/kg)  Weight Used For Protein Calculations: 67.1 kg (148 lb)  Protein Requirements: 40-54g (0.6-0.8g/kg ideal body weight)       RDA Method (mL): 1452       Nutrition by Nursing                               Nutrition Follow-Up  RD Follow-up?: Yes      Nutrition Discharge Planning: Unsure of discharge plans at this time. Will monitor and assess closer to discharge.          Jenifer Alvarez, MS, RD, LD  Available via Secure Chat

## 2024-03-19 NOTE — SUBJECTIVE & OBJECTIVE
Interval History: Patient seen and examined at the bedside, he appears confused but awake and able to answer most questions.     Review of Systems   Respiratory:  Positive for shortness of breath.    Neurological:  Positive for tremors and weakness.     Objective:     Vital Signs (Most Recent):  Temp: 97.8 °F (36.6 °C) (03/18/24 2253)  Pulse: 80 (03/19/24 1132)  Resp: (!) 25 (03/19/24 1112)  BP: (!) 149/84 (03/19/24 1132)  SpO2: 100 % (03/19/24 1132) Vital Signs (24h Range):  Temp:  [97.8 °F (36.6 °C)] 97.8 °F (36.6 °C)  Pulse:  [79-88] 80  Resp:  [13-25] 25  SpO2:  [94 %-100 %] 100 %  BP: (115-187)/() 149/84     Weight: 36.3 kg (80 lb)  Body mass index is 12.53 kg/m².    Intake/Output Summary (Last 24 hours) at 3/19/2024 1241  Last data filed at 3/19/2024 0629  Gross per 24 hour   Intake --   Output 600 ml   Net -600 ml         Physical Exam  Constitutional:       General: He is awake.      Appearance: He is cachectic. He is ill-appearing.   HENT:      Head: Normocephalic.      Mouth/Throat:      Mouth: Mucous membranes are dry.   Eyes:      Extraocular Movements: Extraocular movements intact.   Cardiovascular:      Rate and Rhythm: Normal rate and regular rhythm.   Pulmonary:      Effort: Pulmonary effort is normal. No respiratory distress.      Breath sounds: Rhonchi present.   Abdominal:      General: Abdomen is flat. There is no distension.      Tenderness: There is no abdominal tenderness.   Musculoskeletal:         General: No swelling.   Neurological:      General: No focal deficit present.      Mental Status: He is disoriented.             Significant Labs: All pertinent labs within the past 24 hours have been reviewed.    Significant Imaging: I have reviewed all pertinent imaging results/findings within the past 24 hours.

## 2024-03-19 NOTE — HPI
Patient is a 63yo male with a PMH of COPD on 2L NC O2 at home, CVA, HTN, PVD s/p 2 stents placement, depression who presents to [facility] ED via EMS with SOB, cough, confusion, weakness. Patient was sent to the ED from Dr. Skaggs's office 4 days ago on 3/14/24 for his symptoms along with low spO2. He was seen in the ED and was sent home to see if OP therapy would work. Today he returned with increased confusion, weakness, and SOB. Patient's brother reports decreased PO intake since ED visit and decreased mobility since. Denies difficulty swallowing or history of choking on food. He hasn't been out of the house due to the decreased activity, SOB, CURTIS. Endorses confusion (patient was cleaning carpet while hands were in the air per brother). Endorses chronic weight loss in the past 3 years (used to weigh 110 lb but now 80 lbs) since losing his wife. Endorses chronic night sweats all his life. Patient sees Dr. Castro as his PCP and Dr. Kim as his pulmonologist.     Upon presentation, VS remarkable for 159/96, 22. Labs remarkable for WBC 15.53, Na 127, Cl 89, K 3.3, Bun/Cr 60/1.5, GFR 49, glucose 70. BNP 1761, troponins 69.6, 64.1. EKG SR 86. CXR official read pending. Patient is admitted to hospital medicine for further management and care.

## 2024-03-19 NOTE — PROGRESS NOTES
[facility] Emergency Department  Hospital Medicine  Progress Note    Patient Name: Dereck Delgado  MRN: [id number]  Patient Class: OP- Observation   Admission Date: 3/18/2024  Length of Stay: 0 days  Attending Physician: Andry Moncada MD  Primary Care Provider: Jose Manuel Castro MD        Subjective:     Principal Problem:COPD exacerbation        HPI:  Patient is a 63yo male with a PMH of COPD on 2L NC O2 at home, CVA, HTN, PVD s/p 2 stents placement, depression who presents to [facility] ED via EMS with SOB, cough, confusion, weakness. Patient was sent to the ED from Dr. Skaggs's office 4 days ago on 3/14/24 for his symptoms along with low spO2. He was seen in the ED and was sent home to see if OP therapy would work. Today he returned with increased confusion, weakness, and SOB. Patient's brother reports decreased PO intake since ED visit and decreased mobility since. Denies difficulty swallowing or history of choking on food. He hasn't been out of the house due to the decreased activity, SOB, CURTIS. Endorses confusion (patient was cleaning carpet while hands were in the air per brother). Endorses chronic weight loss in the past 3 years (used to weigh 110 lb but now 80 lbs) since losing his wife. Endorses chronic night sweats all his life. Patient sees Dr. Castro as his PCP and Dr. Kmi as his pulmonologist.     Upon presentation, VS remarkable for 159/96, 22. Labs remarkable for WBC 15.53, Na 127, Cl 89, K 3.3, Bun/Cr 60/1.5, GFR 49, glucose 70. BNP 1761, troponins 69.6, 64.1. EKG SR 86. CXR official read pending. Patient is admitted to hospital medicine for further management and care.    Overview/Hospital Course:  3/19- Continue steroids, antibiotics, supplemental O2. Brother at bedside and plan of care discussed in detail.     Interval History: Patient seen and examined at the bedside, he appears confused but awake and able to answer most questions.     Review of Systems   Respiratory:  Positive  for shortness of breath.    Neurological:  Positive for tremors and weakness.     Objective:     Vital Signs (Most Recent):  Temp: 97.8 °F (36.6 °C) (03/18/24 2253)  Pulse: 80 (03/19/24 1132)  Resp: (!) 25 (03/19/24 1112)  BP: (!) 149/84 (03/19/24 1132)  SpO2: 100 % (03/19/24 1132) Vital Signs (24h Range):  Temp:  [97.8 °F (36.6 °C)] 97.8 °F (36.6 °C)  Pulse:  [79-88] 80  Resp:  [13-25] 25  SpO2:  [94 %-100 %] 100 %  BP: (115-187)/() 149/84     Weight: 36.3 kg (80 lb)  Body mass index is 12.53 kg/m².    Intake/Output Summary (Last 24 hours) at 3/19/2024 1241  Last data filed at 3/19/2024 0629  Gross per 24 hour   Intake --   Output 600 ml   Net -600 ml         Physical Exam  Constitutional:       General: He is awake.      Appearance: He is cachectic. He is ill-appearing.   HENT:      Head: Normocephalic.      Mouth/Throat:      Mouth: Mucous membranes are dry.   Eyes:      Extraocular Movements: Extraocular movements intact.   Cardiovascular:      Rate and Rhythm: Normal rate and regular rhythm.   Pulmonary:      Effort: Pulmonary effort is normal. No respiratory distress.      Breath sounds: Rhonchi present.   Abdominal:      General: Abdomen is flat. There is no distension.      Tenderness: There is no abdominal tenderness.   Musculoskeletal:         General: No swelling.   Neurological:      General: No focal deficit present.      Mental Status: He is disoriented.             Significant Labs: All pertinent labs within the past 24 hours have been reviewed.    Significant Imaging: I have reviewed all pertinent imaging results/findings within the past 24 hours.    Assessment/Plan:      * COPD exacerbation  Patient's COPD is with exacerbation noted by continued dyspnea and worsening of baseline hypoxia currently.  Patient is currently on COPD Pathway. Continue scheduled inhalers Steroids, Antibiotics, and Supplemental oxygen and monitor respiratory status closely.     - on COPD pathway  - CXR without acute  abnormalities.   - ABG with hypoxia- on 2L O2 at home PRN.   - chest PT q8h    ROSA ISELA (acute kidney injury)  Patient with acute kidney injury/acute renal failure likely due to pre-renal azotemia due to dehydration ROSA ISELA is currently worsening. Baseline creatinine  1.18 on 3/14/24  - Labs reviewed- Renal function/electrolytes with Estimated Creatinine Clearance: 20.9 mL/min (A) (based on SCr of 1.81 mg/dL (H)). according to latest data. Monitor urine output and serial BMP and adjust therapy as needed. Avoid nephrotoxins and renally dose meds for GFR listed above.    - IV NS@75cc/hr (maintenance rate due to weight 80 lb)  - UA negative for UTI  - holding home Lasix, HCTZ for now, restart as kidney function improves    Hyponatremia  Patient has hyponatremia which is uncontrolled,We will aim to correct the sodium by 4-6mEq in 24 hours. We will monitor sodium Every 12 hours. The hyponatremia is due to likely from poor oral intake, cannot rule out SIADH at this point. We will obtain the following studies: Urine sodium, urine osmolality, serum osmolality. We will treat the hyponatremia with IV fluids as follows: NA at 75 cc/hr. The patient's sodium results have been reviewed and are listed below.  Recent Labs   Lab 03/19/24  0510   *       Type 2 myocardial infarction  Troponin elevated but trending down.  Patient denies chest pain.  EKG without acute ischemic changes.  Checking echocardiogram.       Acute metabolic encephalopathy  Likely sec to hypoxia, hyponatremia.  Checking ammonia.  Expand workup if not improved with management above       History of CVA (cerebrovascular accident)  ~ 2018 without significant residual hemiparesis  Continue home aspirin and Plavix.       Severe protein-calorie malnutrition  Had evaluation with CT chest, abdomen and pelvis in 2022 and 2023 which was unremarkable for malignancy.   BMI 12 with significant signs of muscle wasting.   Nutritionist consulted.         PVD (peripheral vascular  disease)  S/p 2 stents in LE's per pt, following with Dr. Barrera   Resume home ASA and Plavix      Essential hypertension  Chronic, controlled. Latest blood pressure and vitals reviewed-     Temp:  [97.8 °F (36.6 °C)]   Pulse:  [79-88]   Resp:  [13-25]   BP: (115-187)/()   SpO2:  [94 %-100 %] .   Home meds for hypertension were reviewed and noted below.   Hypertension Medications               furosemide (LASIX) 20 MG tablet Take 1 tablet (20 mg total) by mouth every other day.    hydroCHLOROthiazide (HYDRODIURIL) 12.5 MG Tab Take 1 tablet (12.5 mg total) by mouth once daily.            While in the hospital, will manage blood pressure as follows; hold home meds due to ROSA ISELA/hyponatremia, started on amlodipine for control.     Will utilize p.r.n. blood pressure medication only if patient's blood pressure greater than 180/110 and he develops symptoms such as worsening chest pain or shortness of breath.        Depression  Patient has persistent depression which is mild and is currently controlled. Will Continue anti-depressant medications. We will not consult psychiatry at this time. Patient does not display psychosis at this time. Continue to monitor closely and adjust plan of care as needed.          VTE Risk Mitigation (From admission, onward)           Ordered     heparin (porcine) injection 5,000 Units  Every 8 hours         03/19/24 1238     Place VIUPL hose  Until discontinued         03/19/24 0430     IP VTE LOW RISK PATIENT  Once         03/19/24 0430                    Discharge Planning   LEONARD: 3/21/2024     Code Status: Full Code   Is the patient medically ready for discharge?:     Reason for patient still in hospital (select all that apply): Patient trending condition                     JULES LARA MD  Department of Hospital Medicine   [facility]

## 2024-03-19 NOTE — ASSESSMENT & PLAN NOTE
Chronic, controlled. Latest blood pressure and vitals reviewed-     Temp:  [97.8 °F (36.6 °C)]   Pulse:  [81-88]   Resp:  [15-22]   BP: (140-178)/()   SpO2:  [94 %-100 %] .   Home meds for hypertension were reviewed and noted below.   Hypertension Medications               furosemide (LASIX) 20 MG tablet Take 1 tablet (20 mg total) by mouth every other day.    hydroCHLOROthiazide (HYDRODIURIL) 12.5 MG Tab Take 1 tablet (12.5 mg total) by mouth once daily.            While in the hospital, will manage blood pressure as follows; Adjust home antihypertensive regimen as follows- see below    Will utilize p.r.n. blood pressure medication only if patient's blood pressure greater than 180/110 and he develops symptoms such as worsening chest pain or shortness of breath.    - holding home lasix and HCTZ d/t ROSA ISELA, restart as kidney function improves  - start Norvasc 10 qd  - hydralazine q6h prn with parameters

## 2024-03-19 NOTE — ASSESSMENT & PLAN NOTE
Chronic, controlled. Latest blood pressure and vitals reviewed-     Temp:  [97.8 °F (36.6 °C)]   Pulse:  [79-88]   Resp:  [13-25]   BP: (115-187)/()   SpO2:  [94 %-100 %] .   Home meds for hypertension were reviewed and noted below.   Hypertension Medications               furosemide (LASIX) 20 MG tablet Take 1 tablet (20 mg total) by mouth every other day.    hydroCHLOROthiazide (HYDRODIURIL) 12.5 MG Tab Take 1 tablet (12.5 mg total) by mouth once daily.            While in the hospital, will manage blood pressure as follows; hold home meds due to ROSA ISELA/hyponatremia, started on amlodipine for control.     Will utilize p.r.n. blood pressure medication only if patient's blood pressure greater than 180/110 and he develops symptoms such as worsening chest pain or shortness of breath.

## 2024-03-19 NOTE — ASSESSMENT & PLAN NOTE
- BMI 12.53  - boost nutrition supplement tidwm    Nutrition consulted. Most recent weight and BMI monitored-     Measurements:  Wt Readings from Last 1 Encounters:   03/18/24 36.3 kg (80 lb)   Body mass index is 12.53 kg/m².    Patient has been screened and assessed by RD.    Malnutrition Type:  Context:    Level:      Malnutrition Characteristic Summary:       Interventions/Recommendations (treatment strategy):

## 2024-03-19 NOTE — HOSPITAL COURSE
3/19- Continue steroids, antibiotics, supplemental O2. Brother at bedside and plan of care discussed in detail.   3/20- Discussed code status with brother and wants him to be DNR. Brother wants to consider hospice- consulted.   3/21- Hospice evaluation completed. Family wants to take the patient home with hospice.

## 2024-03-19 NOTE — ASSESSMENT & PLAN NOTE
Elevated liver function (elevated AST, ALT, GGT)  Viral hepatitis panel negative  F actin Ab positive  Pending liver ultrasound  Avoid ETOH  Referral to GI  Called and discussed with patient and family       Ref. Range 10/20/2020 11:16 10/28/2020 10:08 11/2/2020 15:05 11/24/2020 07:55 12/17/2020 07:09 2/19/2021 08:09 4/1/2021 11:16 5/25/2021 12:00 6/7/2021 07:29 6/22/2021 07:10 6/22/2021 07:11   WBC Latest Ref Range: 4.8 - 10.8 K/uL 5.4       6.2 5.3     RBC Latest Ref Range: 4.70 - 6.10 M/uL 4.80       4.43 (L) 4.57 (L)     Hemoglobin Latest Ref Range: 14.0 - 18.0 g/dL 15.1       14.6 15.3     Hematocrit Latest Ref Range: 42.0 - 52.0 % 45.4       44.8 47.5     MCV Latest Ref Range: 81.4 - 97.8 fL 94.6       101.1 (H) 103.9 (H)     MCH Latest Ref Range: 27.0 - 33.0 pg 31.5       33.0 33.5 (H)     MCHC Latest Ref Range: 33.7 - 35.3 g/dL 33.3 (L)       32.6 (L) 32.2 (L)     RDW Latest Ref Range: 35.9 - 50.0 fL 55.8 (H)       55.0 (H) 55.2 (H)     Platelet Count Latest Ref Range: 164 - 446 K/uL 153 (L)       134 (L) 124 (L)     MPV Latest Ref Range: 9.0 - 12.9 fL 13.0 (H)       13.0 (H) 12.9     Neutrophils-Polys Latest Ref Range: 44.00 - 72.00 % 55.30       61.20 63.00     Neutrophils (Absolute) Latest Ref Range: 1.82 - 7.42 K/uL 3.00       3.82 3.34     Lymphocytes Latest Ref Range: 22.00 - 41.00 % 27.60       23.60 22.50     Lymphs (Absolute) Latest Ref Range: 1.00 - 4.80 K/uL 1.50       1.47 1.19     Monocytes Latest Ref Range: 0.00 - 13.40 % 13.40       11.10 10.00     Monos (Absolute) Latest Ref Range: 0.00 - 0.85 K/uL 0.73       0.69 0.53     Eosinophils Latest Ref Range: 0.00 - 6.90 % 2.40       3.20 3.40     Eos (Absolute) Latest Ref Range: 0.00 - 0.51 K/uL 0.13       0.20 0.18     Basophils Latest Ref Range: 0.00 - 1.80 % 0.70       0.60 0.90     Baso (Absolute) Latest Ref Range: 0.00 - 0.12 K/uL 0.04       0.04 0.05     Immature Granulocytes Latest Ref Range: 0.00 - 0.90 % 0.60       0.30 0.20     Immature  Patient's COPD is with exacerbation noted by continued dyspnea and worsening of baseline hypoxia currently.  Patient is currently on COPD Pathway. Continue scheduled inhalers Steroids, Antibiotics, and Supplemental oxygen and monitor respiratory status closely.     - on COPD pathway  - CXR without acute abnormalities.   - ABG with hypoxia- on 2L O2 at home PRN.   - chest PT q8h   Granulocytes (abs) Latest Ref Range: 0.00 - 0.11 K/uL 0.03       0.02 0.01     Nucleated RBC Latest Units: /100 WBC 0.00       0.00 0.00     NRBC (Absolute) Latest Units: K/uL 0.00       0.00 0.00     Sed Rate Westergren Latest Ref Range: 0 - 20 mm/hour        9      Sodium Latest Ref Range: 135 - 145 mmol/L 131 (L) 137  137 143    138     Potassium Latest Ref Range: 3.6 - 5.5 mmol/L 4.3 4.2  4.5 4.4    4.7     Chloride Latest Ref Range: 96 - 112 mmol/L 99 104  107 110    105     Co2 Latest Ref Range: 20 - 33 mmol/L 19 (L) 22  22 23    25     Anion Gap Latest Ref Range: 7.0 - 16.0  13.0 11.0  8.0 10.0    8.0     Glucose Latest Ref Range: 65 - 99 mg/dL 114 (H) 113 (H)  117 (H) 119 (H)    94     Bun Latest Ref Range: 8 - 22 mg/dL 15 15  14 18    13     Creatinine Latest Ref Range: 0.50 - 1.40 mg/dL 0.70 0.78  0.79 0.73    0.79     GFR If  Latest Ref Range: >60 mL/min/1.73 m 2 >60 >60  >60 >60    >60     GFR If Non  Latest Ref Range: >60 mL/min/1.73 m 2 >60 >60  >60 >60    >60     Calcium Latest Ref Range: 8.5 - 10.5 mg/dL 9.1 8.6  8.4 (L) 8.8    8.9     AST(SGOT) Latest Ref Range: 12 - 45 U/L 838 (H) 451 (H)  40 29    94 (H)     ALT(SGPT) Latest Ref Range: 2 - 50 U/L 902 (H) 426 (H)  43 28    68 (H)     Alkaline Phosphatase Latest Ref Range: 30 - 99 U/L 500 (H) 348 (H)  91 62    111 (H)     Total Bilirubin Latest Ref Range: 0.1 - 1.5 mg/dL 4.9 (H) 3.1 (H)  1.2 0.7    1.1     Albumin Latest Ref Range: 3.2 - 4.9 g/dL 3.4 3.4  3.5 3.6    3.7     Total Protein Latest Ref Range: 6.0 - 8.2 g/dL 7.4 7.3  6.4 6.6    7.2     Globulin Latest Ref Range: 1.9 - 3.5 g/dL 4.0 (H) 3.9 (H)  2.9 3.0    3.5     A-G Ratio Latest Units: g/dL 0.9 0.9  1.2 1.2    1.1     CPK Total Latest Ref Range: 0 - 154 U/L  76            LDH Total Latest Ref Range: 107 - 266 U/L  380 (H)            Gamma Gt Latest Ref Range: 7 - 51 U/L          211 (H)    Iron Latest Ref Range: 50 - 180 ug/dL          142    Total Iron  Binding Latest Ref Range: 250 - 450 ug/dL          326    % Saturation Latest Ref Range: 15 - 55 %          44    Unsat Iron Binding Latest Ref Range: 110 - 370 ug/dL          184    Glycohemoglobin Latest Ref Range: 4.0 - 5.6 %         5.4     Estim. Avg Glu Latest Units: mg/dL         108     Methylmalonic Acid, Serum Latest Ref Range: 0.00 - 0.40 umol/L           0.16   Fasting Status Unknown     Fasting    Fasting     Cholesterol,Tot Latest Ref Range: 100 - 199 mg/dL 189    263 (H) 159   159     Triglycerides Latest Ref Range: 0 - 149 mg/dL 197 (H)    309 (H) 158 (H)   104     HDL Latest Ref Range: >=40 mg/dL 20 (A)    50 51   49     LDL Latest Ref Range: <100 mg/dL 130 (H)    151 (H) 76   89     Alkaline Phosphatase Latest Ref Range: 40 - 120 U/L  374 (H)        102    Bone Fractions Latest Ref Range: 0 - 55 U/L  71 (H)        44    Liver Fractions Latest Ref Range: 0 - 94 U/L  303 (H)        58    Alk Phos Other Calc Latest Units: U/L  0        0    INR Latest Ref Range: 0.87 - 1.13   1.04            PT Latest Ref Range: 12.0 - 14.6 sec  13.9            Ferritin Latest Ref Range: 22.0 - 322.0 ng/mL          170.0    Folate -Folic Acid Latest Ref Range: >4.0 ng/mL          11.1    Immunoglobulin A Latest Ref Range: 68.0 - 408.0 mg/dL          437.0 (H)    Immunoglobulin G Latest Ref Range: 768.0 - 1632.0 mg/dL          1495.0    Prostatic Specific Antigen Tot Latest Ref Range: 0.00 - 4.00 ng/mL         1.51     t-TG IgA Latest Ref Range: 0 - 3 U/mL          2    Vitamin B12 -True Cobalamin Latest Ref Range: 211 - 911 pg/mL          911    TSH Latest Ref Range: 0.380 - 5.330 uIU/mL         3.800     Hep B Surface Antibody Quant Latest Ref Range: 0.00 - 10.00 mIU/mL          <3.50    Hepatitis B Surface Antigen Latest Ref Range: Non-Reactive           Non-Reactive    Hepatitis B Cors Ab,IgM Latest Ref Range: Non-Reactive           Non-Reactive    Hepatitis Be Antigen Latest Ref Range: Negative           Negative     Hepatitis BE Antibody Latest Ref Range: Negative           Negative    Hepatitis C Antibody Latest Ref Range: Non-Reactive           Non-Reactive    Rheumatoid Factor -Neph- Latest Ref Range: 0 - 14 IU/mL        <10      Stat C-Reactive Protein Latest Ref Range: 0.00 - 0.75 mg/dL        0.34      Anti-Mitochondrial Ab Latest Ref Range: 0.0 - 24.9 Units          1.5    Smooth Muscle Abs Latest Ref Range: <1:20           <1:20    F-Actin Ab, IgG Latest Ref Range: 0 - 19 Units          33 (H)    Ccp Antibodies Latest Ref Range: 0 - 19 Units        3      Antinuclear Antibody Latest Ref Range: None Detected         None Detected

## 2024-03-20 PROBLEM — Z71.89 GOALS OF CARE, COUNSELING/DISCUSSION: Status: ACTIVE | Noted: 2024-01-01

## 2024-03-20 PROBLEM — B37.0 ORAL THRUSH: Status: ACTIVE | Noted: 2024-01-01

## 2024-03-20 PROBLEM — T07.XXXA MULTIPLE WOUNDS: Status: ACTIVE | Noted: 2024-01-01

## 2024-03-20 PROBLEM — R13.12 OROPHARYNGEAL DYSPHAGIA: Status: ACTIVE | Noted: 2024-01-01

## 2024-03-20 PROBLEM — F10.932 ALCOHOL WITHDRAWAL SYNDROME WITH PERCEPTUAL DISTURBANCE: Status: ACTIVE | Noted: 2024-01-01

## 2024-03-20 NOTE — PROGRESS NOTES
[facility] - [address] [place] [address] Medicine  Progress Note    Patient Name: Dereck Delgado  MRN: [id number]  Patient Class: IP- Inpatient   Admission Date: 3/18/2024  Length of Stay: 0 days  Attending Physician: Andry Moncada MD  Primary Care Provider: Jose Manuel Castro MD        Subjective:     Principal Problem:COPD exacerbation        HPI:  Patient is a 65yo male with a PMH of COPD on 2L NC O2 at home, CVA, HTN, PVD s/p 2 stents placement, depression who presents to [facility] ED via EMS with SOB, cough, confusion, weakness. Patient was sent to the ED from Dr. Skaggs's office 4 days ago on 3/14/24 for his symptoms along with low spO2. He was seen in the ED and was sent home to see if OP therapy would work. Today he returned with increased confusion, weakness, and SOB. Patient's brother reports decreased PO intake since ED visit and decreased mobility since. Denies difficulty swallowing or history of choking on food. He hasn't been out of the house due to the decreased activity, SOB, CURTIS. Endorses confusion (patient was cleaning carpet while hands were in the air per brother). Endorses chronic weight loss in the past 3 years (used to weigh 110 lb but now 80 lbs) since losing his wife. Endorses chronic night sweats all his life. Patient sees Dr. Castro as his PCP and Dr. Kim as his pulmonologist.     Upon presentation, VS remarkable for 159/96, 22. Labs remarkable for WBC 15.53, Na 127, Cl 89, K 3.3, Bun/Cr 60/1.5, GFR 49, glucose 70. BNP 1761, troponins 69.6, 64.1. EKG SR 86. CXR official read pending. Patient is admitted to hospital medicine for further management and care.    Overview/Hospital Course:  3/19- Continue steroids, antibiotics, supplemental O2. Brother at bedside and plan of care discussed in detail.   3/20- Discussed code status with brother and wants him to be DNR. Brother wants to consider hospice- consulted.     Interval History: Patient seen and examined at the  bedside, continues to remain confused with tremors.     Review of Systems   Respiratory:  Positive for shortness of breath.    Neurological:  Positive for tremors and weakness.   Psychiatric/Behavioral:  Positive for confusion.      Objective:     Vital Signs (Most Recent):  Temp: 97.5 °F (36.4 °C) (03/20/24 1009)  Pulse: 95 (03/20/24 1420)  Resp: 18 (03/20/24 1420)  BP: (!) 149/85 (03/20/24 1420)  SpO2: 95 % (03/20/24 1420) Vital Signs (24h Range):  Temp:  [97.5 °F (36.4 °C)-97.9 °F (36.6 °C)] 97.5 °F (36.4 °C)  Pulse:  [] 95  Resp:  [16-21] 18  SpO2:  [74 %-97 %] 95 %  BP: (136-163)/() 149/85     Weight: 36.6 kg (80 lb 11 oz)  Body mass index is 12.64 kg/m².    Intake/Output Summary (Last 24 hours) at 3/20/2024 1430  Last data filed at 3/20/2024 0745  Gross per 24 hour   Intake 100 ml   Output 10 ml   Net 90 ml           Physical Exam  Constitutional:       General: He is awake.      Appearance: He is cachectic. He is ill-appearing.   HENT:      Head: Normocephalic.      Mouth/Throat:      Mouth: Mucous membranes are dry.   Eyes:      Extraocular Movements: Extraocular movements intact.   Cardiovascular:      Rate and Rhythm: Normal rate and regular rhythm.   Pulmonary:      Effort: Pulmonary effort is normal. No respiratory distress.      Breath sounds: Rhonchi present.   Abdominal:      General: Abdomen is flat. There is no distension.      Tenderness: There is no abdominal tenderness.   Musculoskeletal:         General: No swelling.   Neurological:      General: No focal deficit present.      Mental Status: He is disoriented.             Significant Labs: All pertinent labs within the past 24 hours have been reviewed.    Significant Imaging: I have reviewed all pertinent imaging results/findings within the past 24 hours.    Assessment/Plan:      * COPD exacerbation  Patient's COPD is with exacerbation noted by continued dyspnea and worsening of baseline hypoxia currently.  Patient is currently on  COPD Pathway. Continue scheduled inhalers Steroids, Antibiotics, and Supplemental oxygen and monitor respiratory status closely.     - on COPD pathway, steroids transitioned to PO.   - CXR without acute abnormalities.   - ABG with hypoxia- on 2L O2 at home PRN.   - chest PT q8h    ROSA ISELA (acute kidney injury)  Patient with acute kidney injury/acute renal failure likely due to pre-renal azotemia due to dehydration ROSA ISELA is currently worsening. Baseline creatinine  1.18 on 3/14/24  - Labs reviewed- Renal function/electrolytes with Estimated Creatinine Clearance: 24.9 mL/min (A) (based on SCr of 1.53 mg/dL (H)). according to latest data. Monitor urine output and serial BMP and adjust therapy as needed. Avoid nephrotoxins and renally dose meds for GFR listed above.    - continue IV fluids.   - UA negative for UTI  - holding home Lasix, HCTZ for now, restart as kidney function improves    Hyponatremia  Patient has hyponatremia which is uncontrolled,We will aim to correct the sodium by 4-6mEq in 24 hours. We will monitor sodium Every 12 hours. The hyponatremia is due to likely from poor oral intake, cannot rule out SIADH at this point. We will obtain the following studies: Urine sodium, urine osmolality, serum osmolality. We will treat the hyponatremia with IV fluids as follows: continue fluids via banana bag The patient's sodium results have been reviewed and are listed below.  Recent Labs   Lab 03/20/24  0636   *         Type 2 myocardial infarction  Troponin elevated but trending down.  Patient denies chest pain.  EKG without acute ischemic changes.  Echo    Result Date: 3/20/2024    Left Ventricle: Mild global hypokinesis present. There is low normal   systolic function with a visually estimated ejection fraction of 50 - 55%.   Ejection fraction by visual approximation is 50%. There is normal   diastolic function.    Right Ventricle: Normal right ventricular cavity size.    Aortic Valve: The aortic valve is a  trileaflet valve. There is moderate   aortic valve sclerosis. Mildly calcified cusps.    Mitral Valve: There is mild bileaflet sclerosis.    IVC/SVC: Normal venous pressure at 3 mmHg.    Pericardium: There is a trivial effusion.        No ischemic workup required for now.     Acute metabolic encephalopathy  Likely sec to hypoxia, hyponatremia and alcohol withdrawal.   Ammonia < 10.   CIWA protocol initiated.       Multiple wounds  Wound care consulted.  Checking B/L feet x-ray to rule out osteomyelitis.      Oropharyngeal dysphagia  Suspect related to mental status but thrush might be contributing.   SLP consulted.       Alcohol withdrawal syndrome with perceptual disturbance  Monitor with CIWA protocol, PRN Ativan.  Avoiding scheduled Ativan to avoid over sedation but require to start it.   Thiamine (high dose), MV and folate started.      Goals of care, counseling/discussion  Advance Care Planning    Code Status  In light of the patients advanced and life limiting illness,I engaged the the family in a voluntary conversation about the patient's preferences for care  at the very end of life. The patient wishes to have a natural, peaceful death.  Along those lines, the patient does not wish to have CPR or other invasive treatments performed when his heart and/or breathing stops. I communicated to the patient that a DNR order would be placed in his medical record to reflect this preference.  I spent a total of 30 minutes engaging the patient in this advance care planning discussion.        Family is interested in hospice- consulted.     Oral thrush  Started on fluconazole.       History of CVA (cerebrovascular accident)  ~ 2018 without significant residual hemiparesis  Continue home aspirin and Plavix.       Severe protein-calorie malnutrition  Had evaluation with CT chest, abdomen and pelvis in 2022 and 2023 which was unremarkable for malignancy.   BMI 12 with significant signs of muscle wasting.   Checking  HIV.  Nutritionist consulted.   Poor prognosis.         PVD (peripheral vascular disease)  S/p 2 stents in LE's per pt, following with Dr. Diaz.  Checking arterial dopplers with JADEN given multiple lower extremity wounds.   Resume home ASA and Plavix      Essential hypertension  Chronic, controlled. Latest blood pressure and vitals reviewed-     Temp:  [97.5 °F (36.4 °C)-97.9 °F (36.6 °C)]   Pulse:  []   Resp:  [16-21]   BP: (136-163)/()   SpO2:  [74 %-97 %] .   Home meds for hypertension were reviewed and noted below.   Hypertension Medications               furosemide (LASIX) 20 MG tablet Take 1 tablet (20 mg total) by mouth every other day.    hydroCHLOROthiazide (HYDRODIURIL) 12.5 MG Tab Take 1 tablet (12.5 mg total) by mouth once daily.            While in the hospital, will manage blood pressure as follows; hold home meds due to ROSA ISELA/hyponatremia, started on amlodipine for control.     Will utilize p.r.n. blood pressure medication only if patient's blood pressure greater than 180/110 and he develops symptoms such as worsening chest pain or shortness of breath.        Depression  Patient has persistent depression which is mild and is currently controlled. Will Continue anti-depressant medications. We will not consult psychiatry at this time. Patient does not display psychosis at this time. Continue to monitor closely and adjust plan of care as needed.          VTE Risk Mitigation (From admission, onward)           Ordered     heparin (porcine) injection 5,000 Units  Every 8 hours         03/19/24 1238     Place VIPUL hose  Until discontinued         03/19/24 0430     IP VTE LOW RISK PATIENT  Once         03/19/24 0430                    Discharge Planning   LEONARD: 3/22/2024     Code Status: DNR   Is the patient medically ready for discharge?:     Reason for patient still in hospital (select all that apply): Patient trending condition, PT / OT recommendations, and Pending disposition  Discharge Plan A:  Home with family                  UJLES LARA MD  Department of Hospital Medicine   [facility] - [address] [place] [address]

## 2024-03-20 NOTE — ASSESSMENT & PLAN NOTE
Advance Care Planning     Code Status  In light of the patients advanced and life limiting illness,I engaged the the family in a voluntary conversation about the patient's preferences for care  at the very end of life. The patient wishes to have a natural, peaceful death.  Along those lines, the patient does not wish to have CPR or other invasive treatments performed when his heart and/or breathing stops. I communicated to the patient that a DNR order would be placed in his medical record to reflect this preference.  I spent a total of 30 minutes engaging the patient in this advance care planning discussion.        Family is interested in hospice- consulted.

## 2024-03-20 NOTE — ASSESSMENT & PLAN NOTE
S/p 2 stents in LE's per pt, following with Dr. Diaz.  Checking arterial dopplers with JADEN given multiple lower extremity wounds.   Resume home ASA and Plavix

## 2024-03-20 NOTE — ASSESSMENT & PLAN NOTE
Troponin elevated but trending down.  Patient denies chest pain.  EKG without acute ischemic changes.  Echo    Result Date: 3/20/2024    Left Ventricle: Mild global hypokinesis present. There is low normal   systolic function with a visually estimated ejection fraction of 50 - 55%.   Ejection fraction by visual approximation is 50%. There is normal   diastolic function.    Right Ventricle: Normal right ventricular cavity size.    Aortic Valve: The aortic valve is a trileaflet valve. There is moderate   aortic valve sclerosis. Mildly calcified cusps.    Mitral Valve: There is mild bileaflet sclerosis.    IVC/SVC: Normal venous pressure at 3 mmHg.    Pericardium: There is a trivial effusion.        No ischemic workup required for now.

## 2024-03-20 NOTE — ASSESSMENT & PLAN NOTE
Patient with acute kidney injury/acute renal failure likely due to pre-renal azotemia due to dehydration ROSA ISELA is currently worsening. Baseline creatinine  1.18 on 3/14/24  - Labs reviewed- Renal function/electrolytes with Estimated Creatinine Clearance: 24.9 mL/min (A) (based on SCr of 1.53 mg/dL (H)). according to latest data. Monitor urine output and serial BMP and adjust therapy as needed. Avoid nephrotoxins and renally dose meds for GFR listed above.    - continue IV fluids.   - UA negative for UTI  - holding home Lasix, HCTZ for now, restart as kidney function improves

## 2024-03-20 NOTE — ASSESSMENT & PLAN NOTE
Patient's COPD is with exacerbation noted by continued dyspnea and worsening of baseline hypoxia currently.  Patient is currently on COPD Pathway. Continue scheduled inhalers Steroids, Antibiotics, and Supplemental oxygen and monitor respiratory status closely.     - on COPD pathway, steroids transitioned to PO.   - CXR without acute abnormalities.   - ABG with hypoxia- on 2L O2 at home PRN.   - chest PT q8h

## 2024-03-20 NOTE — PT/OT/SLP EVAL
Physical Therapy Evaluation and Treatment    Patient Name: Dereck Delgado   MRN: [id number]  Recent Surgery: * No surgery found *      Recommendations:     Discharge Recommendations: Moderate Intensity Therapy (vs low intensity)   Discharge Equipment Recommendations: to be determined by next level of care   Barriers to discharge: Increased level of assist, Decreased caregiver support, and Ongoing medical treatment    Assessment:     Dereck Delgado is a 64 y.o. male admitted with a medical diagnosis of COPD exacerbation. He presents with the following impairments/functional limitations: weakness, impaired endurance, impaired self care skills, impaired functional mobility, gait instability, impaired balance, pain, decreased ROM, impaired skin, impaired cardiopulmonary response to activity.   Patient demonstrated delayed processing and execution of commands initially but improving interaction as evaluation progressed. Mild ataxia/decreased coordination and easy fatigue. Patient unsafe to return to independent living situation. D/c disposition of swing bed vs home with brother and home health to be determined by rate of recovery.    Rehab Prognosis: Good; patient would benefit from acute PT services to address these deficits and reach maximum level of function.    Plan:     During this hospitalization, patient to be seen 5 x/week to address the above listed problems via gait training, therapeutic activities, therapeutic exercises    Plan of Care Expires: 04/20/24    Subjective     Chief Complaint: COPD exacerbation   Patient Comments/Goals: Patient confused with delayed processing/execution of commands  Pain/Comfort:  Pain Rating 1: 0/10 (at rest but up to 6/10 during initial stance)  Location - Side 1: Bilateral  Location 1: ankle  Pain Addressed 1: Pre-medicate for activity, Reposition, Distraction, Cessation of Activity  Pain Rating Post-Intervention 1: 0/10    Social History:  Living Environment: Patient lives  alone in an unknown style of home. Patient may be going to live with his brother at d/c.  Prior Level of Function: Prior to admission, patient was modified independent; patient has lost 30 lb over the last 3 years since his wife passed away  Equipment Used at Home: cane, straight, oxygen  DME owned (not currently used):  unknown  Assistance Upon Discharge:  brother and home health vs swing bed    Objective:     Communicated with Myesha Almaraz RN prior to session. Patient found supine with peripheral IV, oxygen, bed alarm upon PT entry to room.    General Precautions: Standard, fall, respiratory   Orthopedic Precautions: N/A   Braces: N/A    Respiratory Status: Nasal cannula, flow 2 L/min    Exams:  Cognition: Patient is oriented to Person and inconsistent responses to questions  RLE ROM: WFL  RLE Strength: Deficits: 4/5  LLE ROM: WFL  LLE Strength: Deficits: 4/5  Sensation:    -       Intact  Skin Integrity/Edema:     -       Skin integrity: multiple small abrasions/bruises B UE/LE; dressed wounds B ankles    Functional Mobility:  Gait belt applied - Yes  Bed Mobility  Supine to Sit: stand by assistance; increased effort  Sit to Supine: stand by assistance; increased effort  Transfers  Sit to Stand: minimum assistance of 1-2 and verbal cues for sequencing with hand-held assist and rolling walker and with cues for hand placement and foot placement  Gait  Patient ambulated 30' with rolling walker and contact guard assistance and minimum assistance. Patient demonstrates occasional unsteady gait, decreased step length, narrow base of support, and decreased foot clearance. Decreased ability to manage walker on turns. All lines remained intact throughout ambulation trail.  Balance  Sitting: stand by assistance  Standing: contact guard assistance and minimum assistance using RW      Therapeutic Activities and Exercises:   Patient educated on role of acute care PT and PT POC, safety while in hospital including calling  nurse for mobility, and call light usage  Patient educated about importance of OOB mobility and remaining up in chair most of the day.  Sing bed vs home with home health depending on progress    AM-PAC 6 CLICK MOBILITY  Total Score:16    Patient left HOB elevated with all lines intact, call button in reach, and RN notified.    GOALS:   Multidisciplinary Problems       Physical Therapy Goals          Problem: Physical Therapy    Goal Priority Disciplines Outcome Goal Variances Interventions   Physical Therapy Goal     PT, PT/OT Ongoing, Progressing     Description: Short Term Goals to be met by: 4/3/2024    Patient will increase functional independence with mobility by performin. Supine to sit with independently  2. Sit to stand transfer with independently using lowest level of assistive device  3. Bed to chair transfer with independently using lowest level of assistive device  4. Gait  x 100 feet with independently using lowest level of assistive device  5. Lower extremity exercise program x30 reps per handout, with assistance as needed    Long Term Goals to be met by: 2024    Pt will regain full independent functional mobility with lowest level of assistive device to return to home situation and prior activities of daily living.                        History:     Past Medical History:   Diagnosis Date    COPD (chronic obstructive pulmonary disease)     CVA (cerebral vascular accident) 2018    Depression     Essential hypertension 2022    Nicotine dependence     PVD (peripheral vascular disease) 10/20/2022       Past Surgical History:   Procedure Laterality Date    EYE SURGERY         Time Tracking:     PT Received On: 24  PT Start Time: 1123  PT Stop Time: 1136  PT Total Time (min): 13 min     Billable Minutes: Evaluation Low complexity    3/20/2024

## 2024-03-20 NOTE — ASSESSMENT & PLAN NOTE
Monitor with CIWA protocol, PRN Ativan.  Avoiding scheduled Ativan to avoid over sedation but require to start it.   Thiamine (high dose), MV and folate started.

## 2024-03-20 NOTE — PLAN OF CARE
Problem: Gas Exchange Impaired  Goal: Optimal Gas Exchange  Outcome: Ongoing, Not Progressing

## 2024-03-20 NOTE — PT/OT/SLP EVAL
Speech Language Pathology Evaluation  Bedside Swallow    Patient Name:  Dereck Delgado   MRN:  [id number]  Admitting Diagnosis: COPD exacerbation    Recommendations:                 General Recommendations:  Follow-up not indicated  Diet recommendations:  Mechanical soft, Chopped meat, Thin, Other (Comment) (Please add boost/ensure to all meal trays for nutritional support.)   Aspiration Precautions:  Encourage good po intake; patient may need an appetite stimulant, 1 bite/sip at a time, Alternating bites/sips, HOB to 90 degrees, Remain upright 30 minutes post meal, Small bites/sips, and Standard aspiration precautions   General Precautions: Standard,    Communication strategies:   Patient able to answer some simple questions but not making much sense in conversation.    Assessment:     Dereck Delgado is a 64 y.o. male with an SLP diagnosis of Dysphagia.  He presents with no difficulties with swallow function. Patient has thrush in his mouth and says it hurts/burns when he swallows. He also has a poor appetite.    History:     Past Medical History:   Diagnosis Date    COPD (chronic obstructive pulmonary disease)     CVA (cerebral vascular accident) 2018    Depression     Essential hypertension 07/22/2022    Nicotine dependence     PVD (peripheral vascular disease) 10/20/2022       Past Surgical History:   Procedure Laterality Date    EYE SURGERY         Social History: Patient lives with his brother and has been staying some with a woman that he met (per patient's brother).    Prior Intubation HX:  n/a    Modified Barium Swallow: n/a    Chest X-Rays: see chart    Prior diet: Unsure of what or how much patient has been eating at home.    Occupation/hobbies/homemaking: not stated.    Subjective     Patient lying in bed awake. Patient had just returned to his room from CT. Patient's brother present at bedside. Patient's nurse present to hang fluids. Patient agreeable to BEDSIDE SWALLOW EVALUATION.   Patient goals:  to get better     Pain/Comfort:       Respiratory Status: Nasal cannula, flow see chart L/min    Objective:     Oral Musculature Evaluation  Dentition: other (see comments) (Pt wears top dentures but does not have them with him. Pt also has thrush all in his mouth and quite possibly down his throat.)  Secretion Management: adequate  Mucosal Quality: adequate  Oral Labial Strength and Mobility: WFL  Lingual Strength and Mobility: WFL    Bedside Swallow Eval:   Consistencies Assessed:  Thin liquids No difficulties noted with small trials of water via a spoon or a straw. No overt s/s of aspiration noted with any trials.   Puree No difficulties noted with small bites of pudding. No overt s/s of aspiration noted. Patient ate 5-6 bites and then said that was enough.       Oral Phase:   Patient does have visible thrush in his mouth and says it is very uncomfortable.  WFL    Pharyngeal Phase:   no overt clinical signs/symptoms of aspiration  no overt clinical signs/symptoms of pharyngeal dysphagia    Compensatory Strategies  None    Treatment: Rec a mechanical soft consistency diet with chopped meats and thin liquids as tolerated. Please add boost/ensure to meal trays for nutritional support.     Goals:   Multidisciplinary Problems       SLP Goals       Not on file                    Plan:     Patient to be seen:      Plan of Care expires:     Plan of Care reviewed with:      SLP Follow-Up:  No       Discharge recommendations:      Barriers to Discharge:  Decreased Care Giver Support    Time Tracking:     SLP Treatment Date:      Speech Start Time:  1435  Speech Stop Time:  1500     Speech Total Time (min):  25 min    Billable Minutes: Eval Swallow and Oral Function 25 03/20/2024

## 2024-03-20 NOTE — PLAN OF CARE
SS consulted on pt for hospice. Dr Echevarria already spoke with pt's brother at bedside and they are agreeable to hospice. Referral will be faxed to [facility]. SS left message for Dez Walden to see if he can come and speak with pt's brother

## 2024-03-20 NOTE — PROGRESS NOTES
03/20/24 1201   Wound Care Follow Up   Wound Care Follow-up? Yes   Wound Care- Next Visit Date 03/25/24   Follow Up Plan Chiqui POC

## 2024-03-20 NOTE — PLAN OF CARE
Problem: Physical Therapy  Goal: Physical Therapy Goal  Description: Short Term Goals to be met by: 4/3/2024    Patient will increase functional independence with mobility by performin. Supine to sit with independently  2. Sit to stand transfer with independently using lowest level of assistive device  3. Bed to chair transfer with independently using lowest level of assistive device  4. Gait  x 100 feet with independently using lowest level of assistive device  5. Lower extremity exercise program x30 reps per handout, with assistance as needed    Long Term Goals to be met by: 2024    Pt will regain full independent functional mobility with lowest level of assistive device to return to home situation and prior activities of daily living.   Outcome: Ongoing, Progressing     Patient demonstrated delayed processing and execution of commands initially but improving interaction as evaluation progressed. Mild ataxia/decreased coordination and easy fatigue. Patient unsafe to return to independent living situation. D/c disposition of swing bed vs home with brother and home health to be determined by rate of recovery.

## 2024-03-20 NOTE — PROGRESS NOTES
[facility] Telemetry  Wound Care    Patient Name:  Dereck Delgado   MRN:  [id number]  Date: 3/20/2024  Diagnosis: COPD exacerbation    History:     Past Medical History:   Diagnosis Date    COPD (chronic obstructive pulmonary disease)     CVA (cerebral vascular accident) 2018    Depression     Essential hypertension 07/22/2022    Nicotine dependence     PVD (peripheral vascular disease) 10/20/2022       Social History     Socioeconomic History    Marital status:    Occupational History     Comment: disabled   Tobacco Use    Smoking status: Every Day     Current packs/day: 0.75     Average packs/day: 0.8 packs/day for 53.0 years (39.8 ttl pk-yrs)     Types: Cigarettes     Passive exposure: Current    Smokeless tobacco: Former     Types: Snuff, Chew    Tobacco comments:     Maybe 3 cig since 3/11/24   Substance and Sexual Activity    Alcohol use: Yes     Comment: daily; 3 beers a day; 3 shots of liquor a day    Drug use: Yes     Types: Marijuana     Comment: 3 times daily    Sexual activity: Never     Social Determinants of Health     Financial Resource Strain: Low Risk  (3/19/2024)    Overall Financial Resource Strain (CARDIA)     Difficulty of Paying Living Expenses: Not hard at all   Food Insecurity: No Food Insecurity (3/19/2024)    Hunger Vital Sign     Worried About Running Out of Food in the Last Year: Never true     Ran Out of Food in the Last Year: Never true   Transportation Needs: No Transportation Needs (3/19/2024)    PRAPARE - Transportation     Lack of Transportation (Medical): No     Lack of Transportation (Non-Medical): No   Physical Activity: Inactive (3/19/2024)    Exercise Vital Sign     Days of Exercise per Week: 0 days     Minutes of Exercise per Session: 0 min   Stress: No Stress Concern Present (3/19/2024)    [place] [employer] - Occupational Stress Questionnaire     Feeling of Stress : Not at all   Social Connections: Unknown (3/19/2024)     Social Connection and Isolation Panel [NHANES]     Frequency of Communication with Friends and Family: More than three times a week     Frequency of Social Gatherings with Friends and Family: More than three times a week     Attends Hindu Services: Never     Active Member of Clubs or Organizations: No     Attends Club or Organization Meetings: Never   Housing Stability: Low Risk  (3/19/2024)    Housing Stability Vital Sign     Unable to Pay for Housing in the Last Year: No     Number of Places Lived in the Last Year: 1     Unstable Housing in the Last Year: No       Precautions:     Allergies as of 03/18/2024    (No Known Allergies)       WOC Assessment Details/Treatment        03/20/24 1117   WOCN Assessment   WOCN Total Time (mins) 120   Visit Date 03/20/24   Visit Time 1000   Consult Type New   WOCN Speciality Wound   Wound cellulitis;other;At risk for pressure Injury;yeast  (trauma)   Number of Wounds 6   Intervention chart review;assessed;changed;applied;orders;team conference   Teaching on-going;complication   Skin Interventions   Skin Protection incontinence pads utilized   Pressure Injury Prevention    Heel protection technique Heel boot   Heel preventative measures Peel back dressing/boot, assess skin and reapply        Altered Skin Integrity 03/18/24 2300 Right anterior Toe, first   Date First Assessed/Time First Assessed: 03/18/24 2300   Altered Skin Integrity Present on Admission - Did Patient arrive to the hospital with altered skin?: yes  Side: Right  Orientation: anterior  Location: Toe, first   Wound Image    Description of Altered Skin Integrity Purple or maroon localized area of discolored intact skin or non-intact skin or a blood-filled blister.  (brother states this is from resting L foot against R while sleeping)   Dressing Appearance Open to air   Drainage Amount None   Appearance Intact;Purple;Maroon   Periwound Area Intact   Wound Edges Undefined   Off Loading Other (see  comments)  (heel boots)        Altered Skin Integrity 03/18/24 2300 Right anterior Ankle   Date First Assessed/Time First Assessed: 03/18/24 2300   Altered Skin Integrity Present on Admission - Did Patient arrive to the hospital with altered skin?: yes  Side: Right  Orientation: anterior  Location: Ankle   Wound Image    Dressing Appearance Intact;Dry   Appearance Tan;Black;Necrotic;Eschar;Fibrin;Dry   Periwound Area Ecchymotic;Edematous;Redness   Wound Edges Defined;Irregular   Wound Length (cm) 3 cm   Wound Width (cm) 1.5 cm   Wound Surface Area (cm^2) 4.5 cm^2   Care Cleansed with:;Antimicrobial agent;Wound cleanser;Applied:;Skin Barrier   Dressing Other (comment);Non-adherent;Gauze;Rolled gauze  (Xeroform)   Periwound Care Other (see comments);Skin barrier film applied;Dry periwound area maintained;Absorptive dressing applied  (Vashe)   Dressing Change Due 03/21/24        Altered Skin Integrity 03/18/24 2300 Left lateral Foot #1   Date First Assessed/Time First Assessed: 03/18/24 2300   Altered Skin Integrity Present on Admission - Did Patient arrive to the hospital with altered skin?: yes  Side: Left  Orientation: lateral  Location: Foot  Wound Number: #1   Wound Image    Dressing Appearance Intact;Moist drainage   Drainage Amount Scant   Appearance Maroon;Black;Necrotic   Black (%), Wound Tissue Color 100 %   Periwound Area Ecchymotic;Dry;Redness   Wound Edges Defined;Irregular;Open   Wound Length (cm) 1.7 cm   Wound Width (cm) 1.5 cm   Wound Depth (cm) 0.1 cm   Wound Volume (cm^3) 0.255 cm^3   Wound Surface Area (cm^2) 2.55 cm^2   Care Cleansed with:;Antimicrobial agent;Wound cleanser;Applied:;Skin Barrier   Dressing Other (comment);Non-adherent;Gauze;Rolled gauze  (Xeroform)   Periwound Care Other (see comments);Skin barrier film applied;Dry periwound area maintained;Absorptive dressing applied  (Vashe)        Altered Skin Integrity 03/19/24 2017 Right posterior Elbow Abrasion(s)   Date First Assessed/Time  First Assessed: 03/19/24 2017   Altered Skin Integrity Present on Admission - Did Patient arrive to the hospital with altered skin?: yes  Side: Right  Orientation: posterior  Location: Elbow  Primary Wound Type: Abrasion(s)   Wound Image    Dressing Appearance Intact   Drainage Amount Scant   Drainage Characteristics/Odor Serous   Appearance Pink;Tan;Fibrin   Periwound Area Dry   Wound Edges Defined;Open   Wound Length (cm) 1.5 cm   Wound Width (cm) 2 cm   Wound Depth (cm) 0.1 cm   Wound Volume (cm^3) 0.3 cm^3   Wound Surface Area (cm^2) 3 cm^2   Care Cleansed with:;Antimicrobial agent;Wound cleanser;Applied:;Skin Barrier   Dressing Other (comment);Non-adherent;Gauze;Cast padding  (Xeroform)   Periwound Care Other (see comments);Skin barrier film applied;Dry periwound area maintained;Absorptive dressing applied  (Vashe)   Dressing Change Due 03/22/24        Altered Skin Integrity 03/18/24 1736 Left lateral Foot #2 Ulceration   Date First Assessed/Time First Assessed: 03/18/24 1736   Altered Skin Integrity Present on Admission - Did Patient arrive to the hospital with altered skin?: yes  Side: Left  Orientation: lateral  Location: Foot  Wound Number: #2  Primary Wound Type: ...   Wound Image    Dressing Appearance Intact;Moist drainage   Drainage Amount Scant   Drainage Characteristics/Odor Sanguineous   Appearance Black;Necrotic;Eschar;Dry;Not granulating   Black (%), Wound Tissue Color 100 %   Periwound Area Intact;Ecchymotic;Dry;Redness   Wound Edges Defined;Irregular;Open   Wound Length (cm) 1.5 cm   Wound Width (cm) 2.5 cm   Wound Depth (cm) 0.1 cm   Wound Volume (cm^3) 0.375 cm^3   Wound Surface Area (cm^2) 3.75 cm^2   Care Cleansed with:;Antimicrobial agent;Wound cleanser;Applied:;Skin Barrier   Dressing Changed;Non-adherent;Gauze;Rolled gauze  (xeroform)   Periwound Care Other (see comments);Skin barrier film applied;Dry periwound area maintained;Absorptive dressing applied  (Vashe)   Off Loading Other (see  "comments)  (Heel boot)   Dressing Change Due 03/21/24        Altered Skin Integrity 03/18/24 1735 Left posterior Ankle   Date First Assessed/Time First Assessed: 03/18/24 1735   Altered Skin Integrity Present on Admission - Did Patient arrive to the hospital with altered skin?: yes  Side: Left  Orientation: posterior  Location: Ankle   Wound Image    Dressing Appearance Intact;Dry   Appearance Maroon;Black;Gray;Eschar;Dry   Periwound Area Denuded;Ecchymotic;Gray;Redness   Wound Edges Undefined   Care Cleansed with:;Antimicrobial agent;Wound cleanser;Applied:;Skin Barrier   Dressing Other (comment);Non-adherent;Gauze;Rolled gauze  (Xeroform)   Periwound Care Other (see comments);Skin barrier film applied;Dry periwound area maintained;Absorptive dressing applied  (Vashe)   Off Loading Other (see comments)  (Heel boot)   Dressing Change Due 03/21/24     WOC Team consulted for "Altered Skin Integrity; Wounds - left and right ankles and elbows"    Narrative: Pt alert and oriented. Very fidgety.  Tries to put urinal to his mouth.  Brother at bedside.      Active Wounds and Recommendations:      Traumatic Ulceration, R lateral malleolus, non healing -       Silvadene     Ulcerations L lateral foot, posterior ankle, unknown etiology -       Silvadene    Goals for Wound Healing: Promote moist wound healing, Manage drainage, Apply antimicrobial, Removal of slough/eschar, Reduce pain, Reduce bioburden, and Educate on proper wound management post D/C     Barriers to Wound Healing: multiple co-morbidities poor vascular supply dry wound bed decreased granulation tissue necrosis advanced age fragile skin infection daily smoker    Orders placed.    Thank you for the consult.     We will continue to follow. See additional note under Notes Tab for tentative f/u plan/dates.    03/20/2024  "

## 2024-03-20 NOTE — PLAN OF CARE
Problem: Occupational Therapy  Goal: Occupational Therapy Goal  Description: STG: (in 1 week)  Pt will perform grooming with setup  Pt will bathe with setup and moderate assistance  Pt will perform UE dressing with moderate assistance  Pt will perform LE dressing with moderate assistance  Pt will sit EOB x 7 min with supervision assistance  Pt will transfer bed/chair/bsc with CGA  Pt will perform standing task x 3 min with CGA with RW   Pt will tolerate 15 minutes of tx without fatigue      LTG: (in 5 weeks)  1.Restore to max I with self care and mobility.   Outcome: Ongoing, Progressing     Pt is alert, but confused, has difficulty answering questions and has difficulty following commands. Pt improved after sitting up, but still had difficulty with all activities. OT will treat pt while he is hospitalized. Pt may benefit from swingbed at D/C or home health therapy if he progresses quickly. At this time, pt requires 24 hour assistance.

## 2024-03-20 NOTE — ASSESSMENT & PLAN NOTE
Chronic, controlled. Latest blood pressure and vitals reviewed-     Temp:  [97.5 °F (36.4 °C)-97.9 °F (36.6 °C)]   Pulse:  []   Resp:  [16-21]   BP: (136-163)/()   SpO2:  [74 %-97 %] .   Home meds for hypertension were reviewed and noted below.   Hypertension Medications               furosemide (LASIX) 20 MG tablet Take 1 tablet (20 mg total) by mouth every other day.    hydroCHLOROthiazide (HYDRODIURIL) 12.5 MG Tab Take 1 tablet (12.5 mg total) by mouth once daily.            While in the hospital, will manage blood pressure as follows; hold home meds due to ROSA ISELA/hyponatremia, started on amlodipine for control.     Will utilize p.r.n. blood pressure medication only if patient's blood pressure greater than 180/110 and he develops symptoms such as worsening chest pain or shortness of breath.

## 2024-03-20 NOTE — ASSESSMENT & PLAN NOTE
Likely sec to hypoxia, hyponatremia and alcohol withdrawal.   Ammonia < 10.   CIWA protocol initiated.

## 2024-03-20 NOTE — ASSESSMENT & PLAN NOTE
Had evaluation with CT chest, abdomen and pelvis in 2022 and 2023 which was unremarkable for malignancy.   BMI 12 with significant signs of muscle wasting.   Checking HIV.  Nutritionist consulted.   Poor prognosis.

## 2024-03-20 NOTE — PT/OT/SLP EVAL
Occupational Therapy   Evaluation    Name: Dereck Delgado  MRN: [id number]  Admitting Diagnosis: COPD exacerbation  Recent Surgery: * No surgery found *      Recommendations:     Discharge Recommendations: Moderate Intensity Therapy (verses low intensity)  Discharge Equipment Recommendations:  to be determined by next level of care  Barriers to discharge:       Assessment:     Dereck Delgado is a 64 y.o. male with a medical diagnosis of COPD exacerbation.  He presents with alert but confused and unable to follow commands well or answer questions. Performance deficits affecting function: weakness, impaired endurance, impaired self care skills, impaired functional mobility, gait instability, impaired balance, impaired cognition, decreased upper extremity function, decreased lower extremity function, decreased safety awareness, pain, impaired coordination, impaired skin, impaired cardiopulmonary response to activity.      Pt is alert, but confused, has difficulty answering questions and has difficulty following commands. Pt improved after sitting up, but still had difficulty with all activities. OT will treat pt while he is hospitalized. Pt may benefit from swingbed at D/C or home health therapy if he progresses quickly. At this time, pt requires 24 hour assistance.      Rehab Prognosis: Good; patient would benefit from acute skilled OT services to address these deficits and reach maximum level of function.       Plan:     Patient to be seen 5 x/week to address the above listed problems via self-care/home management, therapeutic activities, therapeutic exercises, neuromuscular re-education  Plan of Care Expires: 03/21/24  Plan of Care Reviewed with: patient    Subjective     Chief Complaint: Pt admitted with COPD exacerbation, malnutrition, hypoxia and hyponatremia, confusion  Patient/Family Comments/goals: Pt is agreeable to evaluation, c/o (B) ankle pain with standing    Occupational Profile:  Living Environment:  Pt lives in a home with 6 steps to enter per . Pt unable to state on evaluation.  Previous level of function: Pt unable to state.   Roles and Routines: Uncertain, , took care of himself when able  Equipment Used at Home: cane, straight, oxygen  Assistance upon Discharge: Pt's brother will offer assistance, per recent Doctor's note, pt's brother is looking into Hospice for assistance as well.    Pain/Comfort:  Pain Rating 1: 0/10 (at rest, but up to 6/10 with standing due to (B) ankle wounds)  Location - Side 1: Bilateral  Location 1: ankle  Pain Addressed 1: Pre-medicate for activity, Distraction, Reposition, Cessation of Activity  Pain Rating Post-Intervention 1: 0/10    Patients cultural, spiritual, Adventism conflicts given the current situation: no    Objective:     Communicated with: RENE Almaraz prior to session.  Patient found HOB elevated with peripheral IV, oxygen, bed alarm upon OT entry to room.    General Precautions: Standard, fall, respiratory  Orthopedic Precautions: N/A  Braces: N/A  Respiratory Status: Nasal cannula, flow 2 L/min    Occupational Performance:    Bed Mobility:    Patient completed Supine to Sit with stand by assistance  Patient completed Sit to Supine with stand by assistance    Functional Mobility/Transfers:  Patient completed Sit <> Stand Transfer with minimum assistance  with  hand-held assist and CGA with RW   Functional Mobility: Pt ambulated in room with RW and min(A) with small steps with decreased coordination.    Activities of Daily Living:  Upper Body Dressing: total assistance for donning a gown.    Cognitive/Visual Perceptual:  Cognitive/Psychosocial Skills:     -       Oriented to: Person   -       Follows Commands/attention:Easily distracted and able to follow simple 1 steps commands inconsistently and with cues  -       Communication: Pt having difficulty expressing himself, difficulty saying words clearly  -       Safety awareness/insight  to disability: impaired   -       Mood/Affect/Coping skills/emotional control: Cooperative    Physical Exam:  Balance:    -       sitting with SBA, Standing with CGA/ min(A) with RW  Dominant hand:    -       right  Upper Extremity Range of Motion:     -       Right Upper Extremity: AAROM was WFL, AROM on observation was WFL  -       Left Upper Extremity: AAROM was WFL, AROM on observation was WFL, unable to follow verbal commands for AROM  Upper Extremity Strength:    -       Right Upper Extremity: per observation at least a 3(+)/5, but did not follow commands for MMT  -       Left Upper Extremity: per observation, at least 3(+)/5, but did not follow commands for MMT   Strength:    -       Right Upper Extremity: WFL  -       Left Upper Extremity: WFL  Gross motor coordination:   impaired, difficulty processing commands and motor planning. Pt also appears to have a little ataxia with his movements at times    AMPAC 6 Click ADL:  AMPAC Total Score: 21    Treatment & Education:  Pt educated on OT role/POC.   Importance of OOB activity with staff assistance.  Importance of sitting up in the chair throughout the day as tolerated, especially for meals   Safety during functional t/f and mobility with use of RW  Importance of assisting with self-care activities   All questions/concerns answered within OT scope of practice      Patient left HOB elevated with all lines intact, call button in reach, and RN notified    GOALS:   Multidisciplinary Problems       Occupational Therapy Goals          Problem: Occupational Therapy    Goal Priority Disciplines Outcome Interventions   Occupational Therapy Goal     OT, PT/OT Ongoing, Progressing    Description: STG: (in 1 week)  Pt will perform grooming with setup  Pt will bathe with setup and moderate assistance  Pt will perform UE dressing with moderate assistance  Pt will perform LE dressing with moderate assistance  Pt will sit EOB x 7 min with supervision assistance  Pt will  transfer bed/chair/bsc with CGA  Pt will perform standing task x 3 min with CGA with RW   Pt will tolerate 15 minutes of tx without fatigue      LTG: (in 5 weeks)  1.Restore to max I with self care and mobility.                        History:     Past Medical History:   Diagnosis Date    COPD (chronic obstructive pulmonary disease)     CVA (cerebral vascular accident) 2018    Depression     Essential hypertension 07/22/2022    Nicotine dependence     PVD (peripheral vascular disease) 10/20/2022         Past Surgical History:   Procedure Laterality Date    EYE SURGERY         Time Tracking:     OT Date of Treatment: 03/20/24  OT Start Time: 1123  OT Stop Time: 1136  OT Total Time (min): 13 min    Billable Minutes:Evaluation moderate complexity    3/20/2024

## 2024-03-20 NOTE — PLAN OF CARE
Problem: Adult Inpatient Plan of Care  Goal: Plan of Care Review  Outcome: Ongoing, Progressing  Goal: Patient-Specific Goal (Individualized)  Outcome: Ongoing, Progressing  Goal: Absence of Hospital-Acquired Illness or Injury  Outcome: Ongoing, Progressing  Goal: Optimal Comfort and Wellbeing  Outcome: Ongoing, Progressing  Goal: Readiness for Transition of Care  Outcome: Ongoing, Progressing     Problem: Adjustment to Illness COPD (Chronic Obstructive Pulmonary Disease)  Goal: Optimal Chronic Illness Coping  Outcome: Ongoing, Progressing     Problem: Functional Ability Impaired COPD (Chronic Obstructive Pulmonary Disease)  Goal: Optimal Level of Functional [place]  Outcome: Ongoing, Progressing     Problem: Infection COPD (Chronic Obstructive Pulmonary Disease)  Goal: Absence of Infection Signs and Symptoms  Outcome: Ongoing, Progressing     Problem: Oral Intake Inadequate COPD (Chronic Obstructive Pulmonary Disease)  Goal: Improved Nutrition Intake  Outcome: Ongoing, Progressing     Problem: Respiratory Compromise COPD (Chronic Obstructive Pulmonary Disease)  Goal: Effective Oxygenation and Ventilation  Outcome: Ongoing, Progressing     Problem: Gas Exchange Impaired  Goal: Optimal Gas Exchange  Outcome: Ongoing, Progressing     Problem: Fluid and Electrolyte Imbalance (Acute Kidney Injury/Impairment)  Goal: Fluid and Electrolyte Balance  Outcome: Ongoing, Progressing     Problem: Oral Intake Inadequate (Acute Kidney Injury/Impairment)  Goal: Optimal Nutrition Intake  Outcome: Ongoing, Progressing     Problem: Renal Function Impairment (Acute Kidney Injury/Impairment)  Goal: Effective Renal Function  Outcome: Ongoing, Progressing     Problem: Impaired Wound Healing  Goal: Optimal Wound Healing  Outcome: Ongoing, Progressing     Problem: Skin Injury Risk Increased  Goal: Skin Health and Integrity  Outcome: Ongoing, Progressing

## 2024-03-20 NOTE — ASSESSMENT & PLAN NOTE
Patient has hyponatremia which is uncontrolled,We will aim to correct the sodium by 4-6mEq in 24 hours. We will monitor sodium Every 12 hours. The hyponatremia is due to likely from poor oral intake, cannot rule out SIADH at this point. We will obtain the following studies: Urine sodium, urine osmolality, serum osmolality. We will treat the hyponatremia with IV fluids as follows: continue fluids via banana bag The patient's sodium results have been reviewed and are listed below.  Recent Labs   Lab 03/20/24  0636   *

## 2024-03-20 NOTE — SUBJECTIVE & OBJECTIVE
Interval History: Patient seen and examined at the bedside, continues to remain confused with tremors.     Review of Systems   Respiratory:  Positive for shortness of breath.    Neurological:  Positive for tremors and weakness.   Psychiatric/Behavioral:  Positive for confusion.      Objective:     Vital Signs (Most Recent):  Temp: 97.5 °F (36.4 °C) (03/20/24 1009)  Pulse: 95 (03/20/24 1420)  Resp: 18 (03/20/24 1420)  BP: (!) 149/85 (03/20/24 1420)  SpO2: 95 % (03/20/24 1420) Vital Signs (24h Range):  Temp:  [97.5 °F (36.4 °C)-97.9 °F (36.6 °C)] 97.5 °F (36.4 °C)  Pulse:  [] 95  Resp:  [16-21] 18  SpO2:  [74 %-97 %] 95 %  BP: (136-163)/() 149/85     Weight: 36.6 kg (80 lb 11 oz)  Body mass index is 12.64 kg/m².    Intake/Output Summary (Last 24 hours) at 3/20/2024 1430  Last data filed at 3/20/2024 0745  Gross per 24 hour   Intake 100 ml   Output 10 ml   Net 90 ml           Physical Exam  Constitutional:       General: He is awake.      Appearance: He is cachectic. He is ill-appearing.   HENT:      Head: Normocephalic.      Mouth/Throat:      Mouth: Mucous membranes are dry.   Eyes:      Extraocular Movements: Extraocular movements intact.   Cardiovascular:      Rate and Rhythm: Normal rate and regular rhythm.   Pulmonary:      Effort: Pulmonary effort is normal. No respiratory distress.      Breath sounds: Rhonchi present.   Abdominal:      General: Abdomen is flat. There is no distension.      Tenderness: There is no abdominal tenderness.   Musculoskeletal:         General: No swelling.   Neurological:      General: No focal deficit present.      Mental Status: He is disoriented.             Significant Labs: All pertinent labs within the past 24 hours have been reviewed.    Significant Imaging: I have reviewed all pertinent imaging results/findings within the past 24 hours.

## 2024-03-21 NOTE — ASSESSMENT & PLAN NOTE
Suspect related to mental status but thrush might be contributing.   SLP consulted and ok for chopped meats.

## 2024-03-21 NOTE — ASSESSMENT & PLAN NOTE
Patient's COPD is with exacerbation noted by continued dyspnea and worsening of baseline hypoxia currently.  Patient is currently on COPD Pathway. Continue scheduled inhalers Steroids, Antibiotics, and Supplemental oxygen and monitor respiratory status closely.   Discharge to hospice.

## 2024-03-21 NOTE — PT/OT/SLP DISCHARGE
Occupational Therapy Discharge Summary    Dereck Delgado  MRN: [id number]   Principal Problem: COPD exacerbation      Patient Discharged from acute Occupational Therapy on 3/21/2024.  Please refer to prior OT note dated 03/20/2024 for functional status.    Assessment:       Pt's family have decided to take pt home for hospice care. Dr. Moncada and pt's family do not feel that pt is able to improve with therapy and feel that he is end stage with his illness.    Objective:     GOALS:   Multidisciplinary Problems       Occupational Therapy Goals          Problem: Occupational Therapy    Goal Priority Disciplines Outcome Interventions   Occupational Therapy Goal     OT, PT/OT Ongoing, Progressing    Description: STG: (in 1 week)  Pt will perform grooming with setup  Pt will bathe with setup and moderate assistance  Pt will perform UE dressing with moderate assistance  Pt will perform LE dressing with moderate assistance  Pt will sit EOB x 7 min with supervision assistance  Pt will transfer bed/chair/bsc with CGA  Pt will perform standing task x 3 min with CGA with RW   Pt will tolerate 15 minutes of tx without fatigue      LTG: (in 5 weeks)  1.Restore to max I with self care and mobility.                        Reasons for Discontinuation of Therapy Services  Transfer to alternate level of care.      Plan:     Patient Discharged to: Palliative Care/Hospice    3/21/2024

## 2024-03-21 NOTE — NURSING
Brother called to receive information on pt, but did not have the password that was setup by patient.  Brother seemed angry about me not giving him information. Brother states that he is on his way and will be here in 15min to check on pt.

## 2024-03-21 NOTE — ASSESSMENT & PLAN NOTE
S/p 2 stents in LE's per pt, following with Dr. Diaz.  Arterial dopplers with JADEN without new lesions.

## 2024-03-21 NOTE — ASSESSMENT & PLAN NOTE
Patient with acute kidney injury/acute renal failure likely due to pre-renal azotemia due to dehydration ROSA ISELA is currently worsening. Baseline creatinine  1.18 on 3/14/24  - Labs reviewed- Renal function/electrolytes with Estimated Creatinine Clearance: 23 mL/min (A) (based on SCr of 1.66 mg/dL (H)). according to latest data.   Bladder scan significant for urinary retention which is likely contributing to ROSA ISELA- s/p straight cath.

## 2024-03-21 NOTE — PT/OT/SLP DISCHARGE
Physical Therapy Discharge Summary    Name: Dereck Delgado  MRN: [id number]   Principal Problem: COPD exacerbation     Patient Discharged from acute Physical Therapy on 3/21/2024.  Please refer to prior PT noted date on 3/20/2024 for functional status.     Assessment:     Patient to discharge home with hospice care    Objective:     GOALS:   Multidisciplinary Problems       Physical Therapy Goals          Problem: Physical Therapy    Goal Priority Disciplines Outcome Goal Variances Interventions   Physical Therapy Goal     PT, PT/OT Ongoing, Progressing     Description: Short Term Goals to be met by: 4/3/2024    Patient will increase functional independence with mobility by performin. Supine to sit with independently  2. Sit to stand transfer with independently using lowest level of assistive device  3. Bed to chair transfer with independently using lowest level of assistive device  4. Gait  x 100 feet with independently using lowest level of assistive device  5. Lower extremity exercise program x30 reps per handout, with assistance as needed    Long Term Goals to be met by: 2024    Pt will regain full independent functional mobility with lowest level of assistive device to return to home situation and prior activities of daily living.                        Reasons for Discontinuation of Therapy Services  D/c home today with hospice care       Plan:     Patient Discharged to: Palliative Care/Hospice.      3/21/2024

## 2024-03-21 NOTE — ASSESSMENT & PLAN NOTE
Patient with acute kidney injury/acute renal failure likely due to pre-renal azotemia due to dehydration ROSA ISELA is currently worsening. Baseline creatinine  1.18 on 3/14/24  - Labs reviewed- Renal function/electrolytes with Estimated Creatinine Clearance: 23 mL/min (A) (based on SCr of 1.66 mg/dL (H)). according to latest data.

## 2024-03-21 NOTE — ASSESSMENT & PLAN NOTE
Chronic, controlled. Latest blood pressure and vitals reviewed-     Temp:  [96.1 °F (35.6 °C)-98 °F (36.7 °C)]   Pulse:  [86-97]   Resp:  [18-20]   BP: (107-154)/(57-85)   SpO2:  [94 %-99 %] .   Home meds for hypertension were reviewed and noted below.   Hypertension Medications               furosemide (LASIX) 20 MG tablet Take 1 tablet (20 mg total) by mouth every other day.    hydroCHLOROthiazide (HYDRODIURIL) 12.5 MG Tab Take 1 tablet (12.5 mg total) by mouth once daily.        Hold meds.     Will utilize p.r.n. blood pressure medication only if patient's blood pressure greater than 180/110 and he develops symptoms such as worsening chest pain or shortness of breath.

## 2024-03-21 NOTE — PLAN OF CARE
Problem: Adult Inpatient Plan of Care  Goal: Plan of Care Review  Outcome: Ongoing, Progressing  Goal: Patient-Specific Goal (Individualized)  Outcome: Ongoing, Progressing  Goal: Absence of Hospital-Acquired Illness or Injury  Outcome: Ongoing, Progressing  Goal: Optimal Comfort and Wellbeing  Outcome: Ongoing, Progressing  Goal: Readiness for Transition of Care  Outcome: Ongoing, Progressing     Problem: Adjustment to Illness COPD (Chronic Obstructive Pulmonary Disease)  Goal: Optimal Chronic Illness Coping  Outcome: Ongoing, Progressing     Problem: Functional Ability Impaired COPD (Chronic Obstructive Pulmonary Disease)  Goal: Optimal Level of Functional [place]  Outcome: Ongoing, Progressing

## 2024-03-21 NOTE — DISCHARGE SUMMARY
[facility] Hospital Medicine  Discharge Summary      Patient Name: Dereck Delgado  MRN: [id number]  KATHY: [id number]  Patient Class: IP- Inpatient  Admission Date: 3/18/2024  Hospital Length of Stay: 1 days  Discharge Date and Time:  03/21/2024 1:19 PM  Attending Physician: Andry Moncada MD   Discharging Provider: ANDRY MONCADA MD  Primary Care Provider: Jose Manuel Castro MD    Primary Care Team: Networked reference to record PCT     HPI:   Patient is a 63yo male with a PMH of COPD on 2L NC O2 at home, CVA, HTN, PVD s/p 2 stents placement, depression who presents to [facility] ED via EMS with SOB, cough, confusion, weakness. Patient was sent to the ED from Dr. Skaggs's office 4 days ago on 3/14/24 for his symptoms along with low spO2. He was seen in the ED and was sent home to see if OP therapy would work. Today he returned with increased confusion, weakness, and SOB. Patient's brother reports decreased PO intake since ED visit and decreased mobility since. Denies difficulty swallowing or history of choking on food. He hasn't been out of the house due to the decreased activity, SOB, CURTIS. Endorses confusion (patient was cleaning carpet while hands were in the air per brother). Endorses chronic weight loss in the past 3 years (used to weigh 110 lb but now 80 lbs) since losing his wife. Endorses chronic night sweats all his life. Patient sees Dr. Castro as his PCP and Dr. Kim as his pulmonologist.     Upon presentation, VS remarkable for 159/96, 22. Labs remarkable for WBC 15.53, Na 127, Cl 89, K 3.3, Bun/Cr 60/1.5, GFR 49, glucose 70. BNP 1761, troponins 69.6, 64.1. EKG SR 86. CXR official read pending. Patient is admitted to hospital medicine for further management and care.    * No surgery found *      Hospital Course:   3/19- Continue steroids, antibiotics, supplemental O2. Brother at bedside and plan of care discussed in detail.   3/20- Discussed code status with  brother and wants him to be DNR. Brother wants to consider hospice- consulted.   3/21- Hospice evaluation completed. Family wants to take the patient home with hospice.      Goals of Care Treatment Preferences:  Code Status: DNR      Consults:   Consults (From admission, onward)          Status Ordering Provider     Inpatient consult to Social Work  Once        Provider:  (Not yet assigned)    Completed JULES LARA     Inpatient consult to Respiratory Care  Once        Provider:  (Not yet assigned)    Acknowledged BENJAMIN CHURCH     Inpatient consult to Registered Dietitian/Nutritionist  Once        Provider:  (Not yet assigned)    Completed RANJIT, MIN S            Renal/  ROSA ISELA (acute kidney injury)  Patient with acute kidney injury/acute renal failure likely due to pre-renal azotemia due to dehydration ROSA ISELA is currently worsening. Baseline creatinine  1.18 on 3/14/24  - Labs reviewed- Renal function/electrolytes with Estimated Creatinine Clearance: 23 mL/min (A) (based on SCr of 1.66 mg/dL (H)). according to latest data.   Bladder scan significant for urinary retention which is likely contributing to ROSA ISELA- s/p straight cath.       Final Active Diagnoses:    Diagnosis Date Noted POA    PRINCIPAL PROBLEM:  COPD exacerbation [J44.1] 06/28/2021 Yes    ROSA ISELA (acute kidney injury) [N17.9] 03/19/2024 Yes    Hyponatremia [E87.1] 03/19/2024 Yes    Type 2 myocardial infarction [I21.A1] 03/19/2024 Yes    Acute metabolic encephalopathy [G93.41] 03/19/2024 Yes    Oral thrush [B37.0] 03/20/2024 Yes    Goals of care, counseling/discussion [Z71.89] 03/20/2024 Not Applicable    Alcohol withdrawal syndrome with perceptual disturbance [F10.932] 03/20/2024 Yes    Oropharyngeal dysphagia [R13.12] 03/20/2024 Yes    Multiple wounds [T07.XXXA] 03/20/2024 Yes    Severe protein-calorie malnutrition [E43] 03/19/2024 Yes    History of CVA (cerebrovascular accident) [Z86.73] 03/19/2024 Not Applicable    PVD (peripheral vascular disease) [I73.9]  10/20/2022 Yes    Essential hypertension [I10] 07/22/2022 Yes    Depression [F32.A] 06/28/2021 Yes      Problems Resolved During this Admission:       Discharged Condition: fair    Disposition: Home-Health Care [facility] [place]    Follow Up:    Patient Instructions:   No discharge procedures on file.    Significant Diagnostic Studies: N/A    Pending Diagnostic Studies:       Procedure Component Value Units Date/Time    EXTRA TUBES [[id number]] Collected: 03/20/24 0632    Order Status: Sent Lab Status: In process Updated: 03/20/24 0656    Specimen: Blood, Venous     Narrative:      The following orders were created for panel order EXTRA TUBES.  Procedure                               Abnormality         Status                     ---------                               -----------         ------                     Red Top Hold[[id number]]                                    In process                 Light Green Top Hold[[id number]]                            In process                   Please view results for these tests on the individual orders.    EXTRA TUBES [[id number]] Collected: 03/19/24 0550    Order Status: Sent Lab Status: In process Updated: 03/19/24 0624    Specimen: Blood, Venous     Narrative:      The following orders were created for panel order EXTRA TUBES.  Procedure                               Abnormality         Status                     ---------                               -----------         ------                     Light Green Top Hold[[id number]]                            In process                 Lavender Top Hold[[id number]]                               In process                   Please view results for these tests on the individual orders.    EXTRA TUBES [[id number]] Collected: 03/18/24 2331    Order Status: Sent Lab Status: In process Updated: 03/18/24 2340    Specimen: Blood, Venous     Narrative:      The following orders were created for panel order EXTRA TUBES.  Procedure                                Abnormality         Status                     ---------                               -----------         ------                     Light Blue Top Hold[[id number]]                             In process                   Please view results for these tests on the individual orders.    Echo [[id number]]     Order Status: Sent Lab Status: No result     [facility] GENERIC ORDERABLE UOSMS - Overview: Osmolality, Serum [[id number]] Collected: 03/20/24 0538    Order Status: Sent Lab Status: In process Updated: 03/20/24 0919    Specimen: Blood     [facility] GENERIC ORDERABLE UOSMU - Overview: Osmolality, Random, Urine [[id number]] Collected: 03/19/24 1428    Order Status: Sent Lab Status: In process Updated: 03/20/24 0942    Specimen: Urine, Clean Catch            Medications:  Reconciled Home Medications:      Medication List        START taking these medications      nystatin 100,000 unit/mL suspension  Commonly known as: MYCOSTATIN  Take 4 mLs (400,000 Units total) by mouth 4 (four) times daily. for 7 days            CONTINUE taking these medications      albuterol 90 mcg/actuation inhaler  Commonly known as: VENTOLIN HFA  Inhale 2 puffs into the lungs every 6 (six) hours as needed for Wheezing. Rescue            STOP taking these medications      amitriptyline 25 MG tablet  Commonly known as: ELAVIL     aspirin 81 MG EC tablet  Commonly known as: ECOTRIN     budesonide-glycopyr-formoterol 160-9-4.8 mcg/actuation Hfaa     buPROPion 150 MG TBSR 12 hr tablet  Commonly known as: WELLBUTRIN SR     clindamycin 300 MG capsule  Commonly known as: CLEOCIN     clopidogreL 75 mg tablet  Commonly known as: PLAVIX     DULoxetine 60 MG capsule  Commonly known as: CYMBALTA     furosemide 20 MG tablet  Commonly known as: LASIX     hydroCHLOROthiazide 12.5 MG Tab  Commonly known as: HYDRODIURIL     methylPREDNISolone 4 mg tablet  Commonly known as: MEDROL DOSEPACK              Indwelling Lines/Drains at time of discharge:    Lines/Drains/Airways       None                   Time spent on the discharge of patient: 40 minutes         JULES LARA MD  Department of Hospital Medicine  [facility] - [address] [place] [address]

## 2024-03-21 NOTE — NURSING
Telesitter #4 placed in room. Charge RN called telesitter services, but there is a wait list to be monitored. Pt is on wait list. Safety measures in place. See CIWA score. PRN to be administered.

## 2024-03-21 NOTE — ASSESSMENT & PLAN NOTE
Troponin elevated but trending down.  Patient denies chest pain.  EKG without acute ischemic changes.  Echo    Result Date: 3/20/2024    Left Ventricle: Mild global hypokinesis present. There is low normal   systolic function with a visually estimated ejection fraction of 50 - 55%.   Ejection fraction by visual approximation is 50%. There is normal   diastolic function.    Right Ventricle: Normal right ventricular cavity size.    Aortic Valve: The aortic valve is a trileaflet valve. There is moderate   aortic valve sclerosis. Mildly calcified cusps.    Mitral Valve: There is mild bileaflet sclerosis.    IVC/SVC: Normal venous pressure at 3 mmHg.    Pericardium: There is a trivial effusion.        No ischemic workup required for now.

## 2024-03-21 NOTE — ASSESSMENT & PLAN NOTE
Had evaluation with CT chest, abdomen and pelvis in 2022 and 2023 which was unremarkable for malignancy.   BMI 12 with significant signs of muscle wasting.   HIV negative.   Nutritionist consulted.   Poor prognosis.

## 2024-03-21 NOTE — ASSESSMENT & PLAN NOTE
Received Ativan per [facility] protocol.   Thiamine (high dose), MV and folate received.

## 2024-03-21 NOTE — NURSING
"3/20/24 @ 2300 - Brother, Sj, arrived to check on pt. Sj setup new password since he is the caregiver and pt cannot answer for himself. Password setup to "[email or web address]". Brother visited with pt for a while and exited department.  "

## 2024-03-21 NOTE — ASSESSMENT & PLAN NOTE
Patient has hyponatremia which is uncontrolled,We will aim to correct the sodium by 4-6mEq in 24 hours. We will monitor sodium Every 12 hours. The hyponatremia is due to likely from poor oral intake, cannot rule out SIADH at this point. We will obtain the following studies: Urine sodium, urine osmolality, serum osmolality. We will treat the hyponatremia with IV fluids as follows: continue fluids via banana bag The patient's sodium results have been reviewed and are listed below.  Recent Labs   Lab 03/21/24  0524   *

## 2024-03-21 NOTE — ASSESSMENT & PLAN NOTE
Advance Care Planning     Code Status  In light of the patients advanced and life limiting illness,I engaged the the family in a voluntary conversation about the patient's preferences for care  at the very end of life. The patient wishes to have a natural, peaceful death.  Along those lines, the patient does not wish to have CPR or other invasive treatments performed when his heart and/or breathing stops. I communicated to the patient that a DNR order would be placed in his medical record to reflect this preference.  I spent a total of 30 minutes engaging the patient in this advance care planning discussion.        Family is interested in hospice- consulted.   Agreed upon discharge to home with hospice for end of life.

## 2024-03-22 NOTE — PLAN OF CARE
[facility] - [facility] Telemetry  Discharge Final Note    Primary Care Provider: Jose Manuel Castro MD    Expected Discharge Date: 3/21/2024    Final Discharge Note (most recent)       Final Note - 03/22/24 0828          Final Note    Assessment Type Final Discharge Note     Anticipated Discharge Disposition Hospice/Home        Post-Acute Status    Post-Acute Authorization Hospice     Hospice Status Set-up Complete/Auth obtained     Discharge Delays None known at this time                     Important Message from Medicare  Important Message from Medicare regarding Discharge Appeal Rights: Given to patient/caregiver, Explained to patient/caregiver, Signed/date by patient/caregiver     Date IMM was signed: 03/21/24  Time IMM was signed: 0828    Pt discharged home with [facility] hosp[facility].

## 2024-04-04 DIAGNOSIS — F32.A DEPRESSION, UNSPECIFIED DEPRESSION TYPE: ICD-10-CM

## 2024-04-04 RX ORDER — DULOXETIN HYDROCHLORIDE 60 MG/1
60 CAPSULE, DELAYED RELEASE ORAL
Qty: 90 CAPSULE | Refills: 1 | OUTPATIENT
Start: 2024-04-04

## (undated) DEVICE — GOWN NONREINF SET-IN SLV 2XL

## (undated) DEVICE — GLOVE PROTEXIS PI SYN SURG 6.5

## (undated) DEVICE — SYR MARK 7 ARTERION 150ML

## (undated) DEVICE — SHEATH BRITE TIP INTRO 11CM 5F

## (undated) DEVICE — INTRODUCER SHEATH 7F 11CM 35MM

## (undated) DEVICE — DEVICE INFLATION BASIX 25

## (undated) DEVICE — GLOVE PROTEXIS PI SYN SURG 7.5

## (undated) DEVICE — SOL NACL IRR 1000ML BTL

## (undated) DEVICE — GUIDEWIRE AQUATRACK REG 260CM

## (undated) DEVICE — Device

## (undated) DEVICE — COVER PROBE WITH BAND 6X96IN

## (undated) DEVICE — GLIDEWIRE AQUATRACK 260CM STIFF STRAIGHT

## (undated) DEVICE — APPLICATOR CHLORAPREP ORN 26ML

## (undated) DEVICE — COVER SNAP KAP 26IN

## (undated) DEVICE — TUBING HPCIL ROT M/F ADPT 48IN

## (undated) DEVICE — TAPE GLOW'N TELL 55CM STL BX/20

## (undated) DEVICE — CATH SUP TORQ PGTL 5FR 65CM

## (undated) DEVICE — PROBE DOPPLER DISP STRAIGHT 8MHZ